# Patient Record
Sex: FEMALE | Race: WHITE | Employment: OTHER | URBAN - METROPOLITAN AREA
[De-identification: names, ages, dates, MRNs, and addresses within clinical notes are randomized per-mention and may not be internally consistent; named-entity substitution may affect disease eponyms.]

---

## 2017-02-18 ENCOUNTER — HOSPITAL ENCOUNTER (INPATIENT)
Age: 82
LOS: 4 days | Discharge: NURSING, REHAB OR LONG-TERM CARE | DRG: 480 | End: 2017-02-22
Attending: EMERGENCY MEDICINE | Admitting: INTERNAL MEDICINE
Payer: MEDICARE

## 2017-02-18 ENCOUNTER — ANESTHESIA EVENT (OUTPATIENT)
Dept: SURGERY | Age: 82
DRG: 480 | End: 2017-02-18
Payer: MEDICARE

## 2017-02-18 ENCOUNTER — APPOINTMENT (OUTPATIENT)
Dept: GENERAL RADIOLOGY | Age: 82
DRG: 480 | End: 2017-02-18
Attending: EMERGENCY MEDICINE
Payer: MEDICARE

## 2017-02-18 DIAGNOSIS — S72.001A CLOSED RIGHT HIP FRACTURE, INITIAL ENCOUNTER (HCC): Primary | ICD-10-CM

## 2017-02-18 DIAGNOSIS — F41.1 GAD (GENERALIZED ANXIETY DISORDER): ICD-10-CM

## 2017-02-18 PROBLEM — S72.009A HIP FRACTURE (HCC): Status: ACTIVE | Noted: 2017-02-18

## 2017-02-18 LAB
ALBUMIN SERPL BCP-MCNC: 3.4 G/DL (ref 3.2–4.6)
ALBUMIN/GLOB SERPL: 1.1 {RATIO} (ref 1.2–3.5)
ALP SERPL-CCNC: 83 U/L (ref 50–136)
ALT SERPL-CCNC: 16 U/L (ref 12–65)
ANION GAP BLD CALC-SCNC: 6 MMOL/L (ref 7–16)
APPEARANCE UR: ABNORMAL
AST SERPL W P-5'-P-CCNC: 23 U/L (ref 15–37)
BACTERIA URNS QL MICRO: ABNORMAL /HPF
BASOPHILS # BLD AUTO: 0 K/UL (ref 0–0.2)
BASOPHILS # BLD: 0 % (ref 0–2)
BILIRUB SERPL-MCNC: 0.7 MG/DL (ref 0.2–1.1)
BILIRUB UR QL: NEGATIVE
BUN SERPL-MCNC: 22 MG/DL (ref 8–23)
CALCIUM SERPL-MCNC: 9.5 MG/DL (ref 8.3–10.4)
CHLORIDE SERPL-SCNC: 109 MMOL/L (ref 98–107)
CO2 SERPL-SCNC: 28 MMOL/L (ref 21–32)
COLOR UR: YELLOW
CREAT SERPL-MCNC: 1.21 MG/DL (ref 0.6–1)
DIFFERENTIAL METHOD BLD: ABNORMAL
EOSINOPHIL # BLD: 0.1 K/UL (ref 0–0.8)
EOSINOPHIL NFR BLD: 1 % (ref 0.5–7.8)
ERYTHROCYTE [DISTWIDTH] IN BLOOD BY AUTOMATED COUNT: 13.8 % (ref 11.9–14.6)
GLOBULIN SER CALC-MCNC: 3.1 G/DL (ref 2.3–3.5)
GLUCOSE SERPL-MCNC: 102 MG/DL (ref 65–100)
GLUCOSE UR STRIP.AUTO-MCNC: NEGATIVE MG/DL
HCT VFR BLD AUTO: 40.8 % (ref 35.8–46.3)
HGB BLD-MCNC: 13.6 G/DL (ref 11.7–15.4)
HGB UR QL STRIP: NEGATIVE
IMM GRANULOCYTES # BLD: 0 K/UL (ref 0–0.5)
IMM GRANULOCYTES NFR BLD AUTO: 0.2 % (ref 0–5)
KETONES UR QL STRIP.AUTO: NEGATIVE MG/DL
LEUKOCYTE ESTERASE UR QL STRIP.AUTO: ABNORMAL
LYMPHOCYTES # BLD AUTO: 30 % (ref 13–44)
LYMPHOCYTES # BLD: 1.8 K/UL (ref 0.5–4.6)
MCH RBC QN AUTO: 32 PG (ref 26.1–32.9)
MCHC RBC AUTO-ENTMCNC: 33.3 G/DL (ref 31.4–35)
MCV RBC AUTO: 96 FL (ref 79.6–97.8)
MONOCYTES # BLD: 0.5 K/UL (ref 0.1–1.3)
MONOCYTES NFR BLD AUTO: 8 % (ref 4–12)
NEUTS SEG # BLD: 3.8 K/UL (ref 1.7–8.2)
NEUTS SEG NFR BLD AUTO: 61 % (ref 43–78)
NITRITE UR QL STRIP.AUTO: NEGATIVE
PH UR STRIP: 6 [PH] (ref 5–9)
PLATELET # BLD AUTO: 169 K/UL (ref 150–450)
PMV BLD AUTO: 10.5 FL (ref 10.8–14.1)
POTASSIUM SERPL-SCNC: 4.8 MMOL/L (ref 3.5–5.1)
PROT SERPL-MCNC: 6.5 G/DL (ref 6.3–8.2)
PROT UR STRIP-MCNC: NEGATIVE MG/DL
RBC # BLD AUTO: 4.25 M/UL (ref 4.05–5.25)
RBC #/AREA URNS HPF: ABNORMAL /HPF
SODIUM SERPL-SCNC: 143 MMOL/L (ref 136–145)
SP GR UR REFRACTOMETRY: 1.01 (ref 1–1.02)
UROBILINOGEN UR QL STRIP.AUTO: 0.2 EU/DL (ref 0.2–1)
WBC # BLD AUTO: 6.2 K/UL (ref 4.3–11.1)
WBC URNS QL MICRO: ABNORMAL /HPF

## 2017-02-18 PROCEDURE — 51702 INSERT TEMP BLADDER CATH: CPT | Performed by: EMERGENCY MEDICINE

## 2017-02-18 PROCEDURE — 86580 TB INTRADERMAL TEST: CPT | Performed by: INTERNAL MEDICINE

## 2017-02-18 PROCEDURE — 96374 THER/PROPH/DIAG INJ IV PUSH: CPT | Performed by: EMERGENCY MEDICINE

## 2017-02-18 PROCEDURE — 74011250636 HC RX REV CODE- 250/636: Performed by: INTERNAL MEDICINE

## 2017-02-18 PROCEDURE — 85025 COMPLETE CBC W/AUTO DIFF WBC: CPT | Performed by: EMERGENCY MEDICINE

## 2017-02-18 PROCEDURE — 87641 MR-STAPH DNA AMP PROBE: CPT | Performed by: INTERNAL MEDICINE

## 2017-02-18 PROCEDURE — 81003 URINALYSIS AUTO W/O SCOPE: CPT | Performed by: EMERGENCY MEDICINE

## 2017-02-18 PROCEDURE — 71010 XR CHEST SNGL V: CPT

## 2017-02-18 PROCEDURE — 65270000029 HC RM PRIVATE

## 2017-02-18 PROCEDURE — 80053 COMPREHEN METABOLIC PANEL: CPT | Performed by: EMERGENCY MEDICINE

## 2017-02-18 PROCEDURE — 73552 X-RAY EXAM OF FEMUR 2/>: CPT

## 2017-02-18 PROCEDURE — 77030005515 HC CATH URETH FOL14 BARD -B

## 2017-02-18 PROCEDURE — 86923 COMPATIBILITY TEST ELECTRIC: CPT | Performed by: EMERGENCY MEDICINE

## 2017-02-18 PROCEDURE — 74011250636 HC RX REV CODE- 250/636: Performed by: NURSE PRACTITIONER

## 2017-02-18 PROCEDURE — 96375 TX/PRO/DX INJ NEW DRUG ADDON: CPT | Performed by: EMERGENCY MEDICINE

## 2017-02-18 PROCEDURE — 99285 EMERGENCY DEPT VISIT HI MDM: CPT | Performed by: EMERGENCY MEDICINE

## 2017-02-18 PROCEDURE — 74011250637 HC RX REV CODE- 250/637: Performed by: INTERNAL MEDICINE

## 2017-02-18 PROCEDURE — 74011250636 HC RX REV CODE- 250/636: Performed by: EMERGENCY MEDICINE

## 2017-02-18 PROCEDURE — 77030021907 HC KT URIN FOL O&M -A

## 2017-02-18 PROCEDURE — 77030027138 HC INCENT SPIROMETER -A

## 2017-02-18 PROCEDURE — 93005 ELECTROCARDIOGRAM TRACING: CPT | Performed by: EMERGENCY MEDICINE

## 2017-02-18 PROCEDURE — 77030032490 HC SLV COMPR SCD KNE COVD -B

## 2017-02-18 PROCEDURE — 73502 X-RAY EXAM HIP UNI 2-3 VIEWS: CPT

## 2017-02-18 PROCEDURE — 74011000302 HC RX REV CODE- 302: Performed by: INTERNAL MEDICINE

## 2017-02-18 PROCEDURE — 77030036696 HC BOOT TRACT BUCKS S2SG -A

## 2017-02-18 PROCEDURE — 86900 BLOOD TYPING SEROLOGIC ABO: CPT | Performed by: EMERGENCY MEDICINE

## 2017-02-18 RX ORDER — TRAMADOL HYDROCHLORIDE 50 MG/1
50 TABLET ORAL
Status: DISCONTINUED | OUTPATIENT
Start: 2017-02-18 | End: 2017-02-22 | Stop reason: HOSPADM

## 2017-02-18 RX ORDER — GLUCOSAMINE SULFATE 1500 MG
1000 POWDER IN PACKET (EA) ORAL DAILY
COMMUNITY
End: 2017-02-22

## 2017-02-18 RX ORDER — ALBUTEROL SULFATE 90 UG/1
1 AEROSOL, METERED RESPIRATORY (INHALATION)
Status: DISCONTINUED | OUTPATIENT
Start: 2017-02-18 | End: 2017-02-22 | Stop reason: HOSPADM

## 2017-02-18 RX ORDER — ACETAMINOPHEN 325 MG/1
650 TABLET ORAL EVERY 8 HOURS
Status: DISCONTINUED | OUTPATIENT
Start: 2017-02-18 | End: 2017-02-19 | Stop reason: HOSPADM

## 2017-02-18 RX ORDER — ONDANSETRON 2 MG/ML
4 INJECTION INTRAMUSCULAR; INTRAVENOUS
Status: DISCONTINUED | OUTPATIENT
Start: 2017-02-18 | End: 2017-02-19 | Stop reason: HOSPADM

## 2017-02-18 RX ORDER — SODIUM CHLORIDE 9 MG/ML
75 INJECTION, SOLUTION INTRAVENOUS CONTINUOUS
Status: DISCONTINUED | OUTPATIENT
Start: 2017-02-18 | End: 2017-02-19 | Stop reason: HOSPADM

## 2017-02-18 RX ORDER — CARVEDILOL 3.12 MG/1
3.12 TABLET ORAL 2 TIMES DAILY WITH MEALS
Status: DISCONTINUED | OUTPATIENT
Start: 2017-02-19 | End: 2017-02-19

## 2017-02-18 RX ORDER — SODIUM CHLORIDE 0.9 % (FLUSH) 0.9 %
5-10 SYRINGE (ML) INJECTION AS NEEDED
Status: DISCONTINUED | OUTPATIENT
Start: 2017-02-18 | End: 2017-02-19 | Stop reason: HOSPADM

## 2017-02-18 RX ORDER — CEFAZOLIN SODIUM IN 0.9 % NACL 2 G/50 ML
2 INTRAVENOUS SOLUTION, PIGGYBACK (ML) INTRAVENOUS
Status: DISCONTINUED | OUTPATIENT
Start: 2017-02-18 | End: 2017-02-19

## 2017-02-18 RX ORDER — MORPHINE SULFATE 2 MG/ML
2 INJECTION, SOLUTION INTRAMUSCULAR; INTRAVENOUS
Status: COMPLETED | OUTPATIENT
Start: 2017-02-18 | End: 2017-02-18

## 2017-02-18 RX ORDER — OXYCODONE HYDROCHLORIDE 5 MG/1
5 TABLET ORAL
Status: DISCONTINUED | OUTPATIENT
Start: 2017-02-18 | End: 2017-02-22 | Stop reason: HOSPADM

## 2017-02-18 RX ORDER — SODIUM CHLORIDE, SODIUM LACTATE, POTASSIUM CHLORIDE, CALCIUM CHLORIDE 600; 310; 30; 20 MG/100ML; MG/100ML; MG/100ML; MG/100ML
75 INJECTION, SOLUTION INTRAVENOUS
Status: DISCONTINUED | OUTPATIENT
Start: 2017-02-18 | End: 2017-02-20

## 2017-02-18 RX ORDER — SODIUM CHLORIDE 0.9 % (FLUSH) 0.9 %
5-10 SYRINGE (ML) INJECTION EVERY 8 HOURS
Status: DISCONTINUED | OUTPATIENT
Start: 2017-02-18 | End: 2017-02-19 | Stop reason: HOSPADM

## 2017-02-18 RX ORDER — AMIODARONE HYDROCHLORIDE 200 MG/1
100 TABLET ORAL DAILY
Status: DISCONTINUED | OUTPATIENT
Start: 2017-02-19 | End: 2017-02-22 | Stop reason: HOSPADM

## 2017-02-18 RX ORDER — ATORVASTATIN CALCIUM 10 MG/1
10 TABLET, FILM COATED ORAL DAILY
Status: DISCONTINUED | OUTPATIENT
Start: 2017-02-19 | End: 2017-02-22 | Stop reason: HOSPADM

## 2017-02-18 RX ORDER — ONDANSETRON 2 MG/ML
4 INJECTION INTRAMUSCULAR; INTRAVENOUS
Status: COMPLETED | OUTPATIENT
Start: 2017-02-18 | End: 2017-02-18

## 2017-02-18 RX ORDER — HYDROMORPHONE HYDROCHLORIDE 1 MG/ML
0.5 INJECTION, SOLUTION INTRAMUSCULAR; INTRAVENOUS; SUBCUTANEOUS
Status: DISCONTINUED | OUTPATIENT
Start: 2017-02-18 | End: 2017-02-19

## 2017-02-18 RX ADMIN — TUBERCULIN PURIFIED PROTEIN DERIVATIVE 5 UNITS: 5 INJECTION INTRADERMAL at 23:39

## 2017-02-18 RX ADMIN — ACETAMINOPHEN 650 MG: 325 TABLET, FILM COATED ORAL at 23:39

## 2017-02-18 RX ADMIN — Medication 5 ML: at 23:13

## 2017-02-18 RX ADMIN — Medication 2 MG: at 20:33

## 2017-02-18 RX ADMIN — ONDANSETRON 4 MG: 2 INJECTION INTRAMUSCULAR; INTRAVENOUS at 20:33

## 2017-02-18 RX ADMIN — HYDROMORPHONE HYDROCHLORIDE 0.5 MG: 1 INJECTION, SOLUTION INTRAMUSCULAR; INTRAVENOUS; SUBCUTANEOUS at 23:24

## 2017-02-18 RX ADMIN — SODIUM CHLORIDE 75 ML/HR: 900 INJECTION, SOLUTION INTRAVENOUS at 23:09

## 2017-02-19 ENCOUNTER — ANESTHESIA (OUTPATIENT)
Dept: SURGERY | Age: 82
DRG: 480 | End: 2017-02-19
Payer: MEDICARE

## 2017-02-19 ENCOUNTER — SURGERY (OUTPATIENT)
Age: 82
End: 2017-02-19

## 2017-02-19 ENCOUNTER — APPOINTMENT (OUTPATIENT)
Dept: GENERAL RADIOLOGY | Age: 82
DRG: 480 | End: 2017-02-19
Attending: ORTHOPAEDIC SURGERY
Payer: MEDICARE

## 2017-02-19 PROBLEM — N30.00 ACUTE CYSTITIS WITHOUT HEMATURIA: Status: ACTIVE | Noted: 2017-02-19

## 2017-02-19 PROBLEM — S72.141A CLOSED INTERTROCHANTERIC FRACTURE OF RIGHT FEMUR (HCC): Status: ACTIVE | Noted: 2017-02-18

## 2017-02-19 LAB
APTT PPP: 22.9 SEC (ref 23.5–31.7)
ATRIAL RATE: 61 BPM
BACTERIA SPEC CULT: NORMAL
CALCIUM SERPL-MCNC: 9.6 MG/DL (ref 8.3–10.4)
CALCULATED P AXIS, ECG09: 87 DEGREES
CALCULATED R AXIS, ECG10: -44 DEGREES
CALCULATED T AXIS, ECG11: 82 DEGREES
DIAGNOSIS, 93000: NORMAL
DIASTOLIC BP, ECG02: NORMAL MMHG
INR PPP: 1 (ref 0.9–1.2)
P-R INTERVAL, ECG05: 168 MS
PREALB SERPL-MCNC: 18.8 MG/DL (ref 18–35.7)
PROTHROMBIN TIME: 11.1 SEC (ref 9.6–12)
PTH-INTACT SERPL-MCNC: 213.7 PG/ML (ref 14–72)
Q-T INTERVAL, ECG07: 416 MS
QRS DURATION, ECG06: 86 MS
QTC CALCULATION (BEZET), ECG08: 418 MS
SERVICE CMNT-IMP: NORMAL
SYSTOLIC BP, ECG01: NORMAL MMHG
VENTRICULAR RATE, ECG03: 61 BPM

## 2017-02-19 PROCEDURE — 82306 VITAMIN D 25 HYDROXY: CPT | Performed by: INTERNAL MEDICINE

## 2017-02-19 PROCEDURE — 76210000016 HC OR PH I REC 1 TO 1.5 HR: Performed by: ORTHOPAEDIC SURGERY

## 2017-02-19 PROCEDURE — 74011250637 HC RX REV CODE- 250/637: Performed by: ANESTHESIOLOGY

## 2017-02-19 PROCEDURE — 84134 ASSAY OF PREALBUMIN: CPT | Performed by: INTERNAL MEDICINE

## 2017-02-19 PROCEDURE — 74011000250 HC RX REV CODE- 250

## 2017-02-19 PROCEDURE — 76010000160 HC OR TIME 0.5 TO 1 HR INTENSV-TIER 1: Performed by: ORTHOPAEDIC SURGERY

## 2017-02-19 PROCEDURE — 74011250637 HC RX REV CODE- 250/637: Performed by: INTERNAL MEDICINE

## 2017-02-19 PROCEDURE — C1713 ANCHOR/SCREW BN/BN,TIS/BN: HCPCS | Performed by: ORTHOPAEDIC SURGERY

## 2017-02-19 PROCEDURE — 0QS634Z REPOSITION RIGHT UPPER FEMUR WITH INTERNAL FIXATION DEVICE, PERCUTANEOUS APPROACH: ICD-10-PCS | Performed by: ORTHOPAEDIC SURGERY

## 2017-02-19 PROCEDURE — 74011250637 HC RX REV CODE- 250/637: Performed by: NURSE PRACTITIONER

## 2017-02-19 PROCEDURE — 77030018836 HC SOL IRR NACL ICUM -A: Performed by: ORTHOPAEDIC SURGERY

## 2017-02-19 PROCEDURE — 74011250636 HC RX REV CODE- 250/636: Performed by: NURSE PRACTITIONER

## 2017-02-19 PROCEDURE — 74011250636 HC RX REV CODE- 250/636

## 2017-02-19 PROCEDURE — 74011250636 HC RX REV CODE- 250/636: Performed by: ANESTHESIOLOGY

## 2017-02-19 PROCEDURE — 77030008467 HC STPLR SKN COVD -B: Performed by: ORTHOPAEDIC SURGERY

## 2017-02-19 PROCEDURE — C1769 GUIDE WIRE: HCPCS | Performed by: ORTHOPAEDIC SURGERY

## 2017-02-19 PROCEDURE — 77030020143 HC AIRWY LARYN INTUB CGAS -A: Performed by: ANESTHESIOLOGY

## 2017-02-19 PROCEDURE — 74011000258 HC RX REV CODE- 258: Performed by: INTERNAL MEDICINE

## 2017-02-19 PROCEDURE — 85730 THROMBOPLASTIN TIME PARTIAL: CPT | Performed by: INTERNAL MEDICINE

## 2017-02-19 PROCEDURE — 83970 ASSAY OF PARATHORMONE: CPT | Performed by: INTERNAL MEDICINE

## 2017-02-19 PROCEDURE — 76060000033 HC ANESTHESIA 1 TO 1.5 HR: Performed by: ORTHOPAEDIC SURGERY

## 2017-02-19 PROCEDURE — 77030020782 HC GWN BAIR PAWS FLX 3M -B: Performed by: ANESTHESIOLOGY

## 2017-02-19 PROCEDURE — 77030014405 HC GD ROD RMR SYNT -C: Performed by: ORTHOPAEDIC SURGERY

## 2017-02-19 PROCEDURE — 73552 X-RAY EXAM OF FEMUR 2/>: CPT

## 2017-02-19 PROCEDURE — 77030011640 HC PAD GRND REM COVD -A: Performed by: ORTHOPAEDIC SURGERY

## 2017-02-19 PROCEDURE — 74011250636 HC RX REV CODE- 250/636: Performed by: INTERNAL MEDICINE

## 2017-02-19 PROCEDURE — 65270000029 HC RM PRIVATE

## 2017-02-19 PROCEDURE — 77030035168: Performed by: ORTHOPAEDIC SURGERY

## 2017-02-19 PROCEDURE — 36415 COLL VENOUS BLD VENIPUNCTURE: CPT | Performed by: INTERNAL MEDICINE

## 2017-02-19 PROCEDURE — 77030002933 HC SUT MCRYL J&J -A: Performed by: ORTHOPAEDIC SURGERY

## 2017-02-19 PROCEDURE — 85610 PROTHROMBIN TIME: CPT | Performed by: INTERNAL MEDICINE

## 2017-02-19 DEVICE — IMPLANTABLE DEVICE: Type: IMPLANTABLE DEVICE | Site: FEMUR | Status: FUNCTIONAL

## 2017-02-19 RX ORDER — OXYCODONE HYDROCHLORIDE 5 MG/1
5 TABLET ORAL
Status: DISCONTINUED | OUTPATIENT
Start: 2017-02-19 | End: 2017-02-22 | Stop reason: HOSPADM

## 2017-02-19 RX ORDER — SODIUM CHLORIDE, SODIUM LACTATE, POTASSIUM CHLORIDE, CALCIUM CHLORIDE 600; 310; 30; 20 MG/100ML; MG/100ML; MG/100ML; MG/100ML
75 INJECTION, SOLUTION INTRAVENOUS CONTINUOUS
Status: DISCONTINUED | OUTPATIENT
Start: 2017-02-19 | End: 2017-02-20

## 2017-02-19 RX ORDER — OXYCODONE HYDROCHLORIDE 5 MG/1
10 TABLET ORAL
Status: DISCONTINUED | OUTPATIENT
Start: 2017-02-19 | End: 2017-02-19 | Stop reason: HOSPADM

## 2017-02-19 RX ORDER — SODIUM CHLORIDE 0.9 % (FLUSH) 0.9 %
5-10 SYRINGE (ML) INJECTION EVERY 8 HOURS
Status: DISCONTINUED | OUTPATIENT
Start: 2017-02-19 | End: 2017-02-22 | Stop reason: HOSPADM

## 2017-02-19 RX ORDER — SODIUM CHLORIDE 0.9 % (FLUSH) 0.9 %
5-10 SYRINGE (ML) INJECTION EVERY 8 HOURS
Status: DISCONTINUED | OUTPATIENT
Start: 2017-02-19 | End: 2017-02-19 | Stop reason: HOSPADM

## 2017-02-19 RX ORDER — DIPHENHYDRAMINE HYDROCHLORIDE 50 MG/ML
12.5 INJECTION, SOLUTION INTRAMUSCULAR; INTRAVENOUS ONCE
Status: DISCONTINUED | OUTPATIENT
Start: 2017-02-19 | End: 2017-02-19 | Stop reason: HOSPADM

## 2017-02-19 RX ORDER — SODIUM CHLORIDE 0.9 % (FLUSH) 0.9 %
5-10 SYRINGE (ML) INJECTION AS NEEDED
Status: DISCONTINUED | OUTPATIENT
Start: 2017-02-19 | End: 2017-02-19 | Stop reason: HOSPADM

## 2017-02-19 RX ORDER — CEFAZOLIN SODIUM IN 0.9 % NACL 2 G/50 ML
INTRAVENOUS SOLUTION, PIGGYBACK (ML) INTRAVENOUS AS NEEDED
Status: DISCONTINUED | OUTPATIENT
Start: 2017-02-19 | End: 2017-02-19 | Stop reason: HOSPADM

## 2017-02-19 RX ORDER — LIDOCAINE HYDROCHLORIDE 20 MG/ML
INJECTION, SOLUTION EPIDURAL; INFILTRATION; INTRACAUDAL; PERINEURAL AS NEEDED
Status: DISCONTINUED | OUTPATIENT
Start: 2017-02-19 | End: 2017-02-19 | Stop reason: HOSPADM

## 2017-02-19 RX ORDER — FAMOTIDINE 20 MG/1
20 TABLET, FILM COATED ORAL ONCE
Status: COMPLETED | OUTPATIENT
Start: 2017-02-19 | End: 2017-02-19

## 2017-02-19 RX ORDER — ENOXAPARIN SODIUM 100 MG/ML
30 INJECTION SUBCUTANEOUS EVERY 24 HOURS
Status: DISCONTINUED | OUTPATIENT
Start: 2017-02-20 | End: 2017-02-22 | Stop reason: HOSPADM

## 2017-02-19 RX ORDER — ACETAMINOPHEN 500 MG
500 TABLET ORAL ONCE
Status: DISCONTINUED | OUTPATIENT
Start: 2017-02-19 | End: 2017-02-19 | Stop reason: HOSPADM

## 2017-02-19 RX ORDER — SODIUM CHLORIDE, SODIUM LACTATE, POTASSIUM CHLORIDE, CALCIUM CHLORIDE 600; 310; 30; 20 MG/100ML; MG/100ML; MG/100ML; MG/100ML
75 INJECTION, SOLUTION INTRAVENOUS CONTINUOUS
Status: DISCONTINUED | OUTPATIENT
Start: 2017-02-19 | End: 2017-02-19 | Stop reason: HOSPADM

## 2017-02-19 RX ORDER — ACETAMINOPHEN 325 MG/1
650 TABLET ORAL EVERY 8 HOURS
Status: DISCONTINUED | OUTPATIENT
Start: 2017-02-19 | End: 2017-02-22 | Stop reason: HOSPADM

## 2017-02-19 RX ORDER — ETOMIDATE 2 MG/ML
INJECTION INTRAVENOUS AS NEEDED
Status: DISCONTINUED | OUTPATIENT
Start: 2017-02-19 | End: 2017-02-19 | Stop reason: HOSPADM

## 2017-02-19 RX ORDER — PROPOFOL 10 MG/ML
INJECTION, EMULSION INTRAVENOUS AS NEEDED
Status: DISCONTINUED | OUTPATIENT
Start: 2017-02-19 | End: 2017-02-19 | Stop reason: HOSPADM

## 2017-02-19 RX ORDER — ACETAMINOPHEN 10 MG/ML
1000 INJECTION, SOLUTION INTRAVENOUS
Status: COMPLETED | OUTPATIENT
Start: 2017-02-19 | End: 2017-02-19

## 2017-02-19 RX ORDER — HYDROMORPHONE HYDROCHLORIDE 2 MG/ML
0.5 INJECTION, SOLUTION INTRAMUSCULAR; INTRAVENOUS; SUBCUTANEOUS
Status: DISCONTINUED | OUTPATIENT
Start: 2017-02-19 | End: 2017-02-19 | Stop reason: HOSPADM

## 2017-02-19 RX ORDER — LIDOCAINE HYDROCHLORIDE 10 MG/ML
0.1 INJECTION INFILTRATION; PERINEURAL AS NEEDED
Status: DISCONTINUED | OUTPATIENT
Start: 2017-02-19 | End: 2017-02-19 | Stop reason: HOSPADM

## 2017-02-19 RX ORDER — SODIUM CHLORIDE 0.9 % (FLUSH) 0.9 %
5-10 SYRINGE (ML) INJECTION AS NEEDED
Status: DISCONTINUED | OUTPATIENT
Start: 2017-02-19 | End: 2017-02-22 | Stop reason: HOSPADM

## 2017-02-19 RX ORDER — ONDANSETRON 2 MG/ML
4 INJECTION INTRAMUSCULAR; INTRAVENOUS
Status: DISCONTINUED | OUTPATIENT
Start: 2017-02-19 | End: 2017-02-22 | Stop reason: HOSPADM

## 2017-02-19 RX ORDER — MIDAZOLAM HYDROCHLORIDE 1 MG/ML
2 INJECTION, SOLUTION INTRAMUSCULAR; INTRAVENOUS
Status: DISCONTINUED | OUTPATIENT
Start: 2017-02-19 | End: 2017-02-19 | Stop reason: HOSPADM

## 2017-02-19 RX ORDER — OXYCODONE HYDROCHLORIDE 5 MG/1
5 TABLET ORAL
Status: DISCONTINUED | OUTPATIENT
Start: 2017-02-19 | End: 2017-02-19 | Stop reason: HOSPADM

## 2017-02-19 RX ORDER — SODIUM CHLORIDE, SODIUM LACTATE, POTASSIUM CHLORIDE, CALCIUM CHLORIDE 600; 310; 30; 20 MG/100ML; MG/100ML; MG/100ML; MG/100ML
1000 INJECTION, SOLUTION INTRAVENOUS CONTINUOUS
Status: DISCONTINUED | OUTPATIENT
Start: 2017-02-19 | End: 2017-02-19

## 2017-02-19 RX ORDER — MAG HYDROX/ALUMINUM HYD/SIMETH 200-200-20
30 SUSPENSION, ORAL (FINAL DOSE FORM) ORAL
Status: DISCONTINUED | OUTPATIENT
Start: 2017-02-19 | End: 2017-02-22 | Stop reason: HOSPADM

## 2017-02-19 RX ORDER — NALOXONE HYDROCHLORIDE 0.4 MG/ML
0.1 INJECTION, SOLUTION INTRAMUSCULAR; INTRAVENOUS; SUBCUTANEOUS AS NEEDED
Status: DISCONTINUED | OUTPATIENT
Start: 2017-02-19 | End: 2017-02-19 | Stop reason: HOSPADM

## 2017-02-19 RX ORDER — FAMOTIDINE 10 MG/ML
20 INJECTION INTRAVENOUS ONCE
Status: DISCONTINUED | OUTPATIENT
Start: 2017-02-19 | End: 2017-02-19 | Stop reason: HOSPADM

## 2017-02-19 RX ORDER — DOCUSATE SODIUM 100 MG/1
100 CAPSULE, LIQUID FILLED ORAL 2 TIMES DAILY
Status: DISCONTINUED | OUTPATIENT
Start: 2017-02-19 | End: 2017-02-22 | Stop reason: HOSPADM

## 2017-02-19 RX ORDER — ONDANSETRON 2 MG/ML
4 INJECTION INTRAMUSCULAR; INTRAVENOUS ONCE
Status: DISCONTINUED | OUTPATIENT
Start: 2017-02-19 | End: 2017-02-19 | Stop reason: HOSPADM

## 2017-02-19 RX ORDER — FENTANYL CITRATE 50 UG/ML
100 INJECTION, SOLUTION INTRAMUSCULAR; INTRAVENOUS AS NEEDED
Status: DISCONTINUED | OUTPATIENT
Start: 2017-02-19 | End: 2017-02-19 | Stop reason: HOSPADM

## 2017-02-19 RX ORDER — FENTANYL CITRATE 50 UG/ML
INJECTION, SOLUTION INTRAMUSCULAR; INTRAVENOUS AS NEEDED
Status: DISCONTINUED | OUTPATIENT
Start: 2017-02-19 | End: 2017-02-19 | Stop reason: HOSPADM

## 2017-02-19 RX ORDER — FERROUS SULFATE, DRIED 160(50) MG
1 TABLET, EXTENDED RELEASE ORAL
Status: DISCONTINUED | OUTPATIENT
Start: 2017-02-19 | End: 2017-02-22 | Stop reason: HOSPADM

## 2017-02-19 RX ADMIN — ETOMIDATE 10 MG: 2 INJECTION INTRAVENOUS at 09:55

## 2017-02-19 RX ADMIN — HYDROMORPHONE HYDROCHLORIDE 0.25 MG: 2 INJECTION, SOLUTION INTRAMUSCULAR; INTRAVENOUS; SUBCUTANEOUS at 11:05

## 2017-02-19 RX ADMIN — ONDANSETRON 4 MG: 2 INJECTION INTRAMUSCULAR; INTRAVENOUS at 13:25

## 2017-02-19 RX ADMIN — ACETAMINOPHEN 1000 MG: 10 INJECTION, SOLUTION INTRAVENOUS at 10:45

## 2017-02-19 RX ADMIN — ACETAMINOPHEN 650 MG: 325 TABLET, FILM COATED ORAL at 15:17

## 2017-02-19 RX ADMIN — FENTANYL CITRATE 50 MCG: 50 INJECTION, SOLUTION INTRAMUSCULAR; INTRAVENOUS at 10:00

## 2017-02-19 RX ADMIN — HYDROMORPHONE HYDROCHLORIDE 0.25 MG: 2 INJECTION, SOLUTION INTRAMUSCULAR; INTRAVENOUS; SUBCUTANEOUS at 10:50

## 2017-02-19 RX ADMIN — OXYCODONE HYDROCHLORIDE 5 MG: 5 TABLET ORAL at 03:00

## 2017-02-19 RX ADMIN — Medication 10 ML: at 14:00

## 2017-02-19 RX ADMIN — FAMOTIDINE 20 MG: 20 TABLET ORAL at 07:51

## 2017-02-19 RX ADMIN — DOCUSATE SODIUM 100 MG: 100 CAPSULE, LIQUID FILLED ORAL at 16:53

## 2017-02-19 RX ADMIN — ACETAMINOPHEN 650 MG: 325 TABLET, FILM COATED ORAL at 21:14

## 2017-02-19 RX ADMIN — SODIUM CHLORIDE 500 ML: 900 INJECTION, SOLUTION INTRAVENOUS at 13:40

## 2017-02-19 RX ADMIN — HYDROMORPHONE HYDROCHLORIDE 0.5 MG: 1 INJECTION, SOLUTION INTRAMUSCULAR; INTRAVENOUS; SUBCUTANEOUS at 05:01

## 2017-02-19 RX ADMIN — SODIUM CHLORIDE, SODIUM LACTATE, POTASSIUM CHLORIDE, AND CALCIUM CHLORIDE 75 ML/HR: 600; 310; 30; 20 INJECTION, SOLUTION INTRAVENOUS at 15:21

## 2017-02-19 RX ADMIN — SODIUM CHLORIDE, SODIUM LACTATE, POTASSIUM CHLORIDE, AND CALCIUM CHLORIDE 75 ML/HR: 600; 310; 30; 20 INJECTION, SOLUTION INTRAVENOUS at 07:30

## 2017-02-19 RX ADMIN — CARVEDILOL 3.12 MG: 3.12 TABLET, FILM COATED ORAL at 06:27

## 2017-02-19 RX ADMIN — Medication 2 G: at 10:04

## 2017-02-19 RX ADMIN — FENTANYL CITRATE 25 MCG: 50 INJECTION, SOLUTION INTRAMUSCULAR; INTRAVENOUS at 10:15

## 2017-02-19 RX ADMIN — HYDROMORPHONE HYDROCHLORIDE 0.25 MG: 2 INJECTION, SOLUTION INTRAMUSCULAR; INTRAVENOUS; SUBCUTANEOUS at 10:55

## 2017-02-19 RX ADMIN — HYDROMORPHONE HYDROCHLORIDE 0.25 MG: 2 INJECTION, SOLUTION INTRAMUSCULAR; INTRAVENOUS; SUBCUTANEOUS at 11:00

## 2017-02-19 RX ADMIN — CALCIUM CARBONATE 500 MG (1,250 MG)-VITAMIN D3 200 UNIT TABLET 1 TABLET: at 16:53

## 2017-02-19 RX ADMIN — PROPOFOL 30 MG: 10 INJECTION, EMULSION INTRAVENOUS at 09:45

## 2017-02-19 RX ADMIN — FENTANYL CITRATE 25 MCG: 50 INJECTION, SOLUTION INTRAMUSCULAR; INTRAVENOUS at 10:30

## 2017-02-19 RX ADMIN — CEFTRIAXONE 1 G: 1 INJECTION, POWDER, FOR SOLUTION INTRAMUSCULAR; INTRAVENOUS at 14:39

## 2017-02-19 RX ADMIN — ONDANSETRON 4 MG: 2 INJECTION INTRAMUSCULAR; INTRAVENOUS at 17:44

## 2017-02-19 RX ADMIN — ACETAMINOPHEN 650 MG: 325 TABLET, FILM COATED ORAL at 05:01

## 2017-02-19 RX ADMIN — Medication 10 ML: at 21:14

## 2017-02-19 RX ADMIN — AMIODARONE HYDROCHLORIDE 100 MG: 200 TABLET ORAL at 08:00

## 2017-02-19 RX ADMIN — LIDOCAINE HYDROCHLORIDE 20 MG: 20 INJECTION, SOLUTION EPIDURAL; INFILTRATION; INTRACAUDAL; PERINEURAL at 09:45
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
## 2017-02-19 NOTE — PROGRESS NOTES
Some hypotension post-op. Otherwise stable. Not tachycardic. Some lightheadedness. Will give small IVF bolus. History of CHF so need to be cautious with IVF. Monitor. Hold evening coreg.

## 2017-02-19 NOTE — ANESTHESIA PROCEDURE NOTES
Arterial Line Placement    Start time: 2/19/2017 7:42 AM  End time: 2/19/2017 7:46 AM  Performed by: Darline Knowles by: Rosa Villalba     Pre-Procedure  Indications:  Arterial pressure monitoring and blood sampling  Preanesthetic Checklist: patient identified, risks and benefits discussed, anesthesia consent, site marked, patient being monitored, timeout performed and patient being monitored    Timeout Time: 07:42        Procedure:   Prep:  Chlorhexidine  Seldinger Technique?: Yes    Orientation:  Left  Location:  Radial artery  Catheter size:  20 G  Number of attempts:  1    Assessment:   Post-procedure:  Line secured and sterile dressing applied  Patient Tolerance:  Patient tolerated the procedure well with no immediate complications

## 2017-02-19 NOTE — PROGRESS NOTES
Hospitalist Progress Note     Admit Date:  2017  7:26 PM   Name:  Tariq Irwin   Age:  80 y.o.  :  1928   MRN:  [id number]   PCP:  Danilo Brown MD  Treatment Team: Attending Provider: Maximo Kaur DO; Utilization Review: Raúl Catherine RN    Subjective:   Patient is an 81 y/o F who was admitted with hip fracture and UTI.  - operative repair today. No current complaints. Seen in pre-op holding. No CP, SOB. Objective:   Patient Vitals for the past 24 hrs:   Temp Pulse Resp BP SpO2   17 97.7 °F (36.5 °C) 60 16 119/60 93 %   17 0615 - 61 14 103/51 93 %   17 97.4 °F (36.3 °C) 63 17 142/71 96 %   17 2319 97.3 °F (36.3 °C) 68 15 143/73 99 %   170 - 63 - 173/75 95 %   171 - 63 - - 99 %   17 - - - 152/81 -   175 - - - - 100 %   17 - 62 18 166/79 99 %   179 97.4 °F (36.3 °C) 62 18 177/82 100 %     Oxygen Therapy  O2 Sat (%): 93 % (17)  Pulse via Oximetry: 63 beats per minute (17)  O2 Device: Nasal cannula (17)  O2 Flow Rate (L/min): 2 l/min (17)    Intake/Output Summary (Last 24 hours) at 17 0844  Last data filed at 17 0502   Gross per 24 hour   Intake                0 ml   Output              450 ml   Net             -450 ml       General:    Well nourished. Alert. CV:   RRR. No murmur, rub, or gallop. Lungs:   Clear to auscultation bilaterally. No wheezing, rhonchi, or rales. Abdomen:   Soft, nontender, nondistended. Bowel sounds normal.   Extremities: Warm and dry. No cyanosis or edema. Skin:     No rashes or jaundice.      Current Meds:  Current Facility-Administered Medications   Medication Dose Route Frequency    lidocaine (XYLOCAINE) 10 mg/mL (1 %) injection 0.1 mL  0.1 mL SubCUTAneous PRN    lactated ringers bolus infusion   IntraVENous ONCE    sodium chloride (NS) flush 5-10 mL  5-10 mL IntraVENous Q8H  sodium chloride (NS) flush 5-10 mL  5-10 mL IntraVENous PRN    famotidine (PF) (PEPCID) injection 20 mg  20 mg IntraVENous ONCE    fentaNYL citrate (PF) injection 100 mcg  100 mcg IntraVENous PRN    midazolam (VERSED) injection 2 mg  2 mg IntraVENous ONCE PRN    cefTRIAXone (ROCEPHIN) 1 g in 0.9% sodium chloride (MBP/ADV) 50 mL  1 g IntraVENous Q24H    ceFAZolin in 0.9% NS (ANCEF) IVPB soln 2 g  2 g IntraVENous ON CALL TO OR    lactated ringers infusion  75 mL/hr IntraVENous ON CALL TO OR    traMADol (ULTRAM) tablet 50 mg  50 mg Oral Q6H PRN    HYDROmorphone (PF) (DILAUDID) injection 0.5 mg  0.5 mg IntraVENous Q4H PRN    oxyCODONE IR (ROXICODONE) tablet 5 mg  5 mg Oral Q4H PRN    albuterol (PROVENTIL HFA, VENTOLIN HFA, PROAIR HFA) inhaler 1 Puff  1 Puff Inhalation Q4H PRN    amiodarone (CORDARONE) tablet 100 mg  100 mg Oral DAILY    atorvastatin (LIPITOR) tablet 10 mg  10 mg Oral DAILY    carvedilol (COREG) tablet 3.125 mg  3.125 mg Oral BID WITH MEALS    0.9% sodium chloride infusion  75 mL/hr IntraVENous CONTINUOUS    sodium chloride (NS) flush 5-10 mL  5-10 mL IntraVENous Q8H    sodium chloride (NS) flush 5-10 mL  5-10 mL IntraVENous PRN    acetaminophen (TYLENOL) tablet 650 mg  650 mg Oral Q8H    ondansetron (ZOFRAN) injection 4 mg  4 mg IntraVENous Q6H PRN       Labs and Studies:  I have reviewed all labs, meds, telemetry events, and studies from the last 24 hours.   Recent Results (from the past 24 hour(s))   CBC WITH AUTOMATED DIFF    Collection Time: 02/18/17  7:40 PM   Result Value Ref Range    WBC 6.2 4.3 - 11.1 K/uL    RBC 4.25 4.05 - 5.25 M/uL    HGB 13.6 11.7 - 15.4 g/dL    HCT 40.8 35.8 - 46.3 %    MCV 96.0 79.6 - 97.8 FL    MCH 32.0 26.1 - 32.9 PG    MCHC 33.3 31.4 - 35.0 g/dL    RDW 13.8 11.9 - 14.6 %    PLATELET 737 181 - 206 K/uL    MPV 10.5 (L) 10.8 - 14.1 FL    DF AUTOMATED      NEUTROPHILS 61 43 - 78 %    LYMPHOCYTES 30 13 - 44 %    MONOCYTES 8 4.0 - 12.0 %    EOSINOPHILS 1 0.5 - 7.8 %    BASOPHILS 0 0.0 - 2.0 %    IMMATURE GRANULOCYTES 0.2 0.0 - 5.0 %    ABS. NEUTROPHILS 3.8 1.7 - 8.2 K/UL    ABS. LYMPHOCYTES 1.8 0.5 - 4.6 K/UL    ABS. MONOCYTES 0.5 0.1 - 1.3 K/UL    ABS. EOSINOPHILS 0.1 0.0 - 0.8 K/UL    ABS. BASOPHILS 0.0 0.0 - 0.2 K/UL    ABS. IMM. GRANS. 0.0 0.0 - 0.5 K/UL   METABOLIC PANEL, COMPREHENSIVE    Collection Time: 02/18/17  7:40 PM   Result Value Ref Range    Sodium 143 136 - 145 mmol/L    Potassium 4.8 3.5 - 5.1 mmol/L    Chloride 109 (H) 98 - 107 mmol/L    CO2 28 21 - 32 mmol/L    Anion gap 6 (L) 7 - 16 mmol/L    Glucose 102 (H) 65 - 100 mg/dL    BUN 22 8 - 23 MG/DL    Creatinine 1.21 (H) 0.6 - 1.0 MG/DL    GFR est AA 54 (L) >60 ml/min/1.73m2    GFR est non-AA 45 (L) >60 ml/min/1.73m2    Calcium 9.5 8.3 - 10.4 MG/DL    Bilirubin, total 0.7 0.2 - 1.1 MG/DL    ALT (SGPT) 16 12 - 65 U/L    AST (SGOT) 23 15 - 37 U/L    Alk. phosphatase 83 50 - 136 U/L    Protein, total 6.5 6.3 - 8.2 g/dL    Albumin 3.4 3.2 - 4.6 g/dL    Globulin 3.1 2.3 - 3.5 g/dL    A-G Ratio 1.1 (L) 1.2 - 3.5     TYPE & SCREEN    Collection Time: 02/18/17  7:40 PM   Result Value Ref Range    Crossmatch Expiration 02/21/2017     ABO/Rh(D) A POSITIVE     Antibody screen NEG    URINALYSIS W/ RFLX MICROSCOPIC    Collection Time: 02/18/17  8:40 PM   Result Value Ref Range    Color YELLOW      Appearance CLOUDY      Specific gravity 1.011 1.001 - 1.023      pH (UA) 6.0 5.0 - 9.0      Protein NEGATIVE  NEG mg/dL    Glucose NEGATIVE  NEG mg/dL    Ketone NEGATIVE  NEG mg/dL    Bilirubin NEGATIVE  NEG      Blood NEGATIVE  NEG      Urobilinogen 0.2 0.2 - 1.0 EU/dL    Nitrites NEGATIVE  NEG      Leukocyte Esterase TRACE (A) NEG      WBC 5-10 0 /hpf    RBC 0-3 0 /hpf    Bacteria 4+ (H) 0 /hpf   MSSA/MRSA SC BY PCR, NASAL SWAB    Collection Time: 02/18/17 11:10 PM   Result Value Ref Range    Special Requests: NO SPECIAL REQUESTS      Culture result:        SA target not detected.                                  A MRSA NEGATIVE, SA NEGATIVE test result does not preclude MRSA or SA nasal colonization. PREALBUMIN    Collection Time: 02/19/17 12:17 AM   Result Value Ref Range    Prealbumin 18.8 18.0 - 35.7 MG/DL   PROTHROMBIN TIME + INR    Collection Time: 02/19/17 12:17 AM   Result Value Ref Range    Prothrombin time 11.1 9.6 - 12.0 sec    INR 1.0 0.9 - 1.2     PTT    Collection Time: 02/19/17 12:17 AM   Result Value Ref Range    aPTT 22.9 (L) 23.5 - 31.7 SEC   PTH INTACT    Collection Time: 02/19/17 12:17 AM   Result Value Ref Range    Calcium 9.6 8.3 - 10.4 MG/DL    PTH, Intact 213.7 (H) 14.0 - 72.0 pg/mL        All Micro Results     Procedure Component Value Units Date/Time    MSSA/MRSA SC BY PCR, NASAL SWAB [[id number]] Collected:  02/18/17 2310    Order Status:  Completed Specimen:  Swab Updated:  02/19/17 0715     Special Requests: NO SPECIAL REQUESTS        Culture result:         SA target not detected. A MRSA NEGATIVE, SA NEGATIVE test result does not preclude MRSA or SA nasal colonization. Recent Imaging:  CXR Results  (Last 48 hours)               02/18/17 2013  XR CHEST SNGL V Final result    Impression:  IMPRESSION: Comminuted right intertrochanteric hip fracture. Right femur: AP and lateral views of the right femur were obtained. No prior   studies are available for comparison. Finding: No additional acute fracture or dislocation is present. There is   diffuse osteopenia. The soft tissues are unremarkable. IMPRESSION: No additional acute fracture identified. Portable chest:       Comparison: 10/18/2015       Findings: A single view of the chest was obtained at 1956 hours. A dual-chamber   cardiac pacer device is present. The patient is slightly rotated towards the   right. The cardiac and mediastinal silhouette are normal in size and configuration. There is mild right basilar scarring. There are no pleural effusions.  The   pulmonary vascularity is within normal limits. Deformity to the left humeral   neck suggest remote traumatic injury. Impression: No acute findings by plain film evaluation. Narrative:  Right hip, right femur, portable chest       HISTORY: Right hip pain after fall. Right hip: 3 views of the right were obtained. Comparison was made to the prior   exam dated 04/26/2014. Susan Tate FINDINGS: There is a comminuted intertrochanteric hip fracture on the right. There is nearly 90 degrees of varus angulation of the dominant fracture   fragments. No additional acute fracture or dislocation is present. There is   diffuse osteopenia. CT Results  (Last 48 hours)    None          Assessment and Plan:     Hospital Problems as of 2/19/2017  Date Reviewed: 1/10/2017          Codes Class Noted - Resolved POA    Acute cystitis without hematuria ICD-10-CM: N30.00  ICD-9-CM: 595.0  2/19/2017 - Present Yes        * (Principal)Closed intertrochanteric fracture of right femur ([address] ICD-10-CM: W88.741W  ICD-9-CM: 820.21  2/18/2017 - Present Yes        Chronic systolic congestive heart failure (HCC) (Chronic) ICD-10-CM: I50.22  ICD-9-CM: 428.22, 428.0  5/24/2016 - Present Yes        CAD (coronary artery disease) (Chronic) ICD-10-CM: I25.10  ICD-9-CM: 414.00  Unknown - Present Yes    Overview Signed 4/23/2014  4:10 PM by Jody Fernandez     MI, 2 STENT ,ARRHYTHMIA             S/P implantation of automatic cardioverter/defibrillator (AICD) (Chronic) ICD-10-CM: Z95.810  ICD-9-CM: V45.02  4/29/2012 - Present Yes                PLAN:    · Rocephin for UTI. No culture was sent in ER  · Surgery today for hip fracture  · Other listed conditions stable, cont current    DC planning:  STR in a few days. Ppd done. SW consulted.   DVT ppx:  lovenox  Discussed plan with:  Pt and family    Signed:  Yareli Coe MD

## 2017-02-19 NOTE — PROGRESS NOTES
TRANSFER - OUT REPORT:    Verbal report given to Cali PALENCIA(name) on Eugena Pipes  being transferred to pre-op(unit) for ordered procedure       Report consisted of patients Situation, Background, Assessment and   Recommendations(SBAR). Information from the following report(s) SBAR, Kardex, Intake/Output, MAR and Recent Results was reviewed with the receiving nurse. Lines:   Peripheral IV 02/18/17 Left Antecubital (Active)   Site Assessment Clean, dry, & intact 2/18/2017 11:13 PM   Phlebitis Assessment 0 2/18/2017 11:13 PM   Infiltration Assessment 0 2/18/2017 11:13 PM   Dressing Status Clean, dry, & intact 2/18/2017 11:13 PM   Dressing Type Tape;Transparent 2/18/2017 11:13 PM   Hub Color/Line Status Capped 2/18/2017 11:13 PM        Opportunity for questions and clarification was provided.
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
## 2017-02-19 NOTE — PROGRESS NOTES
Primary Nurse Jolynn Monroe RN and Marti Jose RN performed a dual skin assessment on this patient. Scar to the left knee, legs marcos bilaterally, scattered bruising, healing skin tear to left hand, and small abrasions to the right foot. No other impairments present. Welcome packet, medication side effects sheet, and fracture booklet given to and reviewed with patient and family.

## 2017-02-19 NOTE — PROGRESS NOTES
Dual skin assessment with Parish Thomas RN. Dressing with moderate sanguinous breakthrough drainage, dry, intact. No other changes to previous skin assessment note.

## 2017-02-19 NOTE — ANESTHESIA POSTPROCEDURE EVALUATION
Post-Anesthesia Evaluation and Assessment    Patient: Margy Jennings MRN: [id number]  SSN: [id number]    YOB: 1928  Age: 80 y.o. Sex: female       Cardiovascular Function/Vital Signs  Visit Vitals    /71    Pulse 65    Temp 36.3 °C (97.4 °F)    Resp 12    Ht 5' 2\" (1.575 m)    Wt 45.4 kg (100 lb)    SpO2 100%    BMI 18.29 kg/m2       Patient is status post general anesthesia for Procedure(s): FEMUR INSERTION INTRA MEDULLARY NAIL. Nausea/Vomiting: None    Postoperative hydration reviewed and adequate. Pain:  Pain Scale 1: Numeric (0 - 10) (02/19/17 1105)  Pain Intensity 1: 7 (02/19/17 1105)   Managed    Neurological Status:   Neuro (WDL): Exceptions to WDL (02/19/17 1044)  Neuro  Neurologic State: Drowsy (02/19/17 1044)  Orientation Level: Oriented X4 (02/19/17 0707)   At baseline    Mental Status and Level of Consciousness: Arousable    Pulmonary Status:   O2 Device: Nasal cannula (02/19/17 1044)   Adequate oxygenation and airway patent    Complications related to anesthesia: None    Post-anesthesia assessment completed.  No concerns    Signed By: Quyen Johnson MD     February 19, 2017

## 2017-02-19 NOTE — PERIOP NOTES
TRANSFER - IN REPORT:    Verbal report received from [facility], [facility] on Ophelia Padron  being received from 7th floor  for ordered procedure      Report consisted of patients Situation, Background, Assessment and   Recommendations(SBAR). Information from the following report(s) Kardex, MAR and Recent Results was reviewed with the receiving nurse. Opportunity for questions and clarification was provided. Assessment completed upon patients arrival to unit and care assumed.

## 2017-02-19 NOTE — PROGRESS NOTES
TRANSFER - IN REPORT:    Verbal report received from [address] (name) on Eugena Pipes  being received from Post Op(unit) for routine post - op      Report consisted of patients Situation, Background, Assessment and   Recommendations(SBAR). Information from the following report(s) SBAR, Kardex, OR Summary, Procedure Summary, Intake/Output, MAR, Accordion, Recent Results and Med Rec Status was reviewed with the receiving nurse. Opportunity for questions and clarification was provided. Assessment completed upon patients arrival to unit and care assumed.

## 2017-02-19 NOTE — PROGRESS NOTES
PT note: Therapy orders received. Spoke with RN who requests to hold therapy assessment/mobility at this time due to hypotension.  Will check back in AM.     Chhaya Rodriguez DPT

## 2017-02-19 NOTE — PROGRESS NOTES
TRANSFER - IN REPORT:    Verbal report received from [address] RN(name) on Yuliana Roca  being received from ED(unit) for routine progression of care      Report consisted of patients Situation, Background, Assessment and   Recommendations(SBAR). Information from the following report(s) SBAR, MAR and Recent Results was reviewed with the receiving nurse. Opportunity for questions and clarification was provided. Assessment will be completed upon patients arrival to unit and care assumed.

## 2017-02-19 NOTE — ROUTINE PROCESS
TRANSFER - OUT REPORT:    Verbal report given to [facility] RN on [employer]  being transferred to [address] [place] [address] for routine post - op       Report consisted of patients Situation, Background, Assessment and   Recommendations(SBAR). Information from the following report(s) SBAR, Kardex, OR Summary, Procedure Summary, Intake/Output and MAR was reviewed with the receiving nurse. Lines:   Peripheral IV 02/18/17 Left Antecubital (Active)   Site Assessment Clean, dry, & intact 2/19/2017 10:44 AM   Phlebitis Assessment 0 2/19/2017 10:44 AM   Infiltration Assessment 0 2/19/2017 10:44 AM   Dressing Status Clean, dry, & intact 2/19/2017 10:44 AM   Dressing Type Transparent;Tape 2/19/2017 10:44 AM   Hub Color/Line Status Infusing 2/19/2017 10:44 AM   Alcohol Cap Used No 2/19/2017 10:44 AM        Opportunity for questions and clarification was provided. Patient transported with:   O2 @ 2 liters    VTE prophylaxis orders have been written for [facility]. Patient given room number and nurses name. Family updated re: pt status after security code verified.

## 2017-02-19 NOTE — ED TRIAGE NOTES
Patient arrives via EMS from home with right hip pain. EMS states patient was going to the bathroom when she tripped and fell. EMS gave 2.5 mg morphine, 4mg zofran. BGL 93.

## 2017-02-20 LAB
BASOPHILS # BLD AUTO: 0 K/UL (ref 0–0.2)
BASOPHILS # BLD: 0 % (ref 0–2)
BNP SERPL-MCNC: 483 PG/ML
DIFFERENTIAL METHOD BLD: ABNORMAL
EOSINOPHIL # BLD: 0 K/UL (ref 0–0.8)
EOSINOPHIL NFR BLD: 0 % (ref 0.5–7.8)
ERYTHROCYTE [DISTWIDTH] IN BLOOD BY AUTOMATED COUNT: 13.6 % (ref 11.9–14.6)
HCT VFR BLD AUTO: 24.9 % (ref 35.8–46.3)
HGB BLD-MCNC: 8.3 G/DL (ref 11.7–15.4)
IMM GRANULOCYTES # BLD: 0 K/UL (ref 0–0.5)
IMM GRANULOCYTES NFR BLD AUTO: 0.3 % (ref 0–5)
LYMPHOCYTES # BLD AUTO: 9 % (ref 13–44)
LYMPHOCYTES # BLD: 1.1 K/UL (ref 0.5–4.6)
MCH RBC QN AUTO: 32.2 PG (ref 26.1–32.9)
MCHC RBC AUTO-ENTMCNC: 33.3 G/DL (ref 31.4–35)
MCV RBC AUTO: 96.5 FL (ref 79.6–97.8)
MM INDURATION POC: 0 MM (ref 0–5)
MM INDURATION POC: 0 MM (ref 0–5)
MONOCYTES # BLD: 0.9 K/UL (ref 0.1–1.3)
MONOCYTES NFR BLD AUTO: 8 % (ref 4–12)
NEUTS SEG # BLD: 10.3 K/UL (ref 1.7–8.2)
NEUTS SEG NFR BLD AUTO: 83 % (ref 43–78)
PLATELET # BLD AUTO: 115 K/UL (ref 150–450)
PMV BLD AUTO: 10 FL (ref 10.8–14.1)
PPD POC: NEGATIVE NEGATIVE
PPD POC: NEGATIVE NEGATIVE
RBC # BLD AUTO: 2.58 M/UL (ref 4.05–5.25)
WBC # BLD AUTO: 12.3 K/UL (ref 4.3–11.1)

## 2017-02-20 PROCEDURE — P9016 RBC LEUKOCYTES REDUCED: HCPCS | Performed by: EMERGENCY MEDICINE

## 2017-02-20 PROCEDURE — 83880 ASSAY OF NATRIURETIC PEPTIDE: CPT | Performed by: INTERNAL MEDICINE

## 2017-02-20 PROCEDURE — 30233N1 TRANSFUSION OF NONAUTOLOGOUS RED BLOOD CELLS INTO PERIPHERAL VEIN, PERCUTANEOUS APPROACH: ICD-10-PCS | Performed by: ORTHOPAEDIC SURGERY

## 2017-02-20 PROCEDURE — 36415 COLL VENOUS BLD VENIPUNCTURE: CPT | Performed by: NURSE PRACTITIONER

## 2017-02-20 PROCEDURE — 36430 TRANSFUSION BLD/BLD COMPNT: CPT

## 2017-02-20 PROCEDURE — 77010033678 HC OXYGEN DAILY

## 2017-02-20 PROCEDURE — 97162 PT EVAL MOD COMPLEX 30 MIN: CPT

## 2017-02-20 PROCEDURE — 74011250637 HC RX REV CODE- 250/637: Performed by: NURSE PRACTITIONER

## 2017-02-20 PROCEDURE — 74011250636 HC RX REV CODE- 250/636: Performed by: NURSE PRACTITIONER

## 2017-02-20 PROCEDURE — 74011250637 HC RX REV CODE- 250/637: Performed by: INTERNAL MEDICINE

## 2017-02-20 PROCEDURE — 77030013131 HC IV BLD ST ICUM -A

## 2017-02-20 PROCEDURE — 97166 OT EVAL MOD COMPLEX 45 MIN: CPT

## 2017-02-20 PROCEDURE — 85025 COMPLETE CBC W/AUTO DIFF WBC: CPT | Performed by: NURSE PRACTITIONER

## 2017-02-20 PROCEDURE — 97530 THERAPEUTIC ACTIVITIES: CPT

## 2017-02-20 PROCEDURE — 74011000258 HC RX REV CODE- 258: Performed by: INTERNAL MEDICINE

## 2017-02-20 PROCEDURE — 94760 N-INVAS EAR/PLS OXIMETRY 1: CPT

## 2017-02-20 PROCEDURE — 74011250636 HC RX REV CODE- 250/636: Performed by: INTERNAL MEDICINE

## 2017-02-20 PROCEDURE — 65270000029 HC RM PRIVATE

## 2017-02-20 RX ORDER — FUROSEMIDE 20 MG/1
20 TABLET ORAL DAILY
Status: DISCONTINUED | OUTPATIENT
Start: 2017-02-20 | End: 2017-02-21

## 2017-02-20 RX ORDER — CARVEDILOL 3.12 MG/1
3.12 TABLET ORAL 2 TIMES DAILY WITH MEALS
Status: DISCONTINUED | OUTPATIENT
Start: 2017-02-20 | End: 2017-02-22 | Stop reason: HOSPADM

## 2017-02-20 RX ORDER — SODIUM CHLORIDE 9 MG/ML
250 INJECTION, SOLUTION INTRAVENOUS AS NEEDED
Status: DISCONTINUED | OUTPATIENT
Start: 2017-02-20 | End: 2017-02-22 | Stop reason: HOSPADM

## 2017-02-20 RX ORDER — LORAZEPAM 1 MG/1
1 TABLET ORAL
Status: DISCONTINUED | OUTPATIENT
Start: 2017-02-20 | End: 2017-02-22 | Stop reason: HOSPADM

## 2017-02-20 RX ADMIN — ACETAMINOPHEN 650 MG: 325 TABLET, FILM COATED ORAL at 20:38

## 2017-02-20 RX ADMIN — ENOXAPARIN SODIUM 30 MG: 30 INJECTION SUBCUTANEOUS at 08:28

## 2017-02-20 RX ADMIN — DOCUSATE SODIUM 100 MG: 100 CAPSULE, LIQUID FILLED ORAL at 08:25

## 2017-02-20 RX ADMIN — ACETAMINOPHEN 650 MG: 325 TABLET, FILM COATED ORAL at 13:08

## 2017-02-20 RX ADMIN — CARVEDILOL 3.12 MG: 3.12 TABLET, FILM COATED ORAL at 16:18

## 2017-02-20 RX ADMIN — AMIODARONE HYDROCHLORIDE 100 MG: 200 TABLET ORAL at 08:25

## 2017-02-20 RX ADMIN — TRAMADOL HYDROCHLORIDE 50 MG: 50 TABLET, FILM COATED ORAL at 08:25

## 2017-02-20 RX ADMIN — CALCIUM CARBONATE 500 MG (1,250 MG)-VITAMIN D3 200 UNIT TABLET 1 TABLET: at 13:01

## 2017-02-20 RX ADMIN — FUROSEMIDE 20 MG: 20 TABLET ORAL at 13:01

## 2017-02-20 RX ADMIN — Medication 10 ML: at 05:45

## 2017-02-20 RX ADMIN — DOCUSATE SODIUM 100 MG: 100 CAPSULE, LIQUID FILLED ORAL at 16:18

## 2017-02-20 RX ADMIN — ATORVASTATIN CALCIUM 10 MG: 10 TABLET, FILM COATED ORAL at 08:25

## 2017-02-20 RX ADMIN — ACETAMINOPHEN 650 MG: 325 TABLET, FILM COATED ORAL at 05:44

## 2017-02-20 RX ADMIN — CALCIUM CARBONATE 500 MG (1,250 MG)-VITAMIN D3 200 UNIT TABLET 1 TABLET: at 08:25

## 2017-02-20 RX ADMIN — CEFTRIAXONE 1 G: 1 INJECTION, POWDER, FOR SOLUTION INTRAMUSCULAR; INTRAVENOUS at 16:17

## 2017-02-20 RX ADMIN — CALCIUM CARBONATE 500 MG (1,250 MG)-VITAMIN D3 200 UNIT TABLET 1 TABLET: at 16:18

## 2017-02-20 RX ADMIN — Medication 5 ML: at 20:39

## 2017-02-20 RX ADMIN — LORAZEPAM 1 MG: 1 TABLET ORAL at 20:38

## 2017-02-20 NOTE — CDMP QUERY
Please clarify if this patient is being treated/managed for:    UNDERWEIGHT     BMI  18.29    Ht 5'2\"   Wt  100#        =>Other Explanation of clinical findings  =>Unable to Determine (no explanation of clinical findings)        Please clarify and document your clinical opinion in the progress notes and discharge summary including the definitive and/or presumptive diagnosis, (suspected or probable), related to the above clinical findings. Please include clinical findings supporting your diagnosis.     Thank you,  Candice Bledsoe RN  [phone]

## 2017-02-20 NOTE — PROGRESS NOTES
Problem: Interdisciplinary Rounds  Goal: Interdisciplinary Rounds  Outcome: Progressing Towards Goal  Interdisciplinary team rounds were held 2/20/2017 with the following team members:Care Management, Physical Therapy, Physician and . Plan of care discussed. See clinical pathway and/or care plan for interventions and desired outcomes.

## 2017-02-20 NOTE — PROGRESS NOTES
Care Management Interventions  PCP Verified by CM: Yes  Mode of Transport at Discharge: BLS  Transition of Care Consult (CM Consult): Discharge Planning  Discharge Durable Medical Equipment: No  Physical Therapy Consult: Yes  Occupational Therapy Consult: Yes  Speech Therapy Consult: No  Current Support Network: Relative's Home, Family Lives Nearby  Confirm Follow Up Transport: Family  Plan discussed with Pt/Family/Caregiver: Yes  Freedom of Choice Offered: Yes  Discharge Location  Discharge Placement: Rehab Unit Subacute    Pt is an 79 yo female admitted for surgical repair of a hip fracture. Pt would benefit from STR services at discharge. Family requested referral to [facility] with [facility] as a second choice. Referral submitted to [facility] requesting a bed for Tuesday. Awaiting a response. PPD placed 2/18/17. AUDREY following to facilitate pt's transfer to rehab at discharge. 1358:  Pt has been accepted for admission to [facility] and can transfer there tomorrow if medically cleared for discharge. SW notified pt/family of bed offer and plan.

## 2017-02-20 NOTE — PROGRESS NOTES
Physical Therapy Note:    Physical therapy evaluation orders received and chart reviewed. Attempted to see patient this AM to initiate assessment. HGB: 8.3 and HCT: 24.9. Discussed with primary RN: requested therapist to hold assessment until PRBC. Will follow and re-attempt later this AM per BID plan of care.  Thank you,    Jay Gomez, PT, DPT  9:15AM 2/20/17

## 2017-02-20 NOTE — PROGRESS NOTES
Problem: Mobility Impaired (Adult and Pediatric)  Goal: *Acute Goals and Plan of Care (Insert Text)  STG:  (1.)Ms. Ruthanne Leyden will perform bed mobility with CONTACT GUARD ASSIST within 3 day(s). (2.)Ms. Ruthanne Leyden will transfer from bed to chair and chair to bed with MINIMAL ASSIST x1 using the least restrictive device within 3 day(s). (3.)Ms. Ruthanne Leyden will ambulate with MINIMAL ASSIST for 25+ feet with the least restrictive device within 3 day(s). (4.)Ms. Ruthanne Leyden will tolerate 15+ minutes of therapeutic activity/exercise while maintaining stable vitals to improve functional strength and mobility within 3 day(s). LTG:  (1.)Ms. Ruthanne Leyden will demonstrate fair+ dynamic standing balance utilizing least restrictive assistive device within 7 day(s). (2.)Ms. Ruthanne Leyden will transfer from bed to chair and chair to bed with CONTAC GUARD ASSIST using the least restrictive device within 7 day(s). (3.)Ms. Ruthanne Leyden will ambulate with MINIMAL ASSIST for 75+ feet with the least restrictive device within 7 day(s). (4.)Ms. Ruthanne Leyden will tolerate 25+ minutes of therapeutic activity/exercise while maintaining stable vitals to improve functional strength and mobility within 3 day(s).   ________________________________________________________________________________________________      PHYSICAL THERAPY: INITIAL ASSESSMENT, TREATMENT DAY: DAY OF ASSESSMENT, AM    2/20/2017  INPATIENT: Hospital Day: 3  [place] / [place] / Product Type: Medicare /       WBAT SHANE WILLARD     NAME/AGE/GENDER: Roberto Bridges is a 80 y.o. female           PRIMARY DIAGNOSIS: Right intertroch hip fracture  Hip fracture ([address] Closed intertrochanteric fracture of right femur (HCC) Closed intertrochanteric fracture of right femur (HCC)  Procedure(s) (LRB):  FEMUR INSERTION INTRA MEDULLARY NAIL (Right)  1 Day Post-Op  ICD-10: Treatment Diagnosis:       · Generalized Muscle Weakness (M62.81)  · Difficulty in walking, Not elsewhere classified (R26.2)  · History of falling (Z91.81)   Precaution/Allergies:  Celebrex [celecoxib] and Other medication       ASSESSMENT:      Ms. Nakul Reeys presents is a 80year old female 1 day s/p R femoral IM nailing and is WBAT R LE. Patient seen this AM for initial physical therapy evaluation: presents supine in bed, oriented to person only (not patient's baseline) and visually 4/10 pain with mobility. Patient lives with her daughter/son-in-law in a single story residence with ramp to enter. At baseline, patient is independent with ADLs, ambulates with a RW, and endorses history of falls. Today, B UE ROM WFL and sensation is intact to light touch C4-T1 and L3-S1 B. Patient performed bed mobility with minimal-moderate assistance x1, transfers with minimal assistance x2, and ambulation x5ft to bedside chair with RW and minimal assistance x2. Demonstrated a slow, step-to, antalgic gait pattern on R LE with poor dynamic balance throughout. Good step initiation and weightbearing through R LE appreciated. Ms. Nakul Reyes presents with decreased functional mobility and balance/gait status from baseline. Recommend continued skilled PT services to address stated deficits. Will follow and progress toward stated goals during acute stay. This section established at most recent assessment   PROBLEM LIST (Impairments causing functional limitations):  1. Decreased Strength  2. Decreased ADL/Functional Activities  3. Decreased Transfer Abilities  4. Decreased Ambulation Ability/Technique  5. Decreased Balance  6. Increased Pain  7. Decreased Activity Tolerance  8. Decreased Knowledge of Precautions    INTERVENTIONS PLANNED: (Benefits and precautions of physical therapy have been discussed with the patient.)  1. Balance Exercise  2. Bed Mobility  3. Family Education  4. Gait Training  5. Home Exercise Program (HEP)  6. Range of Motion (ROM)  7. Therapeutic Activites  8. Therapeutic Exercise/Strengthening  9. Transfer Training  10.  Group Therapy      TREATMENT PLAN: Frequency/Duration: twice daily for duration of hospital stay  Rehabilitation Potential For Stated Goals: GOOD      RECOMMENDED REHABILITATION/EQUIPMENT: (at time of discharge pending progress): Continue Skilled Therapy. HISTORY:   History of Present Injury/Illness (Reason for Referral):  S/p ORIF R femur; Difficulty in walking  Past Medical History/Comorbidities:   Ms. Alessandra Oconnor  has a past medical history of Anemia of other chronic disease (4/26/2014); Anxiety; Arrhythmia; CAD (coronary artery disease) (20O7); Chronic systolic heart failure ([facility] Coagulation disorder ([facility] Congestive heart failure ([facility] (08/2007); Contusion of chest wall (4/29/2012); Coronary artery disease involving autologous vein coronary bypass graft without angina pectoris (6/5/2015); Falls (4/23/2014); Generalized OA (6/5/2015); Heart failure ([facility] Hip pain (4/26/2014); History of skin cancer (1992); [place] (hard of hearing); Hygroma (4/23/2014); Hyperglycemia (4/29/2012); Hyperlipidemia; Hypertension; Hypotension (4/23/2014); Mild dementia (12/3/2014); Nausea & vomiting; Neutrophilic leukocytosis (3/34/2526); Osteoarthritis; Osteoporosis; Osteoporosis (12/3/2014); Postmenopausal (12/3/2014); Postmenopausal bone loss; S/P cardiac pacemaker procedure (1/2011); S/P implantation of automatic cardioverter/defibrillator (AICD) (4/29/2012); S/P placement of cardiac pacemaker (4/29/2012); Shoulder fracture (1991); and Syncope (4/29/2012). Ms. Alessandra Oconnor  has a past surgical history that includes biopsy of skin lesion (1992); bladder suspension (1995?); juan and bso (1984); tonsillectomy (as a child); heart catheterization (2007); shoulder arthroscopy (1991); pacemaker (1/13/2011); and ptca (08/2007).   Social History/Living Environment:   Home Environment: Private residence  # Steps to Enter: 0  Wheelchair Ramp: Yes  One/Two Story Residence: One story  Living Alone: No  Support Systems: Family member(s)  Patient Expects to be Discharged to[de-identified] Rehabilitation facility  Current DME Used/Available at Home: Walker, rolling, Wheelchair  Prior Level of Function/Work/Activity:  Patient lives with her daughter/son-in-law in a single story residence with ramp to enter. At baseline, patient is independent with ADLs, ambulates with a RW, and endorses history of falls. Number of Personal Factors/Comorbidities that affect the Plan of Care: 1-2: MODERATE COMPLEXITY   EXAMINATION:   Most Recent Physical Functioning:   Gross Assessment:  AROM: Generally decreased, functional  Strength: Generally decreased, functional  Sensation: Intact (L3-S1 B)               Posture:  Posture (WDL): Exceptions to WDL  Posture Assessment: Kyphosis, Rounded shoulders  Balance:  Sitting: Impaired  Sitting - Static: Good (unsupported)  Sitting - Dynamic: Fair (occasional)  Standing: Impaired  Standing - Static: Fair  Standing - Dynamic : Poor Bed Mobility:  Supine to Sit: Minimum assistance  Sit to Supine: Moderate assistance  Scooting: Contact guard assistance  Wheelchair Mobility:     Transfers:  Sit to Stand: Minimum assistance;Assist x2  Stand to Sit: Minimum assistance;Assist x2  Bed to Chair: Minimum assistance;Assist x2  Interventions: Safety awareness training; Tactile cues; Verbal cues  Gait:     Base of Support: Center of gravity altered  Speed/Divya: Shuffled; Slow  Step Length: Right shortened;Left shortened  Gait Abnormalities: Antalgic;Decreased step clearance;Shuffling gait; Step to gait;Trunk sway increased  Distance (ft): 5 Feet (ft)  Assistive Device: Walker, rolling  Ambulation - Level of Assistance: Minimal assistance; Moderate assistance;Assist x1  Interventions: Safety awareness training; Tactile cues; Verbal cues       Body Structures Involved:  1. Bones  2. Joints  3. Muscles  4. Ligaments Body Functions Affected:  1. Neuromusculoskeletal  2. Movement Related Activities and Participation Affected:  1.  General Tasks and Demands  2. Mobility  3. Self Care  4. Domestic Life   Number of elements that affect the Plan of Care: 4+: HIGH COMPLEXITY   CLINICAL PRESENTATION:   Presentation: Evolving clinical presentation with changing clinical characteristics: MODERATE COMPLEXITY   CLINICAL DECISION MAKIN [facility] Inpatient Short Form  How much difficulty does the patient currently have. .. Unable A Lot A Little None   1. Turning over in bed (including adjusting bedclothes, sheets and blankets)? [ ] 1   [ ] 2   [X] 3   [ ] 4   2. Sitting down on and standing up from a chair with arms ( e.g., wheelchair, bedside commode, etc.)   [ ] 1   [ ] 2   [X] 3   [ ] 4   3. Moving from lying on back to sitting on the side of the bed? [ ] 1   [ ] 2   [X] 3   [ ] 4   How much help from another person does the patient currently need. .. Total A Lot A Little None   4. Moving to and from a bed to a chair (including a wheelchair)? [ ] 1   [X] 2   [ ] 3   [ ] 4   5. Need to walk in hospital room? [ ] 1   [X] 2   [ ] 3   [ ] 4   6. Climbing 3-5 steps with a railing? [ ] 1   [X] 2   [ ] 3   [ ] 4   © , Trustees of [address] [place] [address] [address] [id number], under license to [employer]. All rights reserved    Score:  Initial: 15 Most Recent: 15 (Date: 17 )     Interpretation of Tool:  Represents activities that are increasingly more difficult (i.e. Bed mobility, Transfers, Gait).        Score 24 23 22-20 19-15 14-10 9-7 6       Modifier CH CI CJ CK CL CM CN         · Mobility - Walking and Moving Around:               - CURRENT STATUS:    CK - 40%-59% impaired, limited or restricted               - GOAL STATUS:           CJ - 20%-39% impaired, limited or restricted               - D/C STATUS:                       ---------------To be determined---------------  [place] / [place] / Product Type: Medicare /       Medical Necessity:     · Patient demonstrates good rehab potential due to higher previous functional level. Reason for Services/Other Comments:  · Patient continues to require skilled intervention due to decreased functional mobility and balance/gait status from baseline. .   Use of outcome tool(s) and clinical judgement create a POC that gives a: Questionable prediction of patient's progress: MODERATE COMPLEXITY                 TREATMENT:       Pre-treatment Symptoms/Complaints:    Pain: Initial:   Pain Intensity 1: 0 (at rest; visually 4/10 with mobility)  Pain Location 1: Hip  Pain Orientation 1: Right  Pain Intervention(s) 1: Ambulation/Increased Activity, Emotional support, Position, Repositioned, Rest  Post Session:  Unchanged; comfortable in bedside chair. Initial Evaluation  Therapeutic Activity: (    8 Minutes): Therapeutic activities including bed mobility, transfer training, balance training, safety awareness training, ambulation on level ground x5ft, and patient education to improve mobility, strength, balance and coordination. Required minimal Safety awareness training; Tactile cues; Verbal cues to promote static and dynamic balance in standing and promote coordination of right, lower extremity(s). Braces/Orthotics/Lines/Etc:   · BP monitoring  · O2 Device: Nasal cannula  Treatment/Session Assessment:    · Response to Treatment:  See above. · Interdisciplinary Collaboration:  · Physical Therapist  · Registered Nurse  · Rehabilitation Attendant  · After treatment position/precautions:  · Up in chair  · Bed alarm/tab alert on  · Bed/Chair-wheels locked  · Bed in low position  · Call light within reach  · RN notified  · Family at bedside  · Compliance with Program/Exercises: Will assess as treatment progresses. · Recommendations/Intent for next treatment session: \"Next visit will focus on advancements to more challenging activities and reduction in assistance provided\".      Total Treatment Duration:  PT Patient Time In/Time Out  Time In: 1514  Time Out: [address] [facility], DPT

## 2017-02-20 NOTE — PROGRESS NOTES
Hospitalist Progress Note     Admit Date:  2017  7:26 PM   Name:  Rahul Garcia   Age:  80 y.o.  :  1928   MRN:  [id number]   PCP:  Richelle Washington MD  Treatment Team: Attending Provider: My Mars MD; Utilization Review: Dontae Hfufman RN    Subjective:   Patient is an 79 y/o F with sCHF who was admitted with hip fracture and UTI. operative repair  with some anemia due to operative blood loss.  - pt tired today. Hb dropped from 13.6 pre-op to 8.3 today from operative blood loss. No CP. Mildly SOB. No cough.       Objective:     Patient Vitals for the past 24 hrs:   Temp Pulse Resp BP SpO2   17 0805 97.4 °F (36.3 °C) 92 16 137/77 97 %   17 0414 97.7 °F (36.5 °C) 80 18 158/65 94 %   17 0014 97.2 °F (36.2 °C) 79 18 146/67 94 %   17 2024 97.1 °F (36.2 °C) 68 18 115/78 93 %   17 1912 - 76 - 104/44 -   17 1747 - 60 - 90/45 -   17 1652 - 61 - 107/56 -   17 1640 - 61 - 98/57 -   17 1612 - - - (!) 87/40 -   17 1525 - 61 - 97/46 -   17 1522 - 60 - (!) 96/38 97 %   17 1510 - 61 - 99/47 99 %   17 1500 - 61 - (!) 85/46 -   17 1450 - 60 - 95/42 -   17 1440 - 60 - 95/42 -   17 1420 - 60 - (!) 86/42 -   17 1410 - 61 - (!) 89/44 -   17 1400 - - - (!) 88/44 -   17 1347 - (!) 59 - 95/45 96 %   17 1331 97.4 °F (36.3 °C) 61 16 90/43 95 %   17 1320 - 60 - (!) 86/43 -   17 1310 - 61 - (!) 84/57 -   17 1300 - (!) 53 - (!) 82/44 -   17 1207 97.3 °F (36.3 °C) 60 16 139/69 100 %   17 1130 97.6 °F (36.4 °C) 65 12 158/71 100 %   17 1125 - 67 12 139/65 100 %   17 1120 - 67 12 145/76 100 %   17 1115 - 68 12 157/74 100 %   17 1110 - 68 12 159/61 100 %   17 1105 - 70 12 165/81 98 %   17 1100 - 67 12 156/72 98 %   17 1055 - 75 12 168/79 95 %   17 1050 - 80 12 168/73 97 %   17 1045 - 82 12 157/74 98 %   02/19/17 1044 97.4 °F (36.3 °C) 78 12 156/90 98 %   02/19/17 1040 - - 12 156/90 -     Oxygen Therapy  O2 Sat (%): 97 % (02/20/17 0805)  Pulse via Oximetry: 66 beats per minute (02/19/17 1130)  O2 Device: Nasal cannula (02/19/17 2114)  O2 Flow Rate (L/min): 2 l/min (02/19/17 2114)    Intake/Output Summary (Last 24 hours) at 02/20/17 1021  Last data filed at 02/20/17 0720   Gross per 24 hour   Intake              550 ml   Output              750 ml   Net             -200 ml       General:    Well nourished. Alert. CV:   RRR. No murmur, rub, or gallop. Lungs:   Mild crackles at bases  Abdomen:   Soft, nontender, nondistended. Bowel sounds normal.   Extremities: Warm and dry. No cyanosis or edema. Skin:     No rashes or jaundice.      Current Meds:  Current Facility-Administered Medications   Medication Dose Route Frequency    0.9% sodium chloride infusion 250 mL  250 mL IntraVENous PRN    carvedilol (COREG) tablet 3.125 mg  3.125 mg Oral BID WITH MEALS    sodium chloride (NS) flush 5-10 mL  5-10 mL IntraVENous Q8H    sodium chloride (NS) flush 5-10 mL  5-10 mL IntraVENous PRN    acetaminophen (TYLENOL) tablet 650 mg  650 mg Oral Q8H    oxyCODONE IR (ROXICODONE) tablet 5 mg  5 mg Oral Q4H PRN    ondansetron (ZOFRAN) injection 4 mg  4 mg IntraVENous Q4H PRN    docusate sodium (COLACE) capsule 100 mg  100 mg Oral BID    alum-mag hydroxide-simeth (MYLANTA) oral suspension 30 mL  30 mL Oral Q4H PRN    calcium-vitamin D (OS-SUN) 500 mg-200 unit tablet  1 Tab Oral TID WITH MEALS    enoxaparin (LOVENOX) injection 30 mg  30 mg SubCUTAneous Q24H    cefTRIAXone (ROCEPHIN) 1 g in 0.9% sodium chloride (MBP/ADV) 50 mL  1 g IntraVENous Q24H    traMADol (ULTRAM) tablet 50 mg  50 mg Oral Q6H PRN    oxyCODONE IR (ROXICODONE) tablet 5 mg  5 mg Oral Q4H PRN    albuterol (PROVENTIL HFA, VENTOLIN HFA, PROAIR HFA) inhaler 1 Puff  1 Puff Inhalation Q4H PRN    amiodarone (CORDARONE) tablet 100 mg  100 mg Oral DAILY    atorvastatin (LIPITOR) tablet 10 mg  10 mg Oral DAILY       Labs and Studies:  I have reviewed all labs, meds, telemetry events, and studies from the last 24 hours. Recent Results (from the past 24 hour(s))   PLEASE READ & DOCUMENT PPD TEST IN 24 HRS    Collection Time: 02/19/17 11:42 PM   Result Value Ref Range    PPD Negative Negative    mm Induration 0 mm   CBC WITH AUTOMATED DIFF    Collection Time: 02/20/17  7:08 AM   Result Value Ref Range    WBC 12.3 (H) 4.3 - 11.1 K/uL    RBC 2.58 (L) 4.05 - 5.25 M/uL    HGB 8.3 (L) 11.7 - 15.4 g/dL    HCT 24.9 (L) 35.8 - 46.3 %    MCV 96.5 79.6 - 97.8 FL    MCH 32.2 26.1 - 32.9 PG    MCHC 33.3 31.4 - 35.0 g/dL    RDW 13.6 11.9 - 14.6 %    PLATELET 965 (L) 759 - 450 K/uL    MPV 10.0 (L) 10.8 - 14.1 FL    DF AUTOMATED      NEUTROPHILS 83 (H) 43 - 78 %    LYMPHOCYTES 9 (L) 13 - 44 %    MONOCYTES 8 4.0 - 12.0 %    EOSINOPHILS 0 (L) 0.5 - 7.8 %    BASOPHILS 0 0.0 - 2.0 %    IMMATURE GRANULOCYTES 0.3 0.0 - 5.0 %    ABS. NEUTROPHILS 10.3 (H) 1.7 - 8.2 K/UL    ABS. LYMPHOCYTES 1.1 0.5 - 4.6 K/UL    ABS. MONOCYTES 0.9 0.1 - 1.3 K/UL    ABS. EOSINOPHILS 0.0 0.0 - 0.8 K/UL    ABS. BASOPHILS 0.0 0.0 - 0.2 K/UL    ABS. IMM. GRANS. 0.0 0.0 - 0.5 K/UL        All Micro Results     Procedure Component Value Units Date/Time    MSSA/MRSA SC BY PCR, NASAL SWAB [[id number]] Collected:  02/18/17 4311    Order Status:  Completed Specimen:  Swab Updated:  02/19/17 8276     Special Requests: NO SPECIAL REQUESTS        Culture result:         SA target not detected. A MRSA NEGATIVE, SA NEGATIVE test result does not preclude MRSA or SA nasal colonization. Recent Imaging:  CXR Results  (Last 48 hours)               02/18/17 2013  XR CHEST SNGL V Final result    Impression:  IMPRESSION: Comminuted right intertrochanteric hip fracture. Right femur: AP and lateral views of the right femur were obtained.  No prior   studies are available for comparison. Finding: No additional acute fracture or dislocation is present. There is   diffuse osteopenia. The soft tissues are unremarkable. IMPRESSION: No additional acute fracture identified. Portable chest:       Comparison: 10/18/2015       Findings: A single view of the chest was obtained at 1956 hours. A dual-chamber   cardiac pacer device is present. The patient is slightly rotated towards the   right. The cardiac and mediastinal silhouette are normal in size and configuration. There is mild right basilar scarring. There are no pleural effusions. The   pulmonary vascularity is within normal limits. Deformity to the left humeral   neck suggest remote traumatic injury. Impression: No acute findings by plain film evaluation. Narrative:  Right hip, right femur, portable chest       HISTORY: Right hip pain after fall. Right hip: 3 views of the right were obtained. Comparison was made to the prior   exam dated 04/26/2014. [facility] FINDINGS: There is a comminuted intertrochanteric hip fracture on the right. There is nearly 90 degrees of varus angulation of the dominant fracture   fragments. No additional acute fracture or dislocation is present. There is   diffuse osteopenia.                CT Results  (Last 48 hours)    None          Assessment and Plan:     Hospital Problems as of 2/20/2017  Date Reviewed: 1/10/2017          Codes Class Noted - Resolved POA    Acute cystitis without hematuria ICD-10-CM: N30.00  ICD-9-CM: 595.0  2/19/2017 - Present Yes        * (Principal)Closed intertrochanteric fracture of right femur ([facility] ICD-10-CM: L68.963J  ICD-9-CM: 820.21  2/18/2017 - Present Yes        Chronic systolic congestive heart failure (HCC) (Chronic) ICD-10-CM: I50.22  ICD-9-CM: 428.22, 428.0  5/24/2016 - Present Yes        CAD (coronary artery disease) (Chronic) ICD-10-CM: I25.10  ICD-9-CM: 414.00  Unknown - Present Yes    Overview Signed 4/23/2014  4:10 PM by Ashchin Lujan [place]     MI, 2 STENT ,ARRHYTHMIA             S/P implantation of automatic cardioverter/defibrillator (AICD) (Chronic) ICD-10-CM: Z95.810  ICD-9-CM: V45.02  4/29/2012 - Present Yes                PLAN:    · Cont rocephin for UTI. Monitor CBC  · Hip fracture surgery done yesterday. Cont PT/OT. Resume home ASA/plavix when OK with ortho  · CHF stable, caution with IVF. Resume home coreg. Might be slightly overloaded on exam today. Mild crackles at bases. Will check BNP and restart home lasix (says she only takes daily as needed)  · Anemic post-op. Monitor. Transfuse if Hb < 8 per guidelines. Caution with CHF  · Other listed conditions stable, cont current    DC planning:  STR in a few days. Ppd done. SW consulted.   DVT ppx:  lovenox  Discussed plan with:  Pt and family    Signed:  Erendira Lopez MD

## 2017-02-20 NOTE — PROGRESS NOTES
Problem: Self Care Deficits Care Plan (Adult)  Goal: *Acute Goals and Plan of Care (Insert Text)  1. Patient will complete functional transfers with supervision while maintaining WBAT status in RLE and with adaptive equipment as needed. 2. Patient will complete lower body bathing and dressing with minimal assistance and adaptive equipment as needed. 3. Patient will complete toileting and toilet transfer with supervision. 4. Patient will tolerate 20 minutes of OT treatment with as needed rest breaks to increase activity tolerance for ADLs. 5. Patient will participate in at least 20 minutes of BUE therapeutic exercises to strengthen BUE for functional transfers. Timeframe: 7 visits       OCCUPATIONAL THERAPY: Initial Assessment 2/20/2017  INPATIENT: Hospital Day: 3  [place] / Product Type: Medicare /      NAME/AGE/GENDER: Galo Maya is a 80 y.o. female           PRIMARY DIAGNOSIS:  Right intertroch hip fracture  Hip fracture ([address] Closed intertrochanteric fracture of right femur ([address] Closed intertrochanteric fracture of right femur (HCC)  Procedure(s) (LRB):  FEMUR INSERTION INTRA MEDULLARY NAIL (Right)  1 Day Post-Op  ICD-10: Treatment Diagnosis:        · Pain in Right Hip (M25.551)  · Stiffness of Right Hip, Not elsewhere classified (M25.651)  · History of falling (Z91.81)   Precautions/Allergies:        WBAT RLE    Celebrex [celecoxib] and Other medication       ASSESSMENT:      Ms. Carlos Myrick is an 80year old female admitted after fall, now s/p above procedure and WBAT in RLE. Patient lives with her daughter and son in law at baseline. She is typically independent with ADLs, but does need help with shower transfers. She uses a rollator at all times for mobility and a wheelchair for longer community distances. She does not drive. Patient sitting up in chair upon arrival after just finishing PT evaluation. Reports no pain at rest. Oriented to person. Agreeable to OT assessment. Was able to stand with minimal assistance and take several steps forward with minimal assistance and rolling walker and one step backwards with moderate assistance and rolling walker. Patient is currently functioning below her baseline due to recent surgery, but would benefit from acute OT to increase independence and safety. Will follow. This section established at most recent assessment   PROBLEM LIST (Impairments causing functional limitations):  1. Decreased Strength  2. Decreased ADL/Functional Activities  3. Decreased Transfer Abilities  4. Decreased Ambulation Ability/Technique  5. Decreased Balance  6. Increased Pain  7. Decreased Activity Tolerance  8. Decreased Flexibility/Joint Mobility  9. Decreased Knowledge of Precautions    INTERVENTIONS PLANNED: (Benefits and precautions of occupational therapy have been discussed with the patient.)  1. Activities of daily living training  2. Adaptive equipment training  3. Group therapy  4. Theraputic activity  5. Theraputic exercise      TREATMENT PLAN: Frequency/Duration: Follow patient 6x/ week to address above goals. Rehabilitation Potential For Stated Goals: FAIR      RECOMMENDED REHABILITATION/EQUIPMENT: (at time of discharge pending progress): Continue Skilled Therapy. OCCUPATIONAL PROFILE AND HISTORY:   History of Present Injury/Illness (Reason for Referral):  Per H&PGonsalo Rater is a 80 y.o.  female who presents with right hip pain after falling in the bathroom. She denies passing out and just lost her balance. She has hx of CHF with EF in the 20% range with ICD/Pacemaker. She currently denies any SOB, Orthopnea, DESOUZA, or chest pain. Her XR of right hip confirms a hip fracture. CXR is clear and without any signs of volume overload. \"  Past Medical History/Comorbidities:   Ms. Slime Li  has a past medical history of Anemia of other chronic disease (4/26/2014);  Anxiety; Arrhythmia; CAD (coronary artery disease) (20O7); Chronic systolic heart failure [address] Coagulation disorder ([facility] [address] Congestive heart failure ([facility] [address] [facility] (08/2007); Contusion of chest wall (4/29/2012); Coronary artery disease involving autologous vein coronary bypass graft without angina pectoris (6/5/2015); Falls (4/23/2014); Generalized OA (6/5/2015); Heart failure ([facility] [address] Hip pain (4/26/2014); History of skin cancer (1992); [place] (hard of hearing); Hygroma (4/23/2014); Hyperglycemia (4/29/2012); Hyperlipidemia; Hypertension; Hypotension (4/23/2014); Mild dementia (12/3/2014); Nausea & vomiting; Neutrophilic leukocytosis (2/12/1013); Osteoarthritis; Osteoporosis; Osteoporosis (12/3/2014); Postmenopausal (12/3/2014); Postmenopausal bone loss; S/P cardiac pacemaker procedure (1/2011); S/P implantation of automatic cardioverter/defibrillator (AICD) (4/29/2012); S/P placement of cardiac pacemaker (4/29/2012); Shoulder fracture (1991); and Syncope (4/29/2012). Ms. Anatoliy Hernandez  has a past surgical history that includes biopsy of skin lesion (1992); bladder suspension (1995?); juan and bso (1984); tonsillectomy (as a child); heart catheterization (2007); shoulder arthroscopy (1991); pacemaker (1/13/2011); and ptca (08/2007). Social History/Living Environment:   Home Environment: Private residence  # Steps to Enter: 0  Wheelchair Ramp: Yes  One/Two Story Residence: One story  Living Alone: No  Support Systems: Family member(s)  Patient Expects to be Discharged to[de-identified] Rehabilitation facility  Current DME Used/Available at Home: [employer], apoorva, Wheelchair  Prior Level of Function/Work/Activity:  Patient lives with her daughter and son in law. She is independent with ADLs but gets assistance with shower transfers. She does not drive. She uses a rollator for mobility at all times. Uses a wheelchair for longer distances. Personal Factors:          Sex:  female        Age:  80 y.o.    Number of Personal Factors/Comorbidities that affect the Plan of Care: Extensive review of physical, cognitive, and psychosocial performance (3+):  HIGH COMPLEXITY   ASSESSMENT OF OCCUPATIONAL PERFORMANCE[de-identified]   Activities of Daily Living:           Basic ADLs (From Assessment) Complex ADLs (From Assessment)   Basic ADL  Feeding: Setup  Oral Facial Hygiene/Grooming: Minimum assistance  Bathing: Maximum assistance  Upper Body Dressing: Minimum assistance  Lower Body Dressing: Maximum assistance  Toileting: Maximum assistance Instrumental ADL  Meal Preparation: Total assistance  Homemaking: Total assistance  Medication Management: Total assistance  Financial Management: Total assistance   Grooming/Bathing/Dressing Activities of Daily Living     Cognitive Retraining  Safety/Judgement: Awareness of environment; Fall prevention                       Bed/Mat Mobility  Supine to Sit: Minimum assistance  Sit to Stand: Minimum assistance  Bed to Chair: Minimum assistance; Moderate assistance;Assist x2  Scooting: Minimum assistance          Most Recent Physical Functioning:   Gross Assessment:  AROM: Generally decreased, functional (BUE)  Strength: Generally decreased, functional (BUE)               Posture:  Posture (WDL): Exceptions to WDL  Posture Assessment: Kyphosis, Rounded shoulders  Balance:  Sitting: Intact; With support  Sitting - Static: Good (unsupported)  Sitting - Dynamic: Fair (occasional)  Standing: Impaired  Standing - Static: Poor  Standing - Dynamic : Poor Bed Mobility:  Supine to Sit: Minimum assistance  Scooting: Minimum assistance  Wheelchair Mobility:     Transfers:  Sit to Stand: Minimum assistance  Stand to Sit: Minimum assistance  Bed to Chair: Minimum assistance; Moderate assistance;Assist x2  Interventions: Safety awareness training; Tactile cues; Verbal cues                    Patient Vitals for the past 6 hrs:       BP BP Patient Position SpO2 Pulse   02/20/17 1240 108/49 At rest 95 % 77   02/20/17 1259 107/51 At rest - 74   02/20/17 1359 104/54 - - 72   02/20/17 1430 112/54 - - 75        Mental Status  Neurologic State: Alert, Confused  Orientation Level: Oriented to person  Cognition: Follows commands  Perception: Appears intact  Perseveration: No perseveration noted  Safety/Judgement: Awareness of environment, Fall prevention                               Physical Skills Involved:  1. Range of Motion  2. Balance  3. Mobility  4. Strength  5. Endurance Cognitive Skills Affected (resulting in the inability to perform in a timely and safe manner):  1. Attending  2. Remembering Psychosocial Skills Affected:  1. Interpersonal Interactions  2. Routines and Behaviors  3. Environmental Adaptations   Number of elements that affect the Plan of Care: 5+:  HIGH COMPLEXITY   CLINICAL DECISION MAKIN [facility] Mobility Inpatient Short Form  How much help from another person does the patient currently need. .. Total A Lot A Little None   1. Putting on and taking off regular lower body clothing?   [ ] 1   [X] 2   [ ] 3   [ ] 4   2. Bathing (including washing, rinsing, drying)? [ ] 1   [X] 2   [ ] 3   [ ] 4   3. Toileting, which includes using toilet, bedpan or urinal?   [ ] 1   [X] 2   [ ] 3   [ ] 4   4. Putting on and taking off regular upper body clothing?   [ ] 1   [X] 2   [ ] 3   [ ] 4   5. Taking care of personal grooming such as brushing teeth? [ ] 1   [ ] 2   [X] 3   [ ] 4   6. Eating meals? [ ] 1   [ ] 2   [X] 3   [ ] 4   © , Trustees of [address] [place] [address], under license to [employer]. All rights reserved    Score:  Initial: 14 Most Recent: X (Date: -- )     Interpretation of Tool:  Represents activities that are increasingly more difficult (i.e. Bed mobility, Transfers, Gait).        Score 24 23 22-20 19-15 14-10 9-7 6       Modifier CH CI CJ CK CL CM CN         · Self Care:               - CURRENT STATUS:    CL - 60%-79% impaired, limited or restricted               - GOAL STATUS:           CK - 40%-59% impaired, limited or restricted               - D/C STATUS:                       ---------------To be determined---------------  [place] / Product Type: Medicare /       Medical Necessity:     · Patient demonstrates good rehab potential due to higher previous functional level. Reason for Services/Other Comments:  · Patient continues to require present interventions due to patient's inability to care for self at pre-op level of function. Use of outcome tool(s) and clinical judgement create a POC that gives a: MODERATE COMPLEXITY             TREATMENT:   (In addition to Assessment/Re-Assessment sessions the following treatments were rendered)      Pre-treatment Symptoms/Complaints:  None   Pain: Initial:   Pain Intensity 1: 0  Post Session:  same      Assessment/Reassessment only, no treatment provided today     Braces/Orthotics/Lines/Etc:   · IV  · O2 Device: Nasal cannula  Treatment/Session Assessment:    · Response to Treatment:  Tolerated well  · Interdisciplinary Collaboration:  · Occupational Therapist  · Registered Nurse  · After treatment position/precautions:  · Up in chair  · Bed alarm/tab alert on  · Bed/Chair-wheels locked  · Call light within reach  · RN notified  · Family at bedside  · Compliance with Program/Exercises: compliant all of the time. · Recommendations/Intent for next treatment session: \"Next visit will focus on advancements to more challenging activities and reduction in assistance provided\".   Total Treatment Duration:  OT Patient Time In/Time Out  Time In: 1121  Time Out: [address] [facility] MAYRA LewisR/L

## 2017-02-20 NOTE — PROGRESS NOTES
Problem: Mobility Impaired (Adult and Pediatric)  Goal: *Acute Goals and Plan of Care (Insert Text)  STG:  (1.)Ms. Yola Lemons will perform bed mobility with CONTACT GUARD ASSIST within 3 day(s). (2.)Ms. Yola Lemons will transfer from bed to chair and chair to bed with MINIMAL ASSIST x1 using the least restrictive device within 3 day(s). (3.)Ms. Yola Lemons will ambulate with MINIMAL ASSIST for 25+ feet with the least restrictive device within 3 day(s). (4.)Ms. Yola Lemons will tolerate 15+ minutes of therapeutic activity/exercise while maintaining stable vitals to improve functional strength and mobility within 3 day(s). LTG:  (1.)Ms. Yola Lemons will demonstrate fair+ dynamic standing balance utilizing least restrictive assistive device within 7 day(s). (2.)Ms. Yola Lemons will transfer from bed to chair and chair to bed with CONTAC GUARD ASSIST using the least restrictive device within 7 day(s). (3.)Ms. Yola Lemons will ambulate with MINIMAL ASSIST for 75+ feet with the least restrictive device within 7 day(s). (4.)Ms. Yola Lemons will tolerate 25+ minutes of therapeutic activity/exercise while maintaining stable vitals to improve functional strength and mobility within 3 day(s).   ________________________________________________________________________________________________      PHYSICAL THERAPY: Daily Note, Treatment Day: Day of Assessment and PM    2/20/2017  INPATIENT: Hospital Day: 3  [place] / Product Type: Medicare /       WBTAY WILLARD     NAME/AGE/GENDER: Sumit Hernández is a 80 y.o. female           PRIMARY DIAGNOSIS: Right intertroch hip fracture  Hip fracture ([address] Closed intertrochanteric fracture of right femur (HCC) Closed intertrochanteric fracture of right femur (HCC)  Procedure(s) (LRB):  FEMUR INSERTION INTRA MEDULLARY NAIL (Right)  1 Day Post-Op  ICD-10: Treatment Diagnosis:       · Generalized Muscle Weakness (M62.81)  · Difficulty in walking, Not elsewhere classified (R26.2)  · History of falling (Z91.81)   Precaution/Allergies:  Celebrex [celecoxib] and Other medication       ASSESSMENT:      Ms. Carlos Myrick presents is a 80year old female 1 day s/p R femoral IM nailing and is WBAT R LE. Patient seen this AM for initial physical therapy evaluation: presents supine in bed, oriented to person only (not patient's baseline) and visually 4/10 pain with mobility. Patient lives with her daughter/son-in-law in a single story residence with ramp to enter. At baseline, patient is independent with ADLs, ambulates with a RW, and endorses history of falls. Today, B UE ROM WFL and sensation is intact to light touch C4-T1 and L3-S1 B. Patient performed bed mobility with minimal-moderate assistance x1, transfers with minimal assistance x2, and ambulation x5ft to bedside chair with RW and minimal assistance x2. Demonstrated a slow, step-to, antalgic gait pattern on R LE with poor dynamic balance throughout. Good step initiation and weightbearing through R LE appreciated. Ms. Carlos Myrick presents with decreased functional mobility and balance/gait status from baseline. Recommend continued skilled PT services to address stated deficits. Will follow and progress toward stated goals during acute stay. PM: Patient presents sitting up in bedside chair, oriented to person only, impulsive attempting to get out of the chair, and endorses 0/10 pain at rest. Performed transfers with minimal assistance x2, and ambulation x5ft bed to chair with minimal-moderate assistance x2 and RW. Demonstrated a slow, step-to, antalgic gait pattern with poor dynamic standing balance and posterior lean. Required verbal and manual cues for gait mechanics, RW negotiation, and dynamic standing balance. Progress limited this PM by command following. Continue with plan of care. This section established at most recent assessment   PROBLEM LIST (Impairments causing functional limitations):  1. Decreased Strength  2.  Decreased ADL/Functional Activities  3. Decreased Transfer Abilities  4. Decreased Ambulation Ability/Technique  5. Decreased Balance  6. Increased Pain  7. Decreased Activity Tolerance  8. Decreased Knowledge of Precautions    INTERVENTIONS PLANNED: (Benefits and precautions of physical therapy have been discussed with the patient.)  1. Balance Exercise  2. Bed Mobility  3. Family Education  4. Gait Training  5. Home Exercise Program (HEP)  6. Range of Motion (ROM)  7. Therapeutic Activites  8. Therapeutic Exercise/Strengthening  9. Transfer Training  10. Group Therapy      TREATMENT PLAN: Frequency/Duration: twice daily for duration of hospital stay  Rehabilitation Potential For Stated Goals: GOOD      RECOMMENDED REHABILITATION/EQUIPMENT: (at time of discharge pending progress): Continue Skilled Therapy. HISTORY:   History of Present Injury/Illness (Reason for Referral):  S/p ORIF R femur; Difficulty in walking  Past Medical History/Comorbidities:   Ms. Lina Tompkins  has a past medical history of Anemia of other chronic disease (4/26/2014); Anxiety; Arrhythmia; CAD (coronary artery disease) (20O7); Chronic systolic heart failure ([address] Coagulation disorder ([facility] [address] Congestive heart failure ([address] (08/2007); Contusion of chest wall (4/29/2012); Coronary artery disease involving autologous vein coronary bypass graft without angina pectoris (6/5/2015); Falls (4/23/2014); Generalized OA (6/5/2015); Heart failure ([address] Hip pain (4/26/2014); History of skin cancer (1992); [place] (hard of hearing); Hygroma (4/23/2014); Hyperglycemia (4/29/2012); Hyperlipidemia; Hypertension; Hypotension (4/23/2014); Mild dementia (12/3/2014); Nausea & vomiting; Neutrophilic leukocytosis (7/92/8237); Osteoarthritis; Osteoporosis; Osteoporosis (12/3/2014); Postmenopausal (12/3/2014); Postmenopausal bone loss; S/P cardiac pacemaker procedure (1/2011); S/P implantation of automatic cardioverter/defibrillator (AICD) (4/29/2012);  S/P placement of cardiac pacemaker (4/29/2012); Shoulder fracture (1991); and Syncope (4/29/2012). Ms. Ruthanne Leyden  has a past surgical history that includes biopsy of skin lesion (1992); bladder suspension (1995?); juan and bso (1984); tonsillectomy (as a child); heart catheterization (2007); shoulder arthroscopy (1991); pacemaker (1/13/2011); and ptca (08/2007). Social History/Living Environment:   Home Environment: Private residence  # Steps to Enter: 0  Wheelchair Ramp: Yes  One/Two Story Residence: One story  Living Alone: No  Support Systems: Family member(s)  Patient Expects to be Discharged to[de-identified] Rehabilitation facility  Current DME Used/Available at Home: Walker, rolling, Wheelchair  Prior Level of Function/Work/Activity:  Patient lives with her daughter/son-in-law in a single story residence with ramp to enter. At baseline, patient is independent with ADLs, ambulates with a RW, and endorses history of falls. Number of Personal Factors/Comorbidities that affect the Plan of Care: 1-2: MODERATE COMPLEXITY   EXAMINATION:   Most Recent Physical Functioning:   Gross Assessment:  AROM: Generally decreased, functional  Strength: Generally decreased, functional  Sensation: Intact (L3-S1 B)               Posture:  Posture (WDL): Exceptions to WDL  Posture Assessment: Kyphosis, Rounded shoulders  Balance:  Sitting: Impaired  Sitting - Static: Good (unsupported)  Sitting - Dynamic: Fair (occasional)  Standing: Impaired  Standing - Static: Fair  Standing - Dynamic : Poor Bed Mobility:  Supine to Sit: Minimum assistance  Sit to Supine: Moderate assistance  Scooting: Contact guard assistance  Wheelchair Mobility:     Transfers:  Sit to Stand: Minimum assistance;Assist x2  Stand to Sit: Minimum assistance;Assist x2  Bed to Chair: Minimum assistance;Assist x2  Interventions: Safety awareness training; Tactile cues; Verbal cues  Gait:     Base of Support: Center of gravity altered  Speed/Divya: Shuffled; Slow  Step Length: Right shortened;Left shortened  Gait Abnormalities: Antalgic;Decreased step clearance;Shuffling gait; Step to gait;Trunk sway increased  Distance (ft): 5 Feet (ft)  Assistive Device: Walker, rolling  Ambulation - Level of Assistance: Minimal assistance; Moderate assistance;Assist x1  Interventions: Safety awareness training; Tactile cues; Verbal cues       Body Structures Involved:  1. Bones  2. Joints  3. Muscles  4. Ligaments Body Functions Affected:  1. Neuromusculoskeletal  2. Movement Related Activities and Participation Affected:  1. General Tasks and Demands  2. Mobility  3. Self Care  4. Domestic Life   Number of elements that affect the Plan of Care: 4+: HIGH COMPLEXITY   CLINICAL PRESENTATION:   Presentation: Evolving clinical presentation with changing clinical characteristics: MODERATE COMPLEXITY   CLINICAL DECISION MAKIN [facility] Inpatient Short Form  How much difficulty does the patient currently have. .. Unable A Lot A Little None   1. Turning over in bed (including adjusting bedclothes, sheets and blankets)? [ ] 1   [ ] 2   [X] 3   [ ] 4   2. Sitting down on and standing up from a chair with arms ( e.g., wheelchair, bedside commode, etc.)   [ ] 1   [ ] 2   [X] 3   [ ] 4   3. Moving from lying on back to sitting on the side of the bed? [ ] 1   [ ] 2   [X] 3   [ ] 4   How much help from another person does the patient currently need. .. Total A Lot A Little None   4. Moving to and from a bed to a chair (including a wheelchair)? [ ] 1   [X] 2   [ ] 3   [ ] 4   5. Need to walk in hospital room? [ ] 1   [X] 2   [ ] 3   [ ] 4   6. Climbing 3-5 steps with a railing? [ ] 1   [X] 2   [ ] 3   [ ] 4   © , Trustees of [address] [place] [address] [address] [address], under license to [employer].  All rights reserved    Score:  Initial: 15 Most Recent: 15 (Date: 17 )     Interpretation of Tool:  Represents activities that are increasingly more difficult (i.e. Bed mobility, Transfers, Gait). Score 24 23 22-20 19-15 14-10 9-7 6       Modifier CH CI CJ CK CL CM CN         · Mobility - Walking and Moving Around:               - CURRENT STATUS:    CK - 40%-59% impaired, limited or restricted               - GOAL STATUS:           CJ - 20%-39% impaired, limited or restricted               - D/C STATUS:                       ---------------To be determined---------------  [place] / Product Type: Medicare /       Medical Necessity:     · Patient demonstrates good rehab potential due to higher previous functional level. Reason for Services/Other Comments:  · Patient continues to require skilled intervention due to decreased functional mobility and balance/gait status from baseline. .   Use of outcome tool(s) and clinical judgement create a POC that gives a: Questionable prediction of patient's progress: MODERATE COMPLEXITY                 TREATMENT:       Pre-treatment Symptoms/Complaints:    Pain: Initial:   Pain Intensity 1: 0 (at rest; visually 4/10 with mobility)  Pain Location 1: Hip  Pain Orientation 1: Right  Pain Intervention(s) 1: Ambulation/Increased Activity, Emotional support, Position, Repositioned, Rest  Post Session:  Unchanged; comfortable in bedside chair. Therapeutic Activity: (    14 Minutes): Therapeutic activities including bed mobility, transfer training, balance training, safety awareness training, ambulation on level ground x5ft, and patient education to improve mobility, strength, balance and coordination. Required minimal Safety awareness training; Tactile cues; Verbal cues to promote static and dynamic balance in standing and promote coordination of right, lower extremity(s). Braces/Orthotics/Lines/Etc:   · BP monitoring  · O2 Device: Nasal cannula  Treatment/Session Assessment:    · Response to Treatment:  See above.   · Interdisciplinary Collaboration:  · Physical Therapist  · Registered Nurse  · Rehabilitation Attendant  · After treatment position/precautions:  · Supine in bed, Bed alarm/tab alert on, Bed/Chair-wheels locked, Bed in low position, Call light within reach, RN notified, Family at bedside and Side rails x 3  · Compliance with Program/Exercises: Will assess as treatment progresses. · Recommendations/Intent for next treatment session: \"Next visit will focus on advancements to more challenging activities and reduction in assistance provided\".      Total Treatment Duration:  PT Patient Time In/Time Out  Time In: 1514  Time Out: [address] [facility], [facility]

## 2017-02-20 NOTE — OP NOTES
Operative Report     Patient: Ashish Tristan MRN: [id number]  SSN: [id number]    YOB: 1928  Age: 80 y.o. Sex: female      Indications: Ashish Tristan was admitted to the hospital with a brief history of right intertrochanteric hip fracture. The patient now presents for orif. Date of Surgery: 2/19/2017     Preoperative Diagnosis:  Right intertroch hip fracture    Postoperative Diagnosis: Right intertrochcanteric femur fracture    Surgeon(s) and Role:     * Shefali Monaco MD - Primary     Anesthesia: Spinal    Procedures: Procedure(s): FEMUR INSERTION INTRA MEDULLARY NAIL       Procedure in Detail:  The patient was brought to the operative suite and placed in supine position. After adequate anesthesia was achieved, the patient was placed upon the fracture table. Peroneal post and foot holders were well padded. The patient underwent a closed reduction of the right intertrochanteric hip fracture. This was achieved by longitudinal traction, internal rotation, and adduction. AP and lateral fluoroscopic images confirmed the fracture was now reduced anatomically. right hip was then prepped and draped in the usual sterile fashion. A 3 cm incision was made over the tip of the greater trochanter. Hemostasis was achieved with Bovie cautery. Guide pin for a Synthes trochanteric fixation nail was inserted through the tip of the trochanter, across the fracture site, and into the canal of the femur. Its position was confirmed by fluoroscopy. The proximal reamer was then passed by hand over this guide pin in order to open up a proximal portal.  Guide pin and reamer were removed a ball-tip guide wire was inserted through this portal, across the fracture site, and down to the epiphyseal scar of the knee. The position of the guide wire was confirmed by AP and lateral fluoroscopic images. The 11.5 mm reamer was passed as a sound, and reaming was only performed by power if necessary.   The Amiato trochanteric fixation nail was then passed over the guide wire without difficulty. The guide wire was removed and the targeting guide was inserted through a stab wound in the lateral aspect of the proximal femur. Guide pin was then inserted through the lateral cortex into the neck and head. It stopped just short of the subchondral bone. AP and lateral fluoroscopic images confirmed that the position of the guide pin was centered in the head. Depth gauge was used to determine the appropriate length helical blade. The reamers were passed over the guide pin in order to open up the lateral cortex neck and head. The appropriate length helical blade was then passed over the guide wire without difficulty. The set screw was passed from above with a spring wrench. Traction was removed. The position of the helical blade was confirmed by fluoroscopy. The fracture was then compressed to a stable position, using the proximal targeting guide. At this point, the targeting guides were removed. AP and lateral fluoroscopic images confirmed the fracture was reduced anatomically. Wounds were then irrigated with normal saline and closed in layers. Sterile, compressive dressings were applied. The patient was then removed from the fracture table and transferred to the postanesthesia care unit, alert and oriented, under the care of anesthesia. Estimated Blood Loss: 50 cc    Specimens: * No specimens in log *      Implants:   Implant Name Type Inv.  Item Serial No.  Lot No. LRB No. Used Action   NAIL CAR 130D [id number] RT -- TFNA STRL - [id number]  NAIL CAR 130D [id number] RT -- TFNA [facility] [id number] Right 1 Implanted   BLADE HELCL ADV TFNA 90MM STRL --  - [id number]   BLADE HELCL ADV TFNA 90MM STRL --    [employer] [place] [id number] Right 1 Implanted       Tourniquet Time: * No tourniquets in log *     Closure: Primary    Complications: None     Signed By: Reyes Flaming, MD     February 19, 2017

## 2017-02-20 NOTE — PROGRESS NOTES
OT evaluation orders received and chart reviewed. HGB: 8.3 and HCT: 24.9. Per RN: hold assessment until PRBC. Will follow and re-attempt as schedule permits.    Thank you for this consult,   Brent Werner OTR/NOLAN

## 2017-02-21 PROBLEM — B99.9 ENCEPHALOPATHY DUE TO INFECTION: Status: ACTIVE | Noted: 2017-02-21

## 2017-02-21 PROBLEM — G93.49 ENCEPHALOPATHY DUE TO INFECTION: Status: ACTIVE | Noted: 2017-02-21

## 2017-02-21 PROBLEM — R63.6 UNDERWEIGHT: Status: ACTIVE | Noted: 2017-02-21

## 2017-02-21 LAB
25(OH)D3+25(OH)D2 SERPL-MCNC: 47.4 NG/ML (ref 30–100)
ANION GAP BLD CALC-SCNC: 6 MMOL/L (ref 7–16)
BASOPHILS # BLD AUTO: 0 K/UL (ref 0–0.2)
BASOPHILS # BLD: 0 % (ref 0–2)
BNP SERPL-MCNC: 832 PG/ML
BUN SERPL-MCNC: 15 MG/DL (ref 8–23)
CALCIUM SERPL-MCNC: 8.9 MG/DL (ref 8.3–10.4)
CHLORIDE SERPL-SCNC: 109 MMOL/L (ref 98–107)
CO2 SERPL-SCNC: 30 MMOL/L (ref 21–32)
CREAT SERPL-MCNC: 0.9 MG/DL (ref 0.6–1)
DIFFERENTIAL METHOD BLD: ABNORMAL
EOSINOPHIL # BLD: 0.1 K/UL (ref 0–0.8)
EOSINOPHIL NFR BLD: 1 % (ref 0.5–7.8)
ERYTHROCYTE [DISTWIDTH] IN BLOOD BY AUTOMATED COUNT: 13.9 % (ref 11.9–14.6)
GLUCOSE SERPL-MCNC: 88 MG/DL (ref 65–100)
HCT VFR BLD AUTO: 28.3 % (ref 35.8–46.3)
HGB BLD-MCNC: 9.7 G/DL (ref 11.7–15.4)
IMM GRANULOCYTES # BLD: 0 K/UL (ref 0–0.5)
IMM GRANULOCYTES NFR BLD AUTO: 0.2 % (ref 0–5)
LYMPHOCYTES # BLD AUTO: 12 % (ref 13–44)
LYMPHOCYTES # BLD: 1.2 K/UL (ref 0.5–4.6)
MCH RBC QN AUTO: 32.1 PG (ref 26.1–32.9)
MCHC RBC AUTO-ENTMCNC: 34.3 G/DL (ref 31.4–35)
MCV RBC AUTO: 93.7 FL (ref 79.6–97.8)
MM INDURATION POC: 0 MM (ref 0–5)
MONOCYTES # BLD: 1.1 K/UL (ref 0.1–1.3)
MONOCYTES NFR BLD AUTO: 11 % (ref 4–12)
NEUTS SEG # BLD: 8.1 K/UL (ref 1.7–8.2)
NEUTS SEG NFR BLD AUTO: 76 % (ref 43–78)
PLATELET # BLD AUTO: 111 K/UL (ref 150–450)
PMV BLD AUTO: 9.9 FL (ref 10.8–14.1)
POTASSIUM SERPL-SCNC: 3.8 MMOL/L (ref 3.5–5.1)
PPD POC: NEGATIVE NEGATIVE
RBC # BLD AUTO: 3.02 M/UL (ref 4.05–5.25)
SODIUM SERPL-SCNC: 145 MMOL/L (ref 136–145)
WBC # BLD AUTO: 10.5 K/UL (ref 4.3–11.1)

## 2017-02-21 PROCEDURE — 36415 COLL VENOUS BLD VENIPUNCTURE: CPT | Performed by: NURSE PRACTITIONER

## 2017-02-21 PROCEDURE — 80048 BASIC METABOLIC PNL TOTAL CA: CPT | Performed by: NURSE PRACTITIONER

## 2017-02-21 PROCEDURE — 85025 COMPLETE CBC W/AUTO DIFF WBC: CPT | Performed by: NURSE PRACTITIONER

## 2017-02-21 PROCEDURE — 83880 ASSAY OF NATRIURETIC PEPTIDE: CPT | Performed by: INTERNAL MEDICINE

## 2017-02-21 PROCEDURE — 74011000258 HC RX REV CODE- 258: Performed by: INTERNAL MEDICINE

## 2017-02-21 PROCEDURE — 65270000029 HC RM PRIVATE

## 2017-02-21 PROCEDURE — 74011250636 HC RX REV CODE- 250/636: Performed by: NURSE PRACTITIONER

## 2017-02-21 PROCEDURE — 97530 THERAPEUTIC ACTIVITIES: CPT

## 2017-02-21 PROCEDURE — 74011250636 HC RX REV CODE- 250/636: Performed by: INTERNAL MEDICINE

## 2017-02-21 PROCEDURE — 74011250637 HC RX REV CODE- 250/637: Performed by: INTERNAL MEDICINE

## 2017-02-21 PROCEDURE — 74011250637 HC RX REV CODE- 250/637: Performed by: NURSE PRACTITIONER

## 2017-02-21 RX ORDER — FUROSEMIDE 10 MG/ML
20 INJECTION INTRAMUSCULAR; INTRAVENOUS ONCE
Status: DISCONTINUED | OUTPATIENT
Start: 2017-02-21 | End: 2017-02-21

## 2017-02-21 RX ORDER — FUROSEMIDE 10 MG/ML
40 INJECTION INTRAMUSCULAR; INTRAVENOUS 2 TIMES DAILY
Status: DISCONTINUED | OUTPATIENT
Start: 2017-02-21 | End: 2017-02-22

## 2017-02-21 RX ORDER — FUROSEMIDE 10 MG/ML
40 INJECTION INTRAMUSCULAR; INTRAVENOUS ONCE
Status: COMPLETED | OUTPATIENT
Start: 2017-02-21 | End: 2017-02-21

## 2017-02-21 RX ADMIN — CALCIUM CARBONATE 500 MG (1,250 MG)-VITAMIN D3 200 UNIT TABLET 1 TABLET: at 09:34

## 2017-02-21 RX ADMIN — LORAZEPAM 1 MG: 1 TABLET ORAL at 06:00

## 2017-02-21 RX ADMIN — CEFTRIAXONE 1 G: 1 INJECTION, POWDER, FOR SOLUTION INTRAMUSCULAR; INTRAVENOUS at 15:19

## 2017-02-21 RX ADMIN — FUROSEMIDE 40 MG: 10 INJECTION, SOLUTION INTRAMUSCULAR; INTRAVENOUS at 17:24

## 2017-02-21 RX ADMIN — Medication 5 ML: at 06:00

## 2017-02-21 RX ADMIN — ENOXAPARIN SODIUM 30 MG: 30 INJECTION SUBCUTANEOUS at 09:34

## 2017-02-21 RX ADMIN — DOCUSATE SODIUM 100 MG: 100 CAPSULE, LIQUID FILLED ORAL at 17:24

## 2017-02-21 RX ADMIN — Medication 5 ML: at 20:55

## 2017-02-21 RX ADMIN — DOCUSATE SODIUM 100 MG: 100 CAPSULE, LIQUID FILLED ORAL at 09:34

## 2017-02-21 RX ADMIN — CALCIUM CARBONATE 500 MG (1,250 MG)-VITAMIN D3 200 UNIT TABLET 1 TABLET: at 17:24

## 2017-02-21 RX ADMIN — CARVEDILOL 3.12 MG: 3.12 TABLET, FILM COATED ORAL at 17:24

## 2017-02-21 RX ADMIN — ACETAMINOPHEN 650 MG: 325 TABLET, FILM COATED ORAL at 20:54

## 2017-02-21 RX ADMIN — Medication 5 ML: at 15:21

## 2017-02-21 RX ADMIN — ACETAMINOPHEN 650 MG: 325 TABLET, FILM COATED ORAL at 15:19

## 2017-02-21 RX ADMIN — ACETAMINOPHEN 650 MG: 325 TABLET, FILM COATED ORAL at 06:00

## 2017-02-21 RX ADMIN — CALCIUM CARBONATE 500 MG (1,250 MG)-VITAMIN D3 200 UNIT TABLET 1 TABLET: at 12:05

## 2017-02-21 RX ADMIN — FUROSEMIDE 40 MG: 10 INJECTION, SOLUTION INTRAMUSCULAR; INTRAVENOUS at 09:34

## 2017-02-21 RX ADMIN — ATORVASTATIN CALCIUM 10 MG: 10 TABLET, FILM COATED ORAL at 09:34

## 2017-02-21 RX ADMIN — AMIODARONE HYDROCHLORIDE 100 MG: 200 TABLET ORAL at 09:34

## 2017-02-21 RX ADMIN — CARVEDILOL 3.12 MG: 3.12 TABLET, FILM COATED ORAL at 09:34

## 2017-02-21 NOTE — PROGRESS NOTES
AUDREY notified [facility] admissions that pt is not medically ready for discharge today. Bed remains available for the pt to transfer to them on Wednesday if she is cleared for discharge. AUDREY spoke with dtr, who was at the bedside, to provide update.

## 2017-02-21 NOTE — PROGRESS NOTES
Problem: Mobility Impaired (Adult and Pediatric)  Goal: *Acute Goals and Plan of Care (Insert Text)  STG:  (1.)Ms. Rachid Babcock will perform bed mobility with CONTACT GUARD ASSIST within 3 day(s). (2.)Ms. Rachid Babcock will transfer from bed to chair and chair to bed with MINIMAL ASSIST x1 using the least restrictive device within 3 day(s). (3.)Ms. Rachid Babcock will ambulate with MINIMAL ASSIST for 25+ feet with the least restrictive device within 3 day(s). (4.)Ms. Rachid Babcock will tolerate 15+ minutes of therapeutic activity/exercise while maintaining stable vitals to improve functional strength and mobility within 3 day(s). LTG:  (1.)Ms. Rachid Babcock will demonstrate fair+ dynamic standing balance utilizing least restrictive assistive device within 7 day(s). (2.)Ms. Rachid Babcock will transfer from bed to chair and chair to bed with CONTAC GUARD ASSIST using the least restrictive device within 7 day(s). (3.)Ms. Rachid Babcock will ambulate with MINIMAL ASSIST for 75+ feet with the least restrictive device within 7 day(s). (4.)Ms. Rachid Babcock will tolerate 25+ minutes of therapeutic activity/exercise while maintaining stable vitals to improve functional strength and mobility within 3 day(s).   ______________________________________________________________________________________________  PHYSICAL THERAPY: Daily Note, Treatment Day: 1st and AM    2/21/2017  INPATIENT: Hospital Day: 4  [place] / Product Type: Medicare /       DE WILLARD     NAME/AGE/GENDER: Steph Qiu is a 80 y.o. female           PRIMARY DIAGNOSIS: Right intertroch hip fracture  Hip fracture ([address] Closed intertrochanteric fracture of right femur ([address] Closed intertrochanteric fracture of right femur (HCC)  Procedure(s) (LRB):  FEMUR INSERTION INTRA MEDULLARY NAIL (Right)  2 Days Post-Op  ICD-10: Treatment Diagnosis:       · Generalized Muscle Weakness (M62.81)  · Difficulty in walking, Not elsewhere classified (R26.2)  · History of falling (Z91.81)   Precaution/Allergies:  Celebrex [celecoxib] and Other medication       ASSESSMENT:      Ms. Igor Mota presents is a 80year old female s/p R femoral IM nailing and is WBAT R LE. Patient was supine upon contact and very drowsy. Patient would briefly open her eyes but would fall right back asleep. Once mobility was initiated patient became very fidgety and difficult to cue to stay on task. Patient requires max-total assist for all mobility this morning due to drowsiness and is unable to participate fully with physical therapy due to current state. Attempted further treatment but patient was too fidgety to continue. No progress noted this treatment. Will continue efforts. This section established at most recent assessment   PROBLEM LIST (Impairments causing functional limitations):  1. Decreased Strength  2. Decreased ADL/Functional Activities  3. Decreased Transfer Abilities  4. Decreased Ambulation Ability/Technique  5. Decreased Balance  6. Increased Pain  7. Decreased Activity Tolerance  8. Decreased Knowledge of Precautions    INTERVENTIONS PLANNED: (Benefits and precautions of physical therapy have been discussed with the patient.)  1. Balance Exercise  2. Bed Mobility  3. Family Education  4. Gait Training  5. Home Exercise Program (HEP)  6. Range of Motion (ROM)  7. Therapeutic Activites  8. Therapeutic Exercise/Strengthening  9. Transfer Training  10. Group Therapy      TREATMENT PLAN: Frequency/Duration: twice daily for duration of hospital stay  Rehabilitation Potential For Stated Goals: GOOD      RECOMMENDED REHABILITATION/EQUIPMENT: (at time of discharge pending progress): Continue Skilled Therapy. HISTORY:   History of Present Injury/Illness (Reason for Referral):  S/p ORIF R femur; Difficulty in walking  Past Medical History/Comorbidities:   Ms. Igor Mota  has a past medical history of Anemia of other chronic disease (4/26/2014);  Anxiety; Arrhythmia; CAD (coronary artery disease) (20O7); Chronic systolic heart failure ([facility] Coagulation disorder ([facility] Congestive heart failure ([facility] (08/2007); Contusion of chest wall (4/29/2012); Coronary artery disease involving autologous vein coronary bypass graft without angina pectoris (6/5/2015); Falls (4/23/2014); Generalized OA (6/5/2015); Heart failure ([facility] Hip pain (4/26/2014); History of skin cancer (1992); [place] (hard of hearing); Hygroma (4/23/2014); Hyperglycemia (4/29/2012); Hyperlipidemia; Hypertension; Hypotension (4/23/2014); Mild dementia (12/3/2014); Nausea & vomiting; Neutrophilic leukocytosis (4/42/1960); Osteoarthritis; Osteoporosis; Osteoporosis (12/3/2014); Postmenopausal (12/3/2014); Postmenopausal bone loss; S/P cardiac pacemaker procedure (1/2011); S/P implantation of automatic cardioverter/defibrillator (AICD) (4/29/2012); S/P placement of cardiac pacemaker (4/29/2012); Shoulder fracture (1991); and Syncope (4/29/2012). Ms. Olive Corbett  has a past surgical history that includes biopsy of skin lesion (1992); bladder suspension (1995?); juan and bso (1984); tonsillectomy (as a child); heart catheterization (2007); shoulder arthroscopy (1991); pacemaker (1/13/2011); and ptca (08/2007). Social History/Living Environment:   Home Environment: Private residence  # Steps to Enter: 0  Wheelchair Ramp: Yes  One/Two Story Residence: One story  Living Alone: No  Support Systems: Family member(s)  Patient Expects to be Discharged to[de-identified] Rehabilitation facility  Current DME Used/Available at Home: Walker, rolling, Wheelchair  Prior Level of Function/Work/Activity:  Patient lives with her daughter/son-in-law in a single story residence with ramp to enter. At baseline, patient is independent with ADLs, ambulates with a RW, and endorses history of falls.     Number of Personal Factors/Comorbidities that affect the Plan of Care: 1-2: MODERATE COMPLEXITY   EXAMINATION:   Most Recent Physical Functioning:   Gross Assessment: Posture:     Balance:  Sitting: Impaired  Sitting - Static: Fair (occasional)  Sitting - Dynamic: Poor (constant support)  Standing: Impaired  Standing - Static: Poor  Standing - Dynamic : Poor Bed Mobility:  Supine to Sit: Maximum assistance; Total assistance;Assist x2  Sit to Supine:  (NT)  Wheelchair Mobility:     Transfers:  Sit to Stand: Maximum assistance;Assist x2  Stand to Sit: Maximum assistance; Total assistance;Assist x2  Bed to Chair: Maximum assistance; Total assistance;Assist x2  Gait:             Body Structures Involved:  1. Bones  2. Joints  3. Muscles  4. Ligaments Body Functions Affected:  1. Neuromusculoskeletal  2. Movement Related Activities and Participation Affected:  1. General Tasks and Demands  2. Mobility  3. Self Care  4. Domestic Life   Number of elements that affect the Plan of Care: 4+: HIGH COMPLEXITY   CLINICAL PRESENTATION:   Presentation: Evolving clinical presentation with changing clinical characteristics: MODERATE COMPLEXITY   CLINICAL DECISION MAKIN [facility] Inpatient Short Form  How much difficulty does the patient currently have. .. Unable A Lot A Little None   1. Turning over in bed (including adjusting bedclothes, sheets and blankets)? [ ] 1   [ ] 2   [X] 3   [ ] 4   2. Sitting down on and standing up from a chair with arms ( e.g., wheelchair, bedside commode, etc.)   [ ] 1   [ ] 2   [X] 3   [ ] 4   3. Moving from lying on back to sitting on the side of the bed? [ ] 1   [ ] 2   [X] 3   [ ] 4   How much help from another person does the patient currently need. .. Total A Lot A Little None   4. Moving to and from a bed to a chair (including a wheelchair)? [ ] 1   [X] 2   [ ] 3   [ ] 4   5. Need to walk in hospital room? [ ] 1   [X] 2   [ ] 3   [ ] 4   6. Climbing 3-5 steps with a railing? [ ] 1   [X] 2   [ ] 3   [ ] 4   © 2007, Trustees of [address] [place] [address] [address] [address], under license to [employer].  All rights reserved Score:  Initial: 15 Most Recent: 15 (Date: 2/20/17 )     Interpretation of Tool:  Represents activities that are increasingly more difficult (i.e. Bed mobility, Transfers, Gait). Score 24 23 22-20 19-15 14-10 9-7 6       Modifier CH CI CJ CK CL CM CN         · Mobility - Walking and Moving Around:               - CURRENT STATUS:    CK - 40%-59% impaired, limited or restricted               - GOAL STATUS:           CJ - 20%-39% impaired, limited or restricted               - D/C STATUS:                       ---------------To be determined---------------  [place] / Product Type: Medicare /       Medical Necessity:     · Patient demonstrates good rehab potential due to higher previous functional level. Reason for Services/Other Comments:  · Patient continues to require skilled intervention due to decreased functional mobility and balance/gait status from baseline. .   Use of outcome tool(s) and clinical judgement create a POC that gives a: Questionable prediction of patient's progress: MODERATE COMPLEXITY                 TREATMENT:       Pre-treatment Symptoms/Complaints:    Pain: Initial:   Pain Intensity 1: 0 (before and after treatment)  Post Session:  0/10        Therapeutic Activity: (    10 Minutes): Therapeutic activities including bed mobility training, transfer training, scooting, static sitting balance training, safety awareness training, posture training, and patient education to improve mobility, strength, balance and coordination. Required minimal verbal, tactile and manual cues   to promote static and dynamic balance in standing, promote coordination of right, lower extremity(s) and to stay on task/awake to be able to participate with physical therapy. Braces/Orthotics/Lines/Etc:   · None  Treatment/Session Assessment:    · Response to Treatment:  See above.   · Interdisciplinary Collaboration:  · Physical Therapy Assistant and Registered Nurse  · After treatment position/precautions:  · Up in chair, Bed alarm/tab alert on, Bed/Chair-wheels locked, Call light within reach, RN notified and Family at bedside  · Compliance with Program/Exercises: Will assess as treatment progresses. · Recommendations/Intent for next treatment session: \"Next visit will focus on advancements to more challenging activities and reduction in assistance provided\".      Total Treatment Duration:  PT Patient Time In/Time Out  Time In: 0846  Time Out: R Sheree [address] Adela, PTA

## 2017-02-21 NOTE — PROGRESS NOTES
Hospitalist Progress Note     Admit Date:  2017  7:26 PM   Name:  Marilia Garcia   Age:  80 y.o.  :  1928   MRN:  [id number]   PCP:  Dima Perez MD  Treatment Team: Attending Provider: Eliseo Chow MD; Utilization Review: Allyssa Ramirez RN; Staff Nurse: Radha Forrester RN    Subjective:   Patient is an 79 y/o F with sCHF who was admitted with hip fracture and UTI. operative repair  with some anemia due to operative blood loss.  - pt tired today. Hb dropped from 13.6 pre-op to 8.3 today from operative blood loss. No CP. Mildly SOB. No cough.  - pt confused today but responsive. Denies pain. Confusion limits ROS      Objective:     Patient Vitals for the past 24 hrs:   Temp Pulse Resp BP SpO2   17 0406 97.1 °F (36.2 °C) 76 17 142/65 92 %   17 2311 98.2 °F (36.8 °C) 82 20 (!) 144/99 100 %   17 - - - - 98 %   17 1933 98.3 °F (36.8 °C) 83 20 113/54 98 %   17 1530 98.5 °F (36.9 °C) 86 16 128/70 -   17 1430 98.2 °F (36.8 °C) 70 16 112/54 -   17 1359 98.2 °F (36.8 °C) 72 17 104/54 -   17 1259 97.4 °F (36.3 °C) 74 16 107/51 -   17 1240 98.1 °F (36.7 °C) 77 17 108/49 95 %   17 0805 97.4 °F (36.3 °C) 92 16 137/77 97 %     Oxygen Therapy  O2 Sat (%): 92 % (17)  Pulse via Oximetry: 66 beats per minute (17 1130)  O2 Device: Nasal cannula (17)  O2 Flow Rate (L/min): 4 l/min (decreased to 2) (02/20/17 2116)    Intake/Output Summary (Last 24 hours) at 17 0743  Last data filed at 17 1540   Gross per 24 hour   Intake              310 ml   Output                0 ml   Net              310 ml       General:    Alert. CV:   RRR. No murmur, rub, or gallop. Lungs:   Mild crackles at bases  Abdomen:   Soft, nontender, nondistended. Bowel sounds normal.   Extremities: Warm and dry. No cyanosis or edema. Skin:     No rashes or jaundice.      Current Meds:  Current Facility-Administered Medications   Medication Dose Route Frequency    furosemide (LASIX) injection 40 mg  40 mg IntraVENous ONCE    0.9% sodium chloride infusion 250 mL  250 mL IntraVENous PRN    carvedilol (COREG) tablet 3.125 mg  3.125 mg Oral BID WITH MEALS    LORazepam (ATIVAN) tablet 1 mg  1 mg Oral Q4H PRN    sodium chloride (NS) flush 5-10 mL  5-10 mL IntraVENous Q8H    sodium chloride (NS) flush 5-10 mL  5-10 mL IntraVENous PRN    acetaminophen (TYLENOL) tablet 650 mg  650 mg Oral Q8H    oxyCODONE IR (ROXICODONE) tablet 5 mg  5 mg Oral Q4H PRN    ondansetron (ZOFRAN) injection 4 mg  4 mg IntraVENous Q4H PRN    docusate sodium (COLACE) capsule 100 mg  100 mg Oral BID    alum-mag hydroxide-simeth (MYLANTA) oral suspension 30 mL  30 mL Oral Q4H PRN    calcium-vitamin D (OS-SUN) 500 mg-200 unit tablet  1 Tab Oral TID WITH MEALS    enoxaparin (LOVENOX) injection 30 mg  30 mg SubCUTAneous Q24H    cefTRIAXone (ROCEPHIN) 1 g in 0.9% sodium chloride (MBP/ADV) 50 mL  1 g IntraVENous Q24H    traMADol (ULTRAM) tablet 50 mg  50 mg Oral Q6H PRN    oxyCODONE IR (ROXICODONE) tablet 5 mg  5 mg Oral Q4H PRN    albuterol (PROVENTIL HFA, VENTOLIN HFA, PROAIR HFA) inhaler 1 Puff  1 Puff Inhalation Q4H PRN    amiodarone (CORDARONE) tablet 100 mg  100 mg Oral DAILY    atorvastatin (LIPITOR) tablet 10 mg  10 mg Oral DAILY       Labs and Studies:  I have reviewed all labs, meds, telemetry events, and studies from the last 24 hours. No results found for this or any previous visit (from the past 24 hour(s)). All Micro Results     Procedure Component Value Units Date/Time    MSSA/MRSA SC BY PCR, NASAL SWAB [[id number]] Collected:  02/18/17 3110    Order Status:  Completed Specimen:  Swab Updated:  02/19/17 8186     Special Requests: NO SPECIAL REQUESTS        Culture result:         SA target not detected.                                  A MRSA NEGATIVE, SA NEGATIVE test result does not preclude MRSA or SA nasal colonization. Recent Imaging:  CXR Results  (Last 48 hours)    None        CT Results  (Last 48 hours)    None          Assessment and Plan:     Hospital Problems as of 2/21/2017  Date Reviewed: 1/10/2017          Codes Class Noted - Resolved POA    Underweight ICD-10-CM: R63.6  ICD-9-CM: 783.22  2/21/2017 - Present Yes        Encephalopathy due to infection ICD-10-CM: G93.49, B99.9  ICD-9-CM: 348.39, 136.9  2/21/2017 - Present Yes        Acute cystitis without hematuria ICD-10-CM: N30.00  ICD-9-CM: 595.0  2/19/2017 - Present Yes        * (Principal)Closed intertrochanteric fracture of right femur ([address] ICD-10-CM: I40.746W  ICD-9-CM: 820.21  2/18/2017 - Present Yes        Chronic systolic congestive heart failure (HCC) (Chronic) ICD-10-CM: I50.22  ICD-9-CM: 428.22, 428.0  5/24/2016 - Present Yes        CAD (coronary artery disease) (Chronic) ICD-10-CM: I25.10  ICD-9-CM: 414.00  Unknown - Present Yes    Overview Signed 4/23/2014  4:10 PM by Nikole HENRIQUEZ, 2 STENT ,ARRHYTHMIA             S/P implantation of automatic cardioverter/defibrillator (AICD) (Chronic) ICD-10-CM: Z95.810  ICD-9-CM: V45.02  4/29/2012 - Present Yes                PLAN:    · AWAITING LABS TODAY  · Cont rocephin for UTI. Monitor CBC  · Hip fracture surgery done yesterday. Cont PT/OT. Resume home ASA/plavix at discharge unless ortho says otherwise  · CHF stable, caution with IVF. Cont home coreg. Might be slightly overloaded on exam today. Mild crackles at bases. Check bnp. Will give lasix. monitor  · Anemic post-op. Monitor. Transfuse if Hb < 8 per guidelines. Caution with CHF  · Other listed conditions stable, cont current    DC planning:  STR in a few days. Ppd done. SW consulted.   DVT ppx:  lovenox  Discussed plan with:  Pt and family    Signed:  Kayla Thibodeaux MD

## 2017-02-21 NOTE — PROGRESS NOTES
[address] FRACTURE PROGRESS NOTE    2017  Admit Date:   2017    Post Op day: 1 Day Post-Op    Subjective:    Margy Jennings PATIENT IN BED     PT/OT:   Gait:  Gait  Base of Support: Center of gravity altered  Speed/Divya: Shuffled, Slow  Step Length: Right shortened, Left shortened  Gait Abnormalities: Antalgic, Decreased step clearance, Shuffling gait, Step to gait, Trunk sway increased  Ambulation - Level of Assistance: Minimal assistance, Moderate assistance, Assist x1  Distance (ft): 5 Feet (ft)  Assistive Device: Walker, rolling  Interventions: Safety awareness training, Tactile cues, Verbal cues            Interventions: Safety awareness training, Tactile cues, Verbal cues    Vital Signs:    Patient Vitals for the past 8 hrs:   BP Temp Pulse Resp SpO2   17 1530 128/70 98.5 °F (36.9 °C) 86  -   17 1430 112/54 98.2 °F (36.8 °C) 70  -   17 1359 104/54 98.2 °F (36.8 °C) 72  -   17 1259 107/51 97.4 °F (36.3 °C) 74  -   17 1240 108/49 98.1 °F (36.7 °C) 77 17 95 %     Temp (24hrs), Av.8 °F (36.6 °C), Min:97.1 °F (36.2 °C), Max:98.5 °F (36.9 °C)      Pain Control:   Pain Assessment  Pain Scale 1: Numeric (0 - 10)  Pain Intensity 1: 0 (at rest; visually 4/10 with mobility)  Pain Onset 1: mobility; post op  Pain Location 1: Hip  Pain Orientation 1: Right  Pain Description 1: Sore  Pain Intervention(s) 1: Ambulation/Increased Activity, Emotional support, Position, Repositioned, Rest    Meds:    Current Facility-Administered Medications   Medication Dose Route Frequency    0.9% sodium chloride infusion 250 mL  250 mL IntraVENous PRN    carvedilol (COREG) tablet 3.125 mg  3.125 mg Oral BID WITH MEALS    furosemide (LASIX) tablet 20 mg  20 mg Oral DAILY    LORazepam (ATIVAN) tablet 1 mg  1 mg Oral Q4H PRN    sodium chloride (NS) flush 5-10 mL  5-10 mL IntraVENous Q8H    sodium chloride (NS) flush 5-10 mL  5-10 mL IntraVENous PRN    acetaminophen (TYLENOL) tablet 650 mg  650 mg Oral Q8H    oxyCODONE IR (ROXICODONE) tablet 5 mg  5 mg Oral Q4H PRN    ondansetron (ZOFRAN) injection 4 mg  4 mg IntraVENous Q4H PRN    docusate sodium (COLACE) capsule 100 mg  100 mg Oral BID    alum-mag hydroxide-simeth (MYLANTA) oral suspension 30 mL  30 mL Oral Q4H PRN    calcium-vitamin D (OS-SUN) 500 mg-200 unit tablet  1 Tab Oral TID WITH MEALS    enoxaparin (LOVENOX) injection 30 mg  30 mg SubCUTAneous Q24H    cefTRIAXone (ROCEPHIN) 1 g in 0.9% sodium chloride (MBP/ADV) 50 mL  1 g IntraVENous Q24H    traMADol (ULTRAM) tablet 50 mg  50 mg Oral Q6H PRN    oxyCODONE IR (ROXICODONE) tablet 5 mg  5 mg Oral Q4H PRN    albuterol (PROVENTIL HFA, VENTOLIN HFA, PROAIR HFA) inhaler 1 Puff  1 Puff Inhalation Q4H PRN    amiodarone (CORDARONE) tablet 100 mg  100 mg Oral DAILY    atorvastatin (LIPITOR) tablet 10 mg  10 mg Oral DAILY       LAB:    Recent Labs      02/20/17   0708  02/19/17   0017   HCT  24.9*   --    HGB  8.3*   --    INR   --   1.0       24 Hour Assessment Issues:    Oriented    Discharge Planning: SNF    Transfuse PRBC's:      Assessment & Physician's Comment:  Dressing is clean, dry, and intact  Neurovascular checks within normal limits    Principal Problem:    Closed intertrochanteric fracture of right femur ([address] (2/18/2017)    Active Problems:    S/P implantation of automatic cardioverter/defibrillator (AICD) (4/29/2012)      CAD (coronary artery disease) ()      Overview: MI, 2 STENT ,ARRHYTHMIA      Chronic systolic congestive heart failure ([address] (5/24/2016)      Acute cystitis without hematuria (2/19/2017)        Plan:  [facility] [place] TO SNF IN       Wood Sanchez MD

## 2017-02-21 NOTE — PROGRESS NOTES
Problem: Self Care Deficits Care Plan (Adult)  Goal: *Acute Goals and Plan of Care (Insert Text)  1. Patient will complete functional transfers with supervision while maintaining WBAT status in RLE and with adaptive equipment as needed. 2. Patient will complete lower body bathing and dressing with minimal assistance and adaptive equipment as needed. 3. Patient will complete toileting and toilet transfer with supervision. 4. Patient will tolerate 20 minutes of OT treatment with as needed rest breaks to increase activity tolerance for ADLs. 5. Patient will participate in at least 20 minutes of BUE therapeutic exercises to strengthen BUE for functional transfers. Timeframe: 7 visits       OCCUPATIONAL THERAPY: Daily Note, Treatment Day: 1st and PM    2/21/2017  INPATIENT: Hospital Day: 4  [place] / [place] / Product Type: Medicare /      NAME/AGE/GENDER: Mike Cantu is a 80 y.o. female           PRIMARY DIAGNOSIS:  Right intertroch hip fracture  Hip fracture ([address] Closed intertrochanteric fracture of right femur ([address] Closed intertrochanteric fracture of right femur (HCC)  Procedure(s) (LRB):  FEMUR INSERTION INTRA MEDULLARY NAIL (Right)  2 Days Post-Op  ICD-10: Treatment Diagnosis:        · Pain in Right Hip (M25.551)  · Stiffness of Right Hip, Not elsewhere classified (M25.651)  · History of falling (Z91.81)   Precautions/Allergies:        WBAT RLE    Celebrex [celecoxib] and Other medication       ASSESSMENT:      Ms. Jill Fournier is an 80year old female admitted after fall, now s/p above procedure and WBAT in RLE. Patient lives with her daughter and son in law at baseline. She is typically independent with ADLs, but does need help with shower transfers. She uses a rollator at all times for mobility and a wheelchair for longer community distances. She does not drive.  2/21/2017 Pt was presented up in the chair upon arrival. Pt completed sit to stand with moderate assistance x' 2 with a rolling walker and completed functional mobility to her bed with maximal assistance x's 2 with additional time and cues. Pt required maximal assistance x's 2 to return to supine. Pt with bed alarm on, call light within reach, and daughter at bedside. Pt participated with good effort and will continue to benefit from OT to increase progression toward above goals. This section established at most recent assessment   PROBLEM LIST (Impairments causing functional limitations):  1. Decreased Strength  2. Decreased ADL/Functional Activities  3. Decreased Transfer Abilities  4. Decreased Ambulation Ability/Technique  5. Decreased Balance  6. Increased Pain  7. Decreased Activity Tolerance  8. Decreased Flexibility/Joint Mobility  9. Decreased Knowledge of Precautions    INTERVENTIONS PLANNED: (Benefits and precautions of occupational therapy have been discussed with the patient.)  1. Activities of daily living training  2. Adaptive equipment training  3. Group therapy  4. Theraputic activity  5. Theraputic exercise      TREATMENT PLAN: Frequency/Duration: Follow patient 6x/ week to address above goals. Rehabilitation Potential For Stated Goals: FAIR      RECOMMENDED REHABILITATION/EQUIPMENT: (at time of discharge pending progress): Continue Skilled Therapy. OCCUPATIONAL PROFILE AND HISTORY:   History of Present Injury/Illness (Reason for Referral):  Per H&P, Leila Weaver is a 80 y.o.  female who presents with right hip pain after falling in the bathroom. She denies passing out and just lost her balance. She has hx of CHF with EF in the 20% range with ICD/Pacemaker. She currently denies any SOB, Orthopnea, DESOUZA, or chest pain. Her XR of right hip confirms a hip fracture. CXR is clear and without any signs of volume overload. \"  Past Medical History/Comorbidities:   Ms. Alessandra Oconnor  has a past medical history of Anemia of other chronic disease (4/26/2014);  Anxiety; Arrhythmia; CAD (coronary artery disease) (20O7); Chronic systolic heart failure ([facility] Coagulation disorder ([facility] Congestive heart failure ([facility] (08/2007); Contusion of chest wall (4/29/2012); Coronary artery disease involving autologous vein coronary bypass graft without angina pectoris (6/5/2015); Falls (4/23/2014); Generalized OA (6/5/2015); Heart failure ([facility] Hip pain (4/26/2014); History of skin cancer (1992); [place] (hard of hearing); Hygroma (4/23/2014); Hyperglycemia (4/29/2012); Hyperlipidemia; Hypertension; Hypotension (4/23/2014); Mild dementia (12/3/2014); Nausea & vomiting; Neutrophilic leukocytosis (0/49/2875); Osteoarthritis; Osteoporosis; Osteoporosis (12/3/2014); Postmenopausal (12/3/2014); Postmenopausal bone loss; S/P cardiac pacemaker procedure (1/2011); S/P implantation of automatic cardioverter/defibrillator (AICD) (4/29/2012); S/P placement of cardiac pacemaker (4/29/2012); Shoulder fracture (1991); and Syncope (4/29/2012). Ms. Maria Alejandra Sutton  has a past surgical history that includes biopsy of skin lesion (1992); bladder suspension (1995?); juan and bso (1984); tonsillectomy (as a child); heart catheterization (2007); shoulder arthroscopy (1991); pacemaker (1/13/2011); and ptca (08/2007). Social History/Living Environment:   Home Environment: Private residence  # Steps to Enter: 0  Wheelchair Ramp: Yes  One/Two Story Residence: One story  Living Alone: No  Support Systems: Family member(s)  Patient Expects to be Discharged to[de-identified] Rehabilitation facility  Current DME Used/Available at Home: Brynn Hoit, rolling, Wheelchair  Prior Level of Function/Work/Activity:  Patient lives with her daughter and son in law. She is independent with ADLs but gets assistance with shower transfers. She does not drive. She uses a rollator for mobility at all times. Uses a wheelchair for longer distances. Personal Factors:          Sex:  female        Age:  80 y.o.    Number of Personal Factors/Comorbidities that affect the Plan of Care: Extensive review of physical, cognitive, and psychosocial performance (3+):  HIGH COMPLEXITY   ASSESSMENT OF OCCUPATIONAL PERFORMANCE[de-identified]   Activities of Daily Living:           Basic ADLs (From Assessment) Complex ADLs (From Assessment)   Basic ADL  Feeding: Setup  Oral Facial Hygiene/Grooming: Minimum assistance  Bathing: Maximum assistance  Upper Body Dressing: Minimum assistance  Lower Body Dressing: Maximum assistance  Toileting: Maximum assistance Instrumental ADL  Meal Preparation: Total assistance  Homemaking: Total assistance  Medication Management: Total assistance  Financial Management: Total assistance   Grooming/Bathing/Dressing Activities of Daily Living                             Bed/Mat Mobility  Supine to Sit: Maximum assistance; Total assistance;Assist x2  Sit to Supine: Maximum assistance;Assist x2  Sit to Stand: Maximum assistance;Assist x2  Bed to Chair: Maximum assistance; Total assistance;Assist x2          Most Recent Physical Functioning:   Gross Assessment:                  Posture:  Posture (WDL): Exceptions to WDL  Posture Assessment: Kyphosis, Rounded shoulders  Balance:  Sitting: Impaired  Sitting - Static: Good (unsupported)  Sitting - Dynamic: Fair (occasional)  Standing: Impaired  Standing - Static: Constant support  Standing - Dynamic : Poor Bed Mobility:  Supine to Sit: Maximum assistance; Total assistance;Assist x2  Sit to Supine: Maximum assistance;Assist x2  Wheelchair Mobility:     Transfers:  Sit to Stand: Maximum assistance;Assist x2  Stand to Sit: Maximum assistance; Total assistance;Assist x2  Bed to Chair: Maximum assistance; Total assistance;Assist x2                    Patient Vitals for the past 6 hrs:   BP BP Patient Position SpO2 Pulse   02/21/17 1205 110/74 Sitting 92 % 77   02/21/17 1459 121/42 At rest - 71        Mental Status  Neurologic State: Alert, Confused  Orientation Level: Oriented to person, Disoriented to place, Disoriented to situation, Disoriented to time  Cognition: Decreased command following, Decreased attention/concentration  Perception: Appears intact  Perseveration: No perseveration noted  Safety/Judgement: Awareness of environment, Fall prevention                               Physical Skills Involved:  1. Range of Motion  2. Balance  3. Mobility  4. Strength  5. Endurance Cognitive Skills Affected (resulting in the inability to perform in a timely and safe manner):  1. Attending  2. Remembering Psychosocial Skills Affected:  1. Interpersonal Interactions  2. Routines and Behaviors  3. Environmental Adaptations   Number of elements that affect the Plan of Care: 5+:  HIGH COMPLEXITY   CLINICAL DECISION MAKIN [facility] Mobility Inpatient Short Form  How much help from another person does the patient currently need. .. Total A Lot A Little None   1. Putting on and taking off regular lower body clothing?   [ ] 1   [X] 2   [ ] 3   [ ] 4   2. Bathing (including washing, rinsing, drying)? [ ] 1   [X] 2   [ ] 3   [ ] 4   3. Toileting, which includes using toilet, bedpan or urinal?   [ ] 1   [X] 2   [ ] 3   [ ] 4   4. Putting on and taking off regular upper body clothing?   [ ] 1   [X] 2   [ ] 3   [ ] 4   5. Taking care of personal grooming such as brushing teeth? [ ] 1   [ ] 2   [X] 3   [ ] 4   6. Eating meals? [ ] 1   [ ] 2   [X] 3   [ ] 4   © , Trustees of [address] [place] [address], under license to [employer]. All rights reserved    Score:  Initial: 14 Most Recent: X (Date: -- )     Interpretation of Tool:  Represents activities that are increasingly more difficult (i.e. Bed mobility, Transfers, Gait).        Score 24 23 22-20 19-15 14-10 9-7 6       Modifier CH CI CJ CK CL CM CN         · Self Care:               - CURRENT STATUS:    CL - 60%-79% impaired, limited or restricted               - GOAL STATUS:           CK - 40%-59% impaired, limited or restricted               - D/C STATUS:                       ---------------To be determined---------------  [place] / [place] / Product Type: Medicare /       Medical Necessity:     · Patient demonstrates good rehab potential due to higher previous functional level. Reason for Services/Other Comments:  · Patient continues to require present interventions due to patient's inability to care for self at pre-op level of function. Use of outcome tool(s) and clinical judgement create a POC that gives a: MODERATE COMPLEXITY             TREATMENT:   (In addition to Assessment/Re-Assessment sessions the following treatments were rendered)      Pre-treatment Symptoms/Complaints:  None   Pain: Initial:   Pain Intensity 1: 0  Post Session:  same      Therapeutic Activity: (10 minutes): Therapeutic activities including Bed transfers, Chair transfers and static/dynamic standing  to improve mobility, strength, balance and activity tolerance. Required maximal assistance x's 2  to promote dynamic balance in standing. Braces/Orthotics/Lines/Etc:   · IV  · O2 Device: Nasal cannula  Treatment/Session Assessment:    · Response to Treatment:  Tolerated well  · Interdisciplinary Collaboration:  · Certified Occupational Therapy Assistant, Registered Nurse and Rehabilitation Attendant  · After treatment position/precautions:  · Supine in bed, Bed alarm/tab alert on, Bed/Chair-wheels locked, Bed in low position, Call light within reach, RN notified, Family at bedside and Side rails x 3  · Compliance with Program/Exercises: compliant all of the time. · Recommendations/Intent for next treatment session: \"Next visit will focus on advancements to more challenging activities and reduction in assistance provided\".   Total Treatment Duration:  OT Patient Time In/Time Out  Time In: 1450  Time Out: 48 Nicole Ferrari

## 2017-02-22 PROBLEM — B99.9 ENCEPHALOPATHY DUE TO INFECTION: Status: RESOLVED | Noted: 2017-02-21 | Resolved: 2017-02-22

## 2017-02-22 PROBLEM — G93.49 ENCEPHALOPATHY DUE TO INFECTION: Status: RESOLVED | Noted: 2017-02-21 | Resolved: 2017-02-22

## 2017-02-22 LAB
ABO + RH BLD: NORMAL
ANION GAP BLD CALC-SCNC: 8 MMOL/L (ref 7–16)
BLD PROD TYP BPU: NORMAL
BLD PROD TYP BPU: NORMAL
BLOOD GROUP ANTIBODIES SERPL: NORMAL
BPU ID: NORMAL
BPU ID: NORMAL
BUN SERPL-MCNC: 17 MG/DL (ref 8–23)
CALCIUM SERPL-MCNC: 9.1 MG/DL (ref 8.3–10.4)
CHLORIDE SERPL-SCNC: 106 MMOL/L (ref 98–107)
CO2 SERPL-SCNC: 31 MMOL/L (ref 21–32)
CREAT SERPL-MCNC: 0.98 MG/DL (ref 0.6–1)
CROSSMATCH RESULT,%XM: NORMAL
CROSSMATCH RESULT,%XM: NORMAL
GLUCOSE SERPL-MCNC: 89 MG/DL (ref 65–100)
POTASSIUM SERPL-SCNC: 3.5 MMOL/L (ref 3.5–5.1)
SODIUM SERPL-SCNC: 145 MMOL/L (ref 136–145)
SPECIMEN EXP DATE BLD: NORMAL
STATUS OF UNIT,%ST: NORMAL
STATUS OF UNIT,%ST: NORMAL
UNIT DIVISION, %UDIV: 0
UNIT DIVISION, %UDIV: 0

## 2017-02-22 PROCEDURE — 80048 BASIC METABOLIC PNL TOTAL CA: CPT | Performed by: INTERNAL MEDICINE

## 2017-02-22 PROCEDURE — 97116 GAIT TRAINING THERAPY: CPT

## 2017-02-22 PROCEDURE — 74011250637 HC RX REV CODE- 250/637: Performed by: INTERNAL MEDICINE

## 2017-02-22 PROCEDURE — 74011250636 HC RX REV CODE- 250/636: Performed by: NURSE PRACTITIONER

## 2017-02-22 PROCEDURE — 36415 COLL VENOUS BLD VENIPUNCTURE: CPT | Performed by: INTERNAL MEDICINE

## 2017-02-22 PROCEDURE — 74011250637 HC RX REV CODE- 250/637: Performed by: NURSE PRACTITIONER

## 2017-02-22 RX ORDER — LORAZEPAM 1 MG/1
1 TABLET ORAL
Qty: 14 TAB | Refills: 0 | Status: ON HOLD | OUTPATIENT
Start: 2017-02-22 | End: 2017-08-09

## 2017-02-22 RX ORDER — FERROUS SULFATE, DRIED 160(50) MG
1 TABLET, EXTENDED RELEASE ORAL
Qty: 90 TAB | Refills: 0 | Status: SHIPPED | OUTPATIENT
Start: 2017-02-22 | End: 2019-01-01

## 2017-02-22 RX ORDER — HYDROCODONE BITARTRATE AND ACETAMINOPHEN 5; 325 MG/1; MG/1
1 TABLET ORAL
Qty: 30 TAB | Refills: 0 | Status: SHIPPED | OUTPATIENT
Start: 2017-02-22 | End: 2017-05-01 | Stop reason: ALTCHOICE

## 2017-02-22 RX ORDER — CEFPODOXIME PROXETIL 200 MG/1
200 TABLET, FILM COATED ORAL 2 TIMES DAILY
Qty: 12 TAB | Refills: 0 | Status: SHIPPED
Start: 2017-02-22 | End: 2017-02-28

## 2017-02-22 RX ORDER — DOCUSATE SODIUM 100 MG/1
100 CAPSULE, LIQUID FILLED ORAL 2 TIMES DAILY
Qty: 60 CAP | Refills: 0 | Status: SHIPPED | OUTPATIENT
Start: 2017-02-22 | End: 2017-05-01 | Stop reason: SDUPTHER

## 2017-02-22 RX ORDER — ENOXAPARIN SODIUM 100 MG/ML
30 INJECTION SUBCUTANEOUS EVERY 24 HOURS
Qty: 7.5 ML | Refills: 0 | Status: SHIPPED
Start: 2017-02-22 | End: 2017-03-19

## 2017-02-22 RX ADMIN — DOCUSATE SODIUM 100 MG: 100 CAPSULE, LIQUID FILLED ORAL at 08:24

## 2017-02-22 RX ADMIN — ACETAMINOPHEN 650 MG: 325 TABLET, FILM COATED ORAL at 06:12

## 2017-02-22 RX ADMIN — Medication 10 ML: at 06:12

## 2017-02-22 RX ADMIN — ATORVASTATIN CALCIUM 10 MG: 10 TABLET, FILM COATED ORAL at 08:24

## 2017-02-22 RX ADMIN — AMIODARONE HYDROCHLORIDE 100 MG: 200 TABLET ORAL at 08:25

## 2017-02-22 RX ADMIN — ENOXAPARIN SODIUM 30 MG: 30 INJECTION SUBCUTANEOUS at 08:28

## 2017-02-22 RX ADMIN — CALCIUM CARBONATE 500 MG (1,250 MG)-VITAMIN D3 200 UNIT TABLET 1 TABLET: at 08:25

## 2017-02-22 RX ADMIN — CARVEDILOL 3.12 MG: 3.12 TABLET, FILM COATED ORAL at 08:25

## 2017-02-22 NOTE — DISCHARGE SUMMARY
Hospitalist Discharge Summary     Admit Date:  2017  7:26 PM   Name:  Yousif Almonte   Age:  80 y.o.  :  1928   MRN:  [id number]   PCP:  Guillermina Ravi MD  Treatment Team: Attending Provider: Robert Orlando MD; Utilization Review: Anabella Reyna RN; Care Manager: Krissy Leung LMSW    Problem List for this Hospitalization:  Hospital Problems as of 2017  Date Reviewed: 1/10/2017          Codes Class Noted - Resolved POA    Underweight ICD-10-CM: R63.6  ICD-9-CM: 783.22  2017 - Present Yes        Acute cystitis without hematuria ICD-10-CM: N30.00  ICD-9-CM: 595.0  2017 - Present Yes        * (Principal)Closed intertrochanteric fracture of right femur ([facility] ICD-10-CM: E10.593C  ICD-9-CM: 820.21  2017 - Present Yes        Chronic systolic congestive heart failure ([facility] (Chronic) ICD-10-CM: L52.79  ICD-9-CM: 428.22, 428.0  2016 - Present Yes        CAD (coronary artery disease) (Chronic) ICD-10-CM: I25.10  ICD-9-CM: 414.00  Unknown - Present Yes    Overview Signed 2014  4:10 PM by Apoorva Fernandez     MI, 2 STENT ,ARRHYTHMIA             S/P implantation of automatic cardioverter/defibrillator (AICD) (Chronic) ICD-10-CM: Z95.810  ICD-9-CM: V45.02  2012 - Present Yes        RESOLVED: Encephalopathy due to infection ICD-10-CM: G93.49, B99.9  ICD-9-CM: 348.39, 136.9  2017 - 2017 Yes                Admission HPI from 2017:    \" Yousif Alomnte is a 80 y.o.  female who presents with right hip pain after falling in the bathroom. She denies passing out and just lost her balance. She has hx of CHF with EF in the 20% range with ICD/Pacemaker. She currently denies any SOB, Orthopnea, DESOUZA, or chest pain. Her XR of right hip confirms a hip fracture. CXR is clear and without any signs of volume overload. \"    Hospital Course:  Patient is an 81 y/o F with sCHF who was admitted with hip fracture and UTI.  operative repair  with some anemia due to operative blood loss. This improved without blood transfusion. She had to stay a day longer than expected due to receiving IVF both pre and post op and this caused her to be mildly volume overloaded. She was given lasix 40mg IV BID for a day with prompt improvement in her oxygenation. At this point she is stable for discharge. She can be given lasix as needed for congestion, which is what she does at home. Follow up instructions below. Plan was discussed with pt. All questions answered. Patient was stable at time of discharge and was instructed to call or return if there are any concerns or recurrence of symptoms. Diagnostic Imaging/Tests:   XR FEMUR RT 2 VS (Final result) Result time: 02/19/17 10:32:24     Final result     Impression:     IMPRESSION: Internal fixation of right intertrochanteric hip fracture with  anatomic alignment.     Narrative:     Fluoroscopic images right femur    HISTORY: TFN. 6 fluoroscopic images of the proximal and distal portions of the right femur  were obtained. Comparison was made to conventional radiographs to 02/18/2017. FINDINGS: There has been interval placement of a trochanteric fixation nail  traversing the intertrochanteric hip fracture. Alignment is anatomic. No acute  periprosthetic fractures identified within the proximal and distal portions of  the femur. 1 minute and 42 seconds of fluoroscopy was used for this exam.                 XR HIP RT W OR WO PELV 2-3 VWS (Final result) Result time: 02/18/17 20:23:44     Final result     Impression:     IMPRESSION: Comminuted right intertrochanteric hip fracture. Right femur: AP and lateral views of the right femur were obtained. No prior  studies are available for comparison. Finding: No additional acute fracture or dislocation is present. There is  diffuse osteopenia. The soft tissues are unremarkable. IMPRESSION: No additional acute fracture identified.     Portable chest:    Comparison: 10/18/2015    Findings: A single view of the chest was obtained at 1956 hours. A dual-chamber  cardiac pacer device is present. The patient is slightly rotated towards the  right. The cardiac and mediastinal silhouette are normal in size and configuration. There is mild right basilar scarring. There are no pleural effusions. The  pulmonary vascularity is within normal limits. Deformity to the left humeral  neck suggest remote traumatic injury. Impression: No acute findings by plain film evaluation.       Narrative:     Right hip, right femur, portable chest    HISTORY: Right hip pain after fall. Right hip: 3 views of the right were obtained. Comparison was made to the prior  exam dated 04/26/2014. Ang Paige FINDINGS: There is a comminuted intertrochanteric hip fracture on the right. There is nearly 90 degrees of varus angulation of the dominant fracture  fragments. No additional acute fracture or dislocation is present. There is  diffuse osteopenia.                 XR FEMUR RT 2 VS (Final result) Result time: 02/18/17 20:23:44     Final result     Impression:     IMPRESSION: Comminuted right intertrochanteric hip fracture. Right femur: AP and lateral views of the right femur were obtained. No prior  studies are available for comparison. Finding: No additional acute fracture or dislocation is present. There is  diffuse osteopenia. The soft tissues are unremarkable. IMPRESSION: No additional acute fracture identified. Portable chest:    Comparison: 10/18/2015    Findings: A single view of the chest was obtained at 1956 hours. A dual-chamber  cardiac pacer device is present. The patient is slightly rotated towards the  right. The cardiac and mediastinal silhouette are normal in size and configuration. There is mild right basilar scarring. There are no pleural effusions. The  pulmonary vascularity is within normal limits.  Deformity to the left humeral  neck suggest remote traumatic injury. Impression: No acute findings by plain film evaluation.       Narrative:     Right hip, right femur, portable chest    HISTORY: Right hip pain after fall. Right hip: 3 views of the right were obtained. Comparison was made to the prior  exam dated 04/26/2014. Debo Ziegler FINDINGS: There is a comminuted intertrochanteric hip fracture on the right. There is nearly 90 degrees of varus angulation of the dominant fracture  fragments. No additional acute fracture or dislocation is present. There is  diffuse osteopenia.                 XR CHEST SNGL V (Final result) Result time: 02/18/17 20:23:44     Final result     Impression:     IMPRESSION: Comminuted right intertrochanteric hip fracture. Right femur: AP and lateral views of the right femur were obtained. No prior  studies are available for comparison. Finding: No additional acute fracture or dislocation is present. There is  diffuse osteopenia. The soft tissues are unremarkable. IMPRESSION: No additional acute fracture identified. Portable chest:    Comparison: 10/18/2015    Findings: A single view of the chest was obtained at 1956 hours. A dual-chamber  cardiac pacer device is present. The patient is slightly rotated towards the  right. The cardiac and mediastinal silhouette are normal in size and configuration. There is mild right basilar scarring. There are no pleural effusions. The  pulmonary vascularity is within normal limits. Deformity to the left humeral  neck suggest remote traumatic injury. Impression: No acute findings by plain film evaluation.       Narrative:     Right hip, right femur, portable chest    HISTORY: Right hip pain after fall. Right hip: 3 views of the right were obtained. Comparison was made to the prior  exam dated 04/26/2014. Debo Ziegler FINDINGS: There is a comminuted intertrochanteric hip fracture on the right. There is nearly 90 degrees of varus angulation of the dominant fracture  fragments.  No additional acute fracture or dislocation is present. There is  diffuse osteopenia. All Micro Results     Procedure Component Value Units Date/Time    MSSA/MRSA SC BY PCR, NASAL SWAB [[id number]] Collected:  02/18/17 2310    Order Status:  Completed Specimen:  Swab Updated:  02/19/17 0715     Special Requests: NO SPECIAL REQUESTS        Culture result:         SA target not detected. A MRSA NEGATIVE, SA NEGATIVE test result does not preclude MRSA or SA nasal colonization. Labs: Results:       BMP Recent Labs      02/21/17   0709   NA  145   K  3.8   CL  109*   CO2  30   AGAP  6*   BUN  15   CREA  0.90   CA  8.9   GLU  88      CBC Recent Labs      02/21/17   0709  02/20/17   0708   WBC  10.5  12.3*   RBC  3.02*  2.58*   HGB  9.7*  8.3*   HCT  28.3*  24.9*   PLT  111*  115*   GRANS  76  83*   LYMPH  12*  9*   EOS  1  0*   MONOS  11  8   BASOS  0  0   IG  0.2  0.3   ANEU  8.1  10.3*   ABL  1.2  1.1   REGULO  0.1  0.0   ABM  1.1  0.9   ABB  0.0  0.0   AIG  0.0  0.0      LFT No results for input(s): SGOT, ALT, TBIL, AP, TP, ALB, GLOB, AGRAT, GPT in the last 72 hours.    Cardiac Testing Lab Results   Component Value Date/Time     02/21/2017 07:09 AM     02/20/2017 07:08 AM    BNP 97 04/23/2014 12:20 PM    B-type Natriuretic Peptide 407.4 09/23/2014 09:06 AM    Troponin-I <0.05 04/28/2012 08:38 PM    Troponin-I <0.05 04/11/2012 12:03 PM    Troponin-I <0.05 11/07/2010 03:46 PM    Troponin-I, Qt. 0.05 11/08/2010 07:45 AM    Troponin-I, Qt. 0.05 11/08/2010 12:05 AM      Coagulation Tests Lab Results   Component Value Date/Time    Prothrombin time 11.1 02/19/2017 12:17 AM    Prothrombin time 10.2 07/09/2015 11:10 AM    Prothrombin time 10.9 04/23/2014 12:20 PM    INR 1.0 02/19/2017 12:17 AM    INR 1.0 07/09/2015 11:10 AM    INR 1.0 04/23/2014 12:20 PM    aPTT 22.9 02/19/2017 12:17 AM    aPTT 23.1 01/05/2011 12:34 PM      A1c No results found for: HBA1C, HGBE8, QEL0EULC, JSH0ORBM   Lipid Panel Lab Results   Component Value Date/Time    Cholesterol, total 167 01/10/2017 10:51 AM    HDL Cholesterol 77 01/10/2017 10:51 AM    LDL, calculated 74 01/10/2017 10:51 AM    VLDL, calculated 16 01/10/2017 10:51 AM    Triglyceride 82 01/10/2017 10:51 AM      Thyroid Panel Lab Results   Component Value Date/Time    T4, Total 10.2 03/31/2016 08:56 AM    T4, Total 9.8 04/01/2015 11:45 AM    TSH 3.880 03/31/2016 08:56 AM    TSH 1.980 04/01/2015 11:45 AM        Most Recent UA Lab Results   Component Value Date/Time    Color YELLOW 02/18/2017 08:40 PM    Appearance CLOUDY 02/18/2017 08:40 PM    Specific gravity 1.020 04/23/2014 08:55 PM    pH (UA) 6.0 02/18/2017 08:40 PM    Protein NEGATIVE  02/18/2017 08:40 PM    Glucose NEGATIVE  02/18/2017 08:40 PM    Ketone NEGATIVE  02/18/2017 08:40 PM    Bilirubin NEGATIVE  02/18/2017 08:40 PM    Blood NEGATIVE  02/18/2017 08:40 PM    Urobilinogen 0.2 02/18/2017 08:40 PM    Nitrites NEGATIVE  02/18/2017 08:40 PM    Leukocyte Esterase TRACE 02/18/2017 08:40 PM        Allergies   Allergen Reactions    Celebrex [Celecoxib] Other (comments)     Nausea and dyspepsia    Other Medication Hives and Itching     Unknown antibiotic in 1992     Immunization History   Administered Date(s) Administered    Influenza Vaccine 01/01/2013, 09/24/2014, 10/08/2015    Influenza Vaccine (Quad) PF 11/10/2016    Pneumococcal Vaccine (Unspecified Type) 01/01/2013    TB Skin Test (PPD) Intradermal 04/23/2014, 02/18/2017       All Labs from Last 24 Hrs:  Recent Results (from the past 24 hour(s))   PLEASE READ & DOCUMENT PPD TEST IN 72 HRS    Collection Time: 02/21/17 11:42 PM   Result Value Ref Range    PPD Negative Negative    mm Induration 0 mm       Discharge Exam:  Patient Vitals for the past 24 hrs:   Temp Pulse Resp BP SpO2   02/22/17 0724 98.5 °F (36.9 °C) 67 20 134/63 99 %   02/22/17 0521 98.6 °F (37 °C) 77 17 122/77 95 %   02/22/17 0007 98.2 °F (36.8 °C) 75 18 118/58 97 % 02/21/17 2120 98.1 °F (36.7 °C) 65 17 115/56 98 %   02/21/17 1459 98.3 °F (36.8 °C) 71 18 121/42 -   02/21/17 1205 97.3 °F (36.3 °C) 77 18 110/74 92 %     Oxygen Therapy  O2 Sat (%): 99 % (02/22/17 0724)  Pulse via Oximetry: 77 beats per minute (02/22/17 0521)  O2 Device: Room air (02/22/17 0521)  O2 Flow Rate (L/min): 2 l/min (02/21/17 2054)    Intake/Output Summary (Last 24 hours) at 02/22/17 0749  Last data filed at 02/21/17 1205   Gross per 24 hour   Intake                0 ml   Output              500 ml   Net             -500 ml       General:    Well nourished. Alert. No distress. Eyes:   Normal sclera. Extraocular movements intact. ENT:  Normocephalic, atraumatic. Moist mucous membranes  CV:   Regular rate and rhythm. No murmur, rub, or gallop. Lungs:  Clear to auscultation bilaterally. No wheezing, rhonchi, or rales. Abdomen: Soft, nontender, nondistended. Bowel sounds normal.   Extremities: Warm and dry. No cyanosis or edema. Neurologic: CN II-XII grossly intact. Sensation intact. Skin:     No rashes or jaundice. No wounds. Psych:  Normal mood and affect. Discharge Info:   Current Discharge Medication List      START taking these medications    Details   enoxaparin (LOVENOX) 30 mg/0.3 mL injection 0.3 mL by SubCUTAneous route every twenty-four (24) hours for 25 days. Indications: DEEP VEIN THROMBOSIS PREVENTION  Qty: 7.5 mL, Refills: 0      HYDROcodone-acetaminophen (NORCO) 5-325 mg per tablet Take 1 Tab by mouth every four (4) hours as needed for Pain. Max Daily Amount: 6 Tabs. Qty: 30 Tab, Refills: 0      docusate sodium (COLACE) 100 mg capsule Take 1 Cap by mouth two (2) times a day. Indications: prevent constipation  Qty: 60 Cap, Refills: 0      calcium-vitamin D (OYSTER SHELL) 500 mg(1,250mg) -200 unit per tablet Take 1 Tab by mouth three (3) times daily (with meals).   Qty: 90 Tab, Refills: 0      cefpodoxime (VANTIN) 200 mg tablet Take 1 Tab by mouth two (2) times a day for 6 days. Indications: BACTERIAL URINARY TRACT INFECTION  Qty: 12 Tab, Refills: 0         CONTINUE these medications which have NOT CHANGED    Details   diclofenac (VOLTAREN) 1 % gel Apply  to affected area four (4) times daily. Qty: 100 g, Refills: 3      LORazepam (ATIVAN) 1 mg tablet Take 1 Tab by mouth every twelve (12) hours every twelve (12) hours. Qty: 60 Tab, Refills: 1    Associated Diagnoses: MELANIA (generalized anxiety disorder)      clopidogrel (PLAVIX) 75 mg tablet Take 1 Tab by mouth daily. Qty: 30 Tab, Refills: 5      amiodarone (PACERONE) 100 mg tablet Take 1 Tab by mouth daily. Qty: 30 Tab, Refills: 5      atorvastatin (LIPITOR) 10 mg tablet Take 1 Tab by mouth daily. Qty: 30 Tab, Refills: 5      carvedilol (COREG) 3.125 mg tablet Take 1 Tab by mouth two (2) times daily (with meals). Qty: 60 Tab, Refills: 5    Associated Diagnoses: Chronic systolic heart failure (HCC)      albuterol (PROVENTIL HFA, VENTOLIN HFA, PROAIR HFA) 90 mcg/actuation inhaler Take 1 Puff by inhalation every four (4) hours as needed for Wheezing. Qty: 1 Inhaler, Refills: 1    Associated Diagnoses: Acute bronchitis, unspecified organism      nitroglycerin (NITROSTAT) 0.4 mg SL tablet 1 Tab by SubLINGual route every five (5) minutes as needed. Qty: 25 Tab, Refills: 3      furosemide (LASIX) 20 mg tablet One tab daily  Qty: 90 Tab, Refills: 2    Associated Diagnoses: Chronic systolic heart failure (HCC)      ERGOCALCIFEROL, VITAMIN D2, (VITAMIN D2 PO) Take  by mouth. aspirin (ASPIRIN) 325 mg tablet Take 81 mg by mouth every morning.  Hold until after surgery         STOP taking these medications       cholecalciferol (VITAMIN D3) 1,000 unit cap Comments:   Reason for Stopping:         CALCIUM PO Comments:   Reason for Stopping:                 Disposition: SNF  Activity: PT/OT Eval and Treat  Diet: Cardiac Diet    Follow-up Information     Follow up With Details Comments Contact Info    Aviva Farmer MD Schedule an appointment as soon as possible for a visit in 1 week [address] [place] [address]      Wood Sanchez MD On 3/6/2017 @ 1:20pm.  [address]  [address] [address] [address] [address]  [phone]              Time spent in patient discharge planning and coordination 35 minutes.     Signed:  Madison Foreman MD

## 2017-02-22 NOTE — PROGRESS NOTES
Problem: Mobility Impaired (Adult and Pediatric)  Goal: *Acute Goals and Plan of Care (Insert Text)  STG:  (1.)Ms. Dina Mitchell will perform bed mobility with CONTACT GUARD ASSIST within 3 day(s). (2.)Ms. Dina Mitchell will transfer from bed to chair and chair to bed with MINIMAL ASSIST x1 using the least restrictive device within 3 day(s). (3.)Ms. Dina Mitchell will ambulate with MINIMAL ASSIST for 25+ feet with the least restrictive device within 3 day(s). (4.)Ms. Dina Mitchell will tolerate 15+ minutes of therapeutic activity/exercise while maintaining stable vitals to improve functional strength and mobility within 3 day(s). LTG:  (1.)Ms. Dina Mitchell will demonstrate fair+ dynamic standing balance utilizing least restrictive assistive device within 7 day(s). (2.)Ms. Dina Mitchell will transfer from bed to chair and chair to bed with CONTAC GUARD ASSIST using the least restrictive device within 7 day(s). (3.)Ms. Dina Mitchell will ambulate with MINIMAL ASSIST for 75+ feet with the least restrictive device within 7 day(s). (4.)Ms. Dina Mitchell will tolerate 25+ minutes of therapeutic activity/exercise while maintaining stable vitals to improve functional strength and mobility within 3 day(s).   ______________________________________________________________________________________________  PHYSICAL THERAPY: Daily Note, Treatment Day: 2nd and AM    2/22/2017  INPATIENT: Hospital Day: 5  [place] / [place] / Product Type: Medicare /       WBAT SHANE WILLARD     NAME/AGE/GENDER: Javier Mcnulty is a 80 y.o. female           PRIMARY DIAGNOSIS: Right intertroch hip fracture  Hip fracture ([address] Closed intertrochanteric fracture of right femur ([address] Closed intertrochanteric fracture of right femur (HCC)  Procedure(s) (LRB):  FEMUR INSERTION INTRA MEDULLARY NAIL (Right)  3 Days Post-Op  ICD-10: Treatment Diagnosis:       · Generalized Muscle Weakness (M62.81)  · Difficulty in walking, Not elsewhere classified (R26.2)  · History of falling (Z91.81)   Precaution/Allergies:  Celebrex [celecoxib] and Other medication       ASSESSMENT:      Ms. Anatoliy Hernandez presents is a 80year old female s/p R femoral IM nailing and is WBAT R LE. Patient was supine upon contact and doing much better today. Patient easily awakens and is agreeable to PT. Patient able to perform supine to sit with mod assist, additional time, and cues for proper technique. Once seated EOB patient transfer to standing with min assist x 2 and perform gait training x 10' with use of rolling walker, min assist x 2, and below cues in gait section. Overall good progress. Will continue efforts. This section established at most recent assessment   PROBLEM LIST (Impairments causing functional limitations):  1. Decreased Strength  2. Decreased ADL/Functional Activities  3. Decreased Transfer Abilities  4. Decreased Ambulation Ability/Technique  5. Decreased Balance  6. Increased Pain  7. Decreased Activity Tolerance  8. Decreased Knowledge of Precautions    INTERVENTIONS PLANNED: (Benefits and precautions of physical therapy have been discussed with the patient.)  1. Balance Exercise  2. Bed Mobility  3. Family Education  4. Gait Training  5. Home Exercise Program (HEP)  6. Range of Motion (ROM)  7. Therapeutic Activites  8. Therapeutic Exercise/Strengthening  9. Transfer Training  10. Group Therapy      TREATMENT PLAN: Frequency/Duration: twice daily for duration of hospital stay  Rehabilitation Potential For Stated Goals: GOOD      RECOMMENDED REHABILITATION/EQUIPMENT: (at time of discharge pending progress): Continue Skilled Therapy. HISTORY:   History of Present Injury/Illness (Reason for Referral):  S/p ORIF R femur; Difficulty in walking  Past Medical History/Comorbidities:   Ms. Anatoliy Hernandez  has a past medical history of Anemia of other chronic disease (4/26/2014); Anxiety; Arrhythmia; CAD (coronary artery disease) (20O7); Chronic systolic heart failure ([facility] [address] [address]  Coagulation disorder ([facility] [address] Congestive heart failure ([facility] [address] (08/2007); Contusion of chest wall (4/29/2012); Coronary artery disease involving autologous vein coronary bypass graft without angina pectoris (6/5/2015); Falls (4/23/2014); Generalized OA (6/5/2015); Heart failure ([facility] [address] Hip pain (4/26/2014); History of skin cancer (1992); [place] (hard of hearing); Hygroma (4/23/2014); Hyperglycemia (4/29/2012); Hyperlipidemia; Hypertension; Hypotension (4/23/2014); Mild dementia (12/3/2014); Nausea & vomiting; Neutrophilic leukocytosis (8/71/5675); Osteoarthritis; Osteoporosis; Osteoporosis (12/3/2014); Postmenopausal (12/3/2014); Postmenopausal bone loss; S/P cardiac pacemaker procedure (1/2011); S/P implantation of automatic cardioverter/defibrillator (AICD) (4/29/2012); S/P placement of cardiac pacemaker (4/29/2012); Shoulder fracture (1991); and Syncope (4/29/2012). Ms. Yola Lemons  has a past surgical history that includes biopsy of skin lesion (1992); bladder suspension (1995?); juan and bso (1984); tonsillectomy (as a child); heart catheterization (2007); shoulder arthroscopy (1991); pacemaker (1/13/2011); and ptca (08/2007). Social History/Living Environment:   Home Environment: Private residence  # Steps to Enter: 0  Wheelchair Ramp: Yes  One/Two Story Residence: One story  Living Alone: No  Support Systems: Family member(s)  Patient Expects to be Discharged to[de-identified] Rehabilitation facility  Current DME Used/Available at Home: Walker, rolling, Wheelchair  Prior Level of Function/Work/Activity:  Patient lives with her daughter/son-in-law in a single story residence with ramp to enter. At baseline, patient is independent with ADLs, ambulates with a RW, and endorses history of falls.     Number of Personal Factors/Comorbidities that affect the Plan of Care: 1-2: MODERATE COMPLEXITY   EXAMINATION:   Most Recent Physical Functioning:   Gross Assessment:                  Posture:     Balance:  Sitting: Impaired  Sitting - Static: Good (unsupported)  Sitting - Dynamic: Fair (occasional)  Standing: Impaired  Standing - Static: Constant support;Poor  Standing - Dynamic : Poor Bed Mobility:  Supine to Sit: Moderate assistance; Additional time  Sit to Supine:  (NT)  Wheelchair Mobility:     Transfers:  Sit to Stand: Minimum assistance;Assist x2  Stand to Sit: Minimum assistance;Assist x2  Bed to Chair: Minimum assistance;Assist x2  Gait:     Base of Support: Center of gravity altered;Narrowed  Speed/Divya: Shuffled; Slow  Step Length: Right shortened;Left shortened  Gait Abnormalities: Decreased step clearance;Trunk sway increased; Shuffling gait; Step to gait; Path deviations  Distance (ft): 10 Feet (ft)  Assistive Device: Walker, rolling  Ambulation - Level of Assistance: Minimal assistance;Assist x2  Interventions: Safety awareness training; Tactile cues; Verbal cues       Body Structures Involved:  1. Bones  2. Joints  3. Muscles  4. Ligaments Body Functions Affected:  1. Neuromusculoskeletal  2. Movement Related Activities and Participation Affected:  1. General Tasks and Demands  2. Mobility  3. Self Care  4. Domestic Life   Number of elements that affect the Plan of Care: 4+: HIGH COMPLEXITY   CLINICAL PRESENTATION:   Presentation: Evolving clinical presentation with changing clinical characteristics: MODERATE COMPLEXITY   CLINICAL DECISION MAKIN [facility] Mobility Inpatient Short Form  How much difficulty does the patient currently have. .. Unable A Lot A Little None   1. Turning over in bed (including adjusting bedclothes, sheets and blankets)? [ ] 1   [ ] 2   [X] 3   [ ] 4   2. Sitting down on and standing up from a chair with arms ( e.g., wheelchair, bedside commode, etc.)   [ ] 1   [ ] 2   [X] 3   [ ] 4   3. Moving from lying on back to sitting on the side of the bed? [ ] 1   [ ] 2   [X] 3   [ ] 4   How much help from another person does the patient currently need. .. Total A Lot A Little None   4. Moving to and from a bed to a chair (including a wheelchair)? [ ] 1   [X] 2   [ ] 3   [ ] 4   5. Need to walk in hospital room? [ ] 1   [X] 2   [ ] 3   [ ] 4   6. Climbing 3-5 steps with a railing? [ ] 1   [X] 2   [ ] 3   [ ] 4   © 2007, Trustees of [address] [place] [address] [address] [address], under license to [employer]. All rights reserved    Score:  Initial: 15 Most Recent: 15 (Date: 2/20/17 )     Interpretation of Tool:  Represents activities that are increasingly more difficult (i.e. Bed mobility, Transfers, Gait). Score 24 23 22-20 19-15 14-10 9-7 6       Modifier CH CI CJ CK CL CM CN         · Mobility - Walking and Moving Around:               - CURRENT STATUS:    CK - 40%-59% impaired, limited or restricted               - GOAL STATUS:           CJ - 20%-39% impaired, limited or restricted               - D/C STATUS:                       ---------------To be determined---------------  [place] / [place] / Product Type: Medicare /       Medical Necessity:     · Patient demonstrates good rehab potential due to higher previous functional level. Reason for Services/Other Comments:  · Patient continues to require skilled intervention due to decreased functional mobility and balance/gait status from baseline. .   Use of outcome tool(s) and clinical judgement create a POC that gives a: Questionable prediction of patient's progress: MODERATE COMPLEXITY                 TREATMENT:       Pre-treatment Symptoms/Complaints:    Pain: Initial:   Pain Intensity 1: 0 (before and after treatment)  Post Session:  0/10      Gait Training (  10 Minutes):  Gait training to improve and/or restore physical functioning as related to mobility, strength and balance. Ambulated 10 Feet (ft) with Minimal assistance;Assist x2 using a Walker, rolling and moderate Safety awareness training; Tactile cues; Verbal cues related to their sequencing, walker manipulation, posture, step length, foot placement, ELLYN, UE support on rolling walker and weight shifts to promote proper body alignment, promote proper body posture and promote proper body mechanics. Instruction in performance of the above deficits to correct overall gait quality and functional mobility. Therapeutic Activity: (     Minutes): Therapeutic activities including bed mobility training, transfer training, scooting, static sitting balance training, safety awareness training, posture training, and patient education to improve mobility, strength, balance and coordination. Required minimal verbal, tactile and manual cues Safety awareness training; Tactile cues; Verbal cues to promote static and dynamic balance in standing, promote coordination of right, lower extremity(s) and to stay on task/awake to be able to participate with physical therapy. Braces/Orthotics/Lines/Etc:   · None  Treatment/Session Assessment:    · Response to Treatment:  See above. · Interdisciplinary Collaboration:  · Physical Therapy Assistant and Registered Nurse  · After treatment position/precautions:  · Up in chair, Bed alarm/tab alert on, Call light within reach, RN notified and Family at bedside  · Compliance with Program/Exercises: Will assess as treatment progresses. · Recommendations/Intent for next treatment session: \"Next visit will focus on advancements to more challenging activities and reduction in assistance provided\".      Total Treatment Duration:  PT Patient Time In/Time Out  Time In: 0846  Time Out: [address] Reece Chapman, LARISA

## 2017-02-22 NOTE — PROGRESS NOTES
[address] FRACTURE PROGRESS NOTE    2017  Admit Date:   2017    Post Op day: 2 Days Post-Op    Subjective:    Luvenia Crooked IN BED; NO COMPLAINTS     PT/OT:   Gait:  Gait  Base of Support: Center of gravity altered  Speed/Divya: Shuffled, Slow  Step Length: Right shortened, Left shortened  Gait Abnormalities: Antalgic, Decreased step clearance, Shuffling gait, Step to gait, Trunk sway increased  Ambulation - Level of Assistance: Minimal assistance, Moderate assistance, Assist x1  Distance (ft): 5 Feet (ft)  Assistive Device: Walker, rolling  Interventions: Safety awareness training, Tactile cues, Verbal cues            Interventions: Safety awareness training, Tactile cues, Verbal cues    Vital Signs:    Patient Vitals for the past 8 hrs:   BP Temp Pulse Resp SpO2   17 1459 121/42 98.3 °F (36.8 °C) 71 18 -   17 1205 110/74 97.3 °F (36.3 °C) 77 18 92 %     Temp (24hrs), Av.9 °F (36.6 °C), Min:97.1 °F (36.2 °C), Max:98.3 °F (36.8 °C)      Pain Control:   Pain Assessment  Pain Scale 1: Visual  Pain Intensity 1: 0  Pain Onset 1: mobility; post op  Pain Location 1: Hip  Pain Orientation 1: Right  Pain Description 1: Sore  Pain Intervention(s) 1: Ambulation/Increased Activity, Emotional support, Position, Repositioned, Rest    Meds:    Current Facility-Administered Medications   Medication Dose Route Frequency    furosemide (LASIX) injection 40 mg  40 mg IntraVENous BID    0.9% sodium chloride infusion 250 mL  250 mL IntraVENous PRN    carvedilol (COREG) tablet 3.125 mg  3.125 mg Oral BID WITH MEALS    LORazepam (ATIVAN) tablet 1 mg  1 mg Oral Q4H PRN    sodium chloride (NS) flush 5-10 mL  5-10 mL IntraVENous Q8H    sodium chloride (NS) flush 5-10 mL  5-10 mL IntraVENous PRN    acetaminophen (TYLENOL) tablet 650 mg  650 mg Oral Q8H    oxyCODONE IR (ROXICODONE) tablet 5 mg  5 mg Oral Q4H PRN    ondansetron (ZOFRAN) injection 4 mg  4 mg IntraVENous Q4H PRN    docusate sodium (COLACE) capsule 100 mg  100 mg Oral BID    alum-mag hydroxide-simeth (MYLANTA) oral suspension 30 mL  30 mL Oral Q4H PRN    calcium-vitamin D (OS-SUN) 500 mg-200 unit tablet  1 Tab Oral TID WITH MEALS    enoxaparin (LOVENOX) injection 30 mg  30 mg SubCUTAneous Q24H    cefTRIAXone (ROCEPHIN) 1 g in 0.9% sodium chloride (MBP/ADV) 50 mL  1 g IntraVENous Q24H    traMADol (ULTRAM) tablet 50 mg  50 mg Oral Q6H PRN    oxyCODONE IR (ROXICODONE) tablet 5 mg  5 mg Oral Q4H PRN    albuterol (PROVENTIL HFA, VENTOLIN HFA, PROAIR HFA) inhaler 1 Puff  1 Puff Inhalation Q4H PRN    amiodarone (CORDARONE) tablet 100 mg  100 mg Oral DAILY    atorvastatin (LIPITOR) tablet 10 mg  10 mg Oral DAILY       LAB:    Recent Labs      02/21/17   0709   02/19/17   0017   HCT  28.3*   < >   --    HGB  9.7*   < >   --    INR   --    --   1.0    < > = values in this interval not displayed.        24 Hour Assessment Issues:    Oriented    Discharge Planning: SNF    Transfuse PRBC's:      Assessment & Physician's Comment:  Dressing is clean, dry, and intact  Neurovascular checks within normal limits    Principal Problem:    Closed intertrochanteric fracture of right femur ([address] (2/18/2017)    Active Problems:    S/P implantation of automatic cardioverter/defibrillator (AICD) (4/29/2012)      CAD (coronary artery disease) ()      Overview: MI, 2 STENT ,ARRHYTHMIA      Chronic systolic congestive heart failure ([address] (5/24/2016)      Acute cystitis without hematuria (2/19/2017)      Underweight (2/21/2017)      Encephalopathy due to infection (2/21/2017)        Plan:  CONTINUE THERAPY   WBAT   SNF FOR REHAB      Justa Spann NP

## 2017-02-22 NOTE — PROGRESS NOTES
TRANSFER - OUT REPORT:    Verbal report given to Paulo(name) on Radhika Zimmerman  being transferred to [facility]) for routine progression of care       Report consisted of patients Situation, Background, Assessment and   Recommendations(SBAR). Information from the following report(s) SBAR, Kardex, Intake/Output, MAR and Recent Results was reviewed with the receiving nurse. Lines:       Opportunity for questions and clarification was provided.       Patient transported with:   [employer]

## 2017-02-23 ENCOUNTER — PATIENT OUTREACH (OUTPATIENT)
Dept: CASE MANAGEMENT | Age: 82
End: 2017-02-23

## 2017-02-23 VITALS
DIASTOLIC BLOOD PRESSURE: 63 MMHG | SYSTOLIC BLOOD PRESSURE: 134 MMHG | RESPIRATION RATE: 20 BRPM | OXYGEN SATURATION: 99 % | HEIGHT: 62 IN | TEMPERATURE: 109 F | WEIGHT: 98.1 LBS | HEART RATE: 67 BPM | BODY MASS INDEX: 18.05 KG/M2

## 2017-02-23 NOTE — PROGRESS NOTES
Patient discharged to SNF. Care coordinator will f/u with patient in 21 days for [facility] outreach. No further needs at current time. This note will not be viewable in [address]

## 2017-04-02 ENCOUNTER — PATIENT OUTREACH (OUTPATIENT)
Dept: CASE MANAGEMENT | Age: 82
End: 2017-04-02

## 2017-04-02 NOTE — PROGRESS NOTES
Care coordinator spoke to patient's daughter who indicated patient was currently admitted to rehab and does not have a definitive discharge date. Patient was expected to be discharged this past week but physicians decided patient was not ready. Daughter verbalizes patient is improving with rehab and \"doing a lot better. \" This note will not be viewable in [address]

## 2017-05-01 PROBLEM — M81.0 AGE RELATED OSTEOPOROSIS: Status: ACTIVE | Noted: 2017-05-01

## 2017-05-18 ENCOUNTER — HOSPITAL ENCOUNTER (OUTPATIENT)
Dept: PHYSICAL THERAPY | Age: 82
Discharge: HOME | End: 2017-05-18
Payer: MEDICARE

## 2017-05-23 ENCOUNTER — HOSPITAL ENCOUNTER (OUTPATIENT)
Dept: PHYSICAL THERAPY | Age: 82
Discharge: HOME | End: 2017-05-23
Payer: MEDICARE

## 2017-05-23 PROCEDURE — G8979 MOBILITY GOAL STATUS: HCPCS | Performed by: PHYSICAL THERAPIST

## 2017-05-23 PROCEDURE — 97110 THERAPEUTIC EXERCISES: CPT | Performed by: PHYSICAL THERAPIST

## 2017-05-23 PROCEDURE — 97162 PT EVAL MOD COMPLEX 30 MIN: CPT | Performed by: PHYSICAL THERAPIST

## 2017-05-23 PROCEDURE — G8978 MOBILITY CURRENT STATUS: HCPCS | Performed by: PHYSICAL THERAPIST

## 2017-05-23 NOTE — PROGRESS NOTES
Andrez Left  : [id number] [facility] [address]  [address] [place] [address].  [phone]   [phone]        OUTPATIENT PHYSICAL THERAPY:Initial Assessment 2017      ICD-10: Treatment Diagnosis: Pain in right hip (M25.551)  Difficulty in walking, not elsewhere classified (R26.2)  Precautions/Allergies:   Celebrex [celecoxib] and Other medication   Fall Risk Score: 6 (? 5 = High Risk)  MD Orders: s/p Right IT Fracture: Strengthen Hip abductors and quad, gait training WBAT MEDICAL/REFERRING DIAGNOSIS:  Hip fracture, right ([facility] [address] [S72.001A]   DATE OF ONSET: surgery 17  REFERRING PHYSICIAN: Vladislav Cadena MD  RETURN PHYSICIAN APPOINTMENT: 2017 pt did not recall the date at eval     INITIAL ASSESSMENT:  Ms. Gianna Sarmiento presents with R hip pain and difficulty walking s/p ORIF for IT fracture. Pt has very poor balance and requires rolling walker for gait. Pt has decreased LE strength weaker with R hip and decreased ROM with R hip and knee. Pt will benefit from PT for strengthening, ROM, balance and gait training. Pt has increased difficulty with transfers and requires some assist with ADLs. Pt will benefit from beginning in aquatic environment for ease of movement and safety with challenging pt's balance and then will progress towards further land based strengthening, balance, and gait training. PROBLEM LIST (Impacting functional limitations):  1. Decreased Strength  2. Decreased ADL/Functional Activities  3. Decreased Transfer Abilities  4. Decreased Ambulation Ability/Technique  5. Decreased Balance  6. Increased Pain  7. Decreased Activity Tolerance  8. Decreased Flexibility/Joint Mobility  9. Decreased [place] with Home Exercise Program INTERVENTIONS PLANNED:  1. Balance Exercise  2. Cold  3. Gait Training  4. Heat  5. Home Exercise Program (HEP)  6. Manual Therapy  7. Neuromuscular Re-education/Strengthening  8.  Range of Motion (ROM)  9. Therapeutic Activites  10. Therapeutic Exercise/Strengthening  11. Transfer Training  12. aquatics   TREATMENT PLAN:  Effective Dates: 5/23/17 TO 8/15/17. Frequency/Duration: 2-3 times a week for 12 weeks  GOALS: (Goals have been discussed and agreed upon with patient.)  Short-Term Functional Goals: Time Frame: 4 weeks  1. Pt will independent with HEP. 2. Pt will increase R hip at least 10 ° in each plane for increased mobility with dressing. 3. Pt will be able to participate in aquatic PT for at least 45 minutes without increased symptoms to improve mobility, gait, and strength. 4. Pt will be able to stand and balance for at least 30 seconds without her walker. 5. Pt will be able to safely navigate up/down a curb with walker with SBA. Discharge Goals: Time Frame: 12 weeks  1. Pt will be able to ambulate at least 600 ft with rolling walker safely. 2. Pt will be able to navigate up/down at least 4 stairs with railing safely. 3. Pt will be able to increase BLE strength at least 4/5 or greater for increased functional strength for squatting, gait, and stairs. 4. Pt will be able to  romberg stance for at least 30 seconds noting increased balance. 5. Pt will be able to reach overhead with both UEs while standing without UE support as if reaching into a cabinet without loss of balance. Rehabilitation Potential For Stated Goals: Excellent  Regarding Harshad Stallworth's therapy, I certify that the treatment plan above will be carried out by a therapist or under their direction. Thank you for this referral,  Nahomi Gallegos, PT       Referring Physician Signature: Regi Beach MD              Date                      HISTORY:   History of Present Injury/Illness (Reason for Referral):  Pt reports falling for unknown reason in her bathroom Sat Feb 18 and causing R hip IT fracture. Pt underwent ORIF Sun Feb 20, 2017.  Pt was discharged to the [facility] around the 2/23/17 and went home around mid April. Pt then had home health PT and was discharged to come to outpatient. Pt would like to be able to regain balance and strength to walk better. Pt states she is walking around her home but coming into PT today was the largest distance she has walked out of her home. Past Medical History/Comorbidities:   Ms. Nato Fierro  has a past medical history of Anemia of other chronic disease (4/26/2014); Anxiety; Arrhythmia; CAD (coronary artery disease) (20O7); Chronic systolic heart failure ([address] Coagulation disorder ([facility] [address] Congestive heart failure ([address] (08/2007); Contusion of chest wall (4/29/2012); Coronary artery disease involving autologous vein coronary bypass graft without angina pectoris (6/5/2015); Falls (4/23/2014); Generalized OA (6/5/2015); Heart failure ([facility] [address] Hip pain (4/26/2014); History of skin cancer (1992); [place] (hard of hearing); Hygroma (4/23/2014); Hyperglycemia (4/29/2012); Hyperlipidemia; Hypertension; Hypotension (4/23/2014); Mild dementia (12/3/2014); Nausea & vomiting; Neutrophilic leukocytosis (4/77/2900); Osteoarthritis; Osteoporosis; Osteoporosis (12/3/2014); Postmenopausal (12/3/2014); Postmenopausal bone loss; S/P cardiac pacemaker procedure (1/2011); S/P implantation of automatic cardioverter/defibrillator (AICD) (4/29/2012); S/P placement of cardiac pacemaker (4/29/2012); Shoulder fracture (1991); and Syncope (4/29/2012). Ms. Nato Fierro  has a past surgical history that includes biopsy of skin lesion (1992); bladder suspension (1995?); juan and bso (1984); tonsillectomy (as a child); heart catheterization (2007); shoulder arthroscopy (1991); pacemaker (1/13/2011); and ptca (08/2007).   Social History/Living Environment:    lives with daughter in one story home with ramp  Prior Level of Function/Work/Activity:  Independent with ADLs, Used walker intermittently prior to fall; had assist of daughter for cooking and cleaning  Current Medications:    Current Outpatient Prescriptions:    gabapentin (NEURONTIN) 100 mg capsule, TAKE ONE CAPSULE BY MOUTH AT BEDTIME, Disp: , Rfl: 0    nitroglycerin (NITROSTAT) 0.4 mg SL tablet, 1 Tab by SubLINGual route every five (5) minutes as needed. , Disp: 25 Tab, Rfl: 3    furosemide (LASIX) 20 mg tablet, One tab daily as needed, Disp: 90 Tab, Rfl: 2    carvedilol (COREG) 3.125 mg tablet, Take 1 Tab by mouth two (2) times daily (with meals). , Disp: 60 Tab, Rfl: 5    docusate sodium (COLACE) 100 mg capsule, Take 1 Cap by mouth two (2) times a day. Indications: prevent constipation, Disp: 60 Cap, Rfl: 0    atorvastatin (LIPITOR) 10 mg tablet, TAKE ONE TABLET BY MOUTH ONE TIME DAILY, Disp: 30 Tab, Rfl: 5    amiodarone (CORDARONE) 200 mg tablet, TAKE ONE-HALF TABLET BY MOUTH ONE TIME DAILY, Disp: 15 Tab, Rfl: 5    clopidogrel (PLAVIX) 75 mg tab, TAKE ONE TABLET BY MOUTH ONE TIME DAILY, Disp: 30 Tab, Rfl: 5    calcium-vitamin D (OYSTER SHELL) 500 mg(1,250mg) -200 unit per tablet, Take 1 Tab by mouth three (3) times daily (with meals). , Disp: 90 Tab, Rfl: 0    LORazepam (ATIVAN) 1 mg tablet, Take 1 Tab by mouth every twelve (12) hours as needed for Anxiety. Max Daily Amount: 2 mg., Disp: 14 Tab, Rfl: 0    diclofenac (VOLTAREN) 1 % gel, Apply  to affected area four (4) times daily. , Disp: 100 g, Rfl: 3    albuterol (PROVENTIL HFA, VENTOLIN HFA, PROAIR HFA) 90 mcg/actuation inhaler, Take 1 Puff by inhalation every four (4) hours as needed for Wheezing., Disp: 1 Inhaler, Rfl: 1    ERGOCALCIFEROL, VITAMIN D2, (VITAMIN D2 PO), Take  by mouth., Disp: , Rfl:     aspirin (ASPIRIN) 325 mg tablet, Take 81 mg by mouth every morning. Hold until after surgery, Disp: , Rfl:    Date Last Reviewed:  5/23/2017   # of Personal Factors/Comorbidities that affect the Plan of Care: 3+: HIGH COMPLEXITY   EXAMINATION:   Observation/Orthostatic Postural Assessment:           Forward flexed posture with rounded shoulders and forward head posture  Palpation:          Minimal tenderness R lateral hip with only minor scar tissue noted over incision; incision healed well  ROM:       Date:  5/23/17 Date:   Date:     LE ROM Left Right       Hip Flexion 120 ° 100 °       Hip Abduction 20 ° 10 °       Straight Leg Raise (SLR)  65 °                Knee Flexion 140 ° 130 °       Knee Extension 0 ° 0 °         Strength:     Date:  5/23/17 Date:   Date:     LE MMT Left Right Left Right Left Right   Hip Flex (L1/L2) 4/5 3-/5       Hip Abd (glut med) 4-/5 2+/5       Hip Ext  Not tested       Quad    ( L3/4) 4/5 4-/5       Hamstring 4/5 4-/5       Anterior Tib (L4/L5) 3/5 3-/5       Gastroc (S1/2) 3-/5 2+/5         Special Tests: deferred  Neurological Screen:        Sensation: intact to light touch but reports intermittent numbness/tingling bilateral feet  Functional Mobility:         Gait/Ambulation:  Using rolling walker with SBA with very short strides bilaterally, almost shuffling at times        Transfers:  Increased effort and time but modified independent with transfers supine to sit to stand        Bed Mobility:  Increased effort and time but independent   Balance:          Poor; unable to let go of the walker during standing   Body Structures Involved:  1. Nerves  2. Bones  3. Joints  4. Muscles  5. Ligaments Body Functions Affected:  1. Sensory/Pain  2. Neuromusculoskeletal  3. Movement Related Activities and Participation Affected:  1. General Tasks and Demands  2. Mobility  3. Self Care  4. Domestic Life  5. Community, Social and [place]   # of elements that affect the Plan of Care: 3: MODERATE COMPLEXITY   CLINICAL PRESENTATION:   Presentation: Evolving clinical presentation with changing clinical characteristics: MODERATE COMPLEXITY   CLINICAL DECISION MAKING:   Outcome Measure:    Tool Used: Lower Extremity Functional Scale (LEFS)  Score:  Initial: 12/80  Most Recent: X/80 (Date: -- )   Interpretation of Score: 20 questions each scored on a 5 point scale with 0 representing \"extreme difficulty or unable to perform\" and 4 representing \"no difficulty\". The lower the score, the greater the functional disability. 80/80 represents no disability. Minimal detectable change is 9 points. Score 80 79-63 62-48 47-32 31-16 15-1 0   Modifier CH CI CJ CK CL CM CN     ? Mobility - Walking and Moving Around:     - CURRENT STATUS: CM - 80%-99% impaired, limited or restricted    - GOAL STATUS: CK - 40%-59% impaired, limited or restricted    - D/C STATUS:  ---------------To be determined---------------    Medical Necessity:   · Patient is expected to demonstrate progress in strength, range of motion, balance and functional technique to decrease assistance required with gait and ADLs and improve safety during gait. Reason for Services/Other Comments:  · Patient continues to require skilled intervention due to having difficulty walking and requires further PT to advance exercises for balance, strengthening, and gait. Use of outcome tool(s) and clinical judgement create a POC that gives a: Questionable prediction of patient's progress: MODERATE COMPLEXITY   TREATMENT:   (In addition to Assessment/Re-Assessment sessions the following treatments were rendered)    Therapeutic Exercise: (see flow sheet below for minutes) ):  Exercises per grid below to improve mobility and strength. Required moderate visual, verbal and tactile cues to promote proper body alignment. Aquatic Therapy (see flow sheet below for minutes): Aquatic treatment performed per flow grid for Decreased muscle strength, Decreased static/dynamic balance and reactive control, Decreased range of motion and Ease of movement. Cues provided for technique and posture. Assistance by therapist provided for balance. Patient has difficulty with balance.      Date: 5/23/17       Modalities:  Defibrillator/Pacemaker                                Manual Therapy:                                Aquatic Exercise:        Gait F/B/S/M        Heel/Toe walk        4-way        PF/DF        Hamstring Curls        Hip Flexion with knee ext. (rockette)        Squats          SLR march        Lunges        Hip circles        Hip horizontal abduction/adduction        Core:          Core:        Deep well bike        Deep well jumping jelena        Deep well scissors        Deep well:  traction                Hamstring Stretch        Step Lunge        Piriformis stretch        PF Stretch        Balance: Single leg Stance        Balance: Tandem                          Therapeutic Exercise: 8 minutes       Supine hip abduction x15       bridging x15       Heel/toe raises x5                               F=forward  B=Backward  S=sidesteps  M=marches    H=hold    Manual: (see flow sheet above for minutes) ---  Modalities: (see flow sheet above for minutes) ---    HEP: discussed current home health PT HEP and will advance as appropriate in the future. Treatment/Session Assessment:  Pt arrived late for a 9:30 appointment. Pt's daughter accompanied her and needed assist with an umbrella in the rain to help get pt from her car into the clinic with her rolling walker. PT had to assist pt and her daughter with some paperwork. Discussed current HEP and instructed pt to bring in her pictures so we can add/adjust HEP as appropriate in the future. · Pain/ Symptoms: Initial:   5/10 R hip   \"I feel sore around my hip and my knee. I just want to be able to walk better. \" Post Session:  No c/o R hip pain but reports pain R knee 4/10. ·   Compliance with Program/Exercises: Will assess as treatment progresses. · Recommendations/Intent for next treatment session: \"Next visit will focus on beginning aquatics\". Pt will benefit from SBA in pool for balance and safety.    Total Treatment Duration:  PT Patient Time In/Time Out  Time In: 0940  Time Out: 1020     Nahomi Gallegos, PT

## 2017-05-23 NOTE — PROGRESS NOTES
Ambulatory/Rehab Services H2 Model Falls Risk Assessment    Risk Factor Pts. ·   Confusion/Disorientation/Impulsivity  []    4 ·   Symptomatic Depression  []   2 ·   Altered Elimination  []   1 ·   Dizziness/Vertigo  []   1 ·   Gender (Male)  []   1 ·   Any administered antiepileptics (anticonvulsants):  []   2 ·   Any administered benzodiazepines:  []   1 ·   Visual Impairment (specify):  []   1 ·   Portable Oxygen Use  []   1 ·   Orthostatic ? BP  []   1 ·   History of Recent Falls (within 3 mos.)  [x]   5     Ability to Rise from Chair (choose one) Pts. ·   Ability to rise in a single movement  []   0 ·   Pushes up, successful in one attempt  [x]   1 ·   Multiple attempts, but successful  []   3 ·   Unable to rise without assistance  []   4   Total: (5 or greater = High Risk) 6     Falls Prevention Plan:   []                Physical Limitations to Exercise (specify):   []                Mobility Assistance Device (type):   []                Exercise/Equipment Adaptation (specify):    ©2010 [facility] [employer] [address] Patent #[id number].  Federal Law prohibits the replication, distribution or use without written permission from [facility] [employer]

## 2017-06-06 ENCOUNTER — HOSPITAL ENCOUNTER (OUTPATIENT)
Dept: PHYSICAL THERAPY | Age: 82
Discharge: HOME | End: 2017-06-06
Payer: MEDICARE

## 2017-06-06 PROCEDURE — 97113 AQUATIC THERAPY/EXERCISES: CPT

## 2017-06-06 NOTE — PROGRESS NOTES
Fredna Harness  : 1928 [address]  [address] [place] [address].  [phone]   [phone]        OUTPATIENT PHYSICAL THERAPY:Daily Note 2017      ICD-10: Treatment Diagnosis: Pain in right hip (M25.551)  Difficulty in walking, not elsewhere classified (R26.2)  Precautions/Allergies:   Celebrex [celecoxib] and Other medication   Fall Risk Score: 6 (? 5 = High Risk)  MD Orders: s/p Right IT Fracture: Strengthen Hip abductors and quad, gait training WBAT MEDICAL/REFERRING DIAGNOSIS:  Hip fracture, right ([facility] [address] [S72.001A]   DATE OF ONSET: surgery 17  REFERRING PHYSICIAN: Remington Cornell MD  RETURN PHYSICIAN APPOINTMENT: 2017 pt did not recall the date at eval     INITIAL ASSESSMENT:  Ms. Seamus2 [facility] presents with R hip pain and difficulty walking s/p ORIF for IT fracture. Pt has very poor balance and requires rolling walker for gait. Pt has decreased LE strength weaker with R hip and decreased ROM with R hip and knee. Pt will benefit from PT for strengthening, ROM, balance and gait training. Pt has increased difficulty with transfers and requires some assist with ADLs. Pt will benefit from beginning in aquatic environment for ease of movement and safety with challenging pt's balance and then will progress towards further land based strengthening, balance, and gait training. PROBLEM LIST (Impacting functional limitations):  1. Decreased Strength  2. Decreased ADL/Functional Activities  3. Decreased Transfer Abilities  4. Decreased Ambulation Ability/Technique  5. Decreased Balance  6. Increased Pain  7. Decreased Activity Tolerance  8. Decreased Flexibility/Joint Mobility  9. Decreased [place] with Home Exercise Program INTERVENTIONS PLANNED:  1. Balance Exercise  2. Cold  3. Gait Training  4. Heat  5. Home Exercise Program (HEP)  6. Manual Therapy  7. Neuromuscular Re-education/Strengthening  8.  Range of Motion (ROM)  9. Therapeutic Activites  10. Therapeutic Exercise/Strengthening  11. Transfer Training  12. aquatics   TREATMENT PLAN:  Effective Dates: 5/23/17 TO 8/15/17. Frequency/Duration: 2-3 times a week for 12 weeks  GOALS: (Goals have been discussed and agreed upon with patient.)  Short-Term Functional Goals: Time Frame: 4 weeks  1. Pt will independent with HEP. 2. Pt will increase R hip at least 10 ° in each plane for increased mobility with dressing. 3. Pt will be able to participate in aquatic PT for at least 45 minutes without increased symptoms to improve mobility, gait, and strength. 4. Pt will be able to stand and balance for at least 30 seconds without her walker. 5. Pt will be able to safely navigate up/down a curb with walker with SBA. Discharge Goals: Time Frame: 12 weeks  1. Pt will be able to ambulate at least 600 ft with rolling walker safely. 2. Pt will be able to navigate up/down at least 4 stairs with railing safely. 3. Pt will be able to increase BLE strength at least 4/5 or greater for increased functional strength for squatting, gait, and stairs. 4. Pt will be able to  romberg stance for at least 30 seconds noting increased balance. 5. Pt will be able to reach overhead with both UEs while standing without UE support as if reaching into a cabinet without loss of balance. Rehabilitation Potential For Stated Goals: Excellent                  HISTORY:   History of Present Injury/Illness (Reason for Referral):  Pt reports falling for unknown reason in her bathroom Sat Feb 18 and causing R hip IT fracture. Pt underwent ORIF Sun Feb 20, 2017. Pt was discharged to the [facility] around the 2/23/17 and went home around mid April. Pt then had home health PT and was discharged to come to outpatient. Pt would like to be able to regain balance and strength to walk better.  Pt states she is walking around her home but coming into PT today was the largest distance she has walked out of her home. Past Medical History/Comorbidities:   Ms. Betty Cleary  has a past medical history of Anemia of other chronic disease (4/26/2014); Anxiety; Arrhythmia; CAD (coronary artery disease) (20O7); Chronic systolic heart failure ([facility] [address] Coagulation disorder ([facility] [address] Congestive heart failure ([facility] [address] (08/2007); Contusion of chest wall (4/29/2012); Coronary artery disease involving autologous vein coronary bypass graft without angina pectoris (6/5/2015); Falls (4/23/2014); Generalized OA (6/5/2015); Heart failure ([facility] [address] Hip pain (4/26/2014); History of skin cancer (1992); [place] (hard of hearing); Hygroma (4/23/2014); Hyperglycemia (4/29/2012); Hyperlipidemia; Hypertension; Hypotension (4/23/2014); Mild dementia (12/3/2014); Nausea & vomiting; Neutrophilic leukocytosis (8/07/0614); Osteoarthritis; Osteoporosis; Osteoporosis (12/3/2014); Postmenopausal (12/3/2014); Postmenopausal bone loss; S/P cardiac pacemaker procedure (1/2011); S/P implantation of automatic cardioverter/defibrillator (AICD) (4/29/2012); S/P placement of cardiac pacemaker (4/29/2012); Shoulder fracture (1991); and Syncope (4/29/2012). Ms. Betty Cleary  has a past surgical history that includes biopsy of skin lesion (1992); bladder suspension (1995?); juan and bso (1984); tonsillectomy (as a child); heart catheterization (2007); shoulder arthroscopy (1991); pacemaker (1/13/2011); and ptca (08/2007). Social History/Living Environment:    lives with daughter in one story home with ramp  Prior Level of Function/Work/Activity:  Independent with ADLs, Used walker intermittently prior to fall; had assist of daughter for cooking and cleaning  Current Medications:    Current Outpatient Prescriptions:     gabapentin (NEURONTIN) 100 mg capsule, TAKE ONE CAPSULE BY MOUTH AT BEDTIME, Disp: , Rfl: 0    nitroglycerin (NITROSTAT) 0.4 mg SL tablet, 1 Tab by SubLINGual route every five (5) minutes as needed. , Disp: 25 Tab, Rfl: 3    furosemide (LASIX) 20 mg tablet, One tab daily as needed, Disp: 90 Tab, Rfl: 2    carvedilol (COREG) 3.125 mg tablet, Take 1 Tab by mouth two (2) times daily (with meals). , Disp: 60 Tab, Rfl: 5    docusate sodium (COLACE) 100 mg capsule, Take 1 Cap by mouth two (2) times a day. Indications: prevent constipation, Disp: 60 Cap, Rfl: 0    atorvastatin (LIPITOR) 10 mg tablet, TAKE ONE TABLET BY MOUTH ONE TIME DAILY, Disp: 30 Tab, Rfl: 5    amiodarone (CORDARONE) 200 mg tablet, TAKE ONE-HALF TABLET BY MOUTH ONE TIME DAILY, Disp: 15 Tab, Rfl: 5    clopidogrel (PLAVIX) 75 mg tab, TAKE ONE TABLET BY MOUTH ONE TIME DAILY, Disp: 30 Tab, Rfl: 5    calcium-vitamin D (OYSTER SHELL) 500 mg(1,250mg) -200 unit per tablet, Take 1 Tab by mouth three (3) times daily (with meals). , Disp: 90 Tab, Rfl: 0    LORazepam (ATIVAN) 1 mg tablet, Take 1 Tab by mouth every twelve (12) hours as needed for Anxiety. Max Daily Amount: 2 mg., Disp: 14 Tab, Rfl: 0    diclofenac (VOLTAREN) 1 % gel, Apply  to affected area four (4) times daily. , Disp: 100 g, Rfl: 3    albuterol (PROVENTIL HFA, VENTOLIN HFA, PROAIR HFA) 90 mcg/actuation inhaler, Take 1 Puff by inhalation every four (4) hours as needed for Wheezing., Disp: 1 Inhaler, Rfl: 1    ERGOCALCIFEROL, VITAMIN D2, (VITAMIN D2 PO), Take  by mouth., Disp: , Rfl:     aspirin (ASPIRIN) 325 mg tablet, Take 81 mg by mouth every morning. Hold until after surgery, Disp: , Rfl:    Date Last Reviewed:  6/6/2017   # of Personal Factors/Comorbidities that affect the Plan of Care:    EXAMINATION:   Observation/Orthostatic Postural Assessment:           Forward flexed posture with rounded shoulders and forward head posture  Palpation:          Minimal tenderness R lateral hip with only minor scar tissue noted over incision; incision healed well  ROM:       Date:  5/23/17 Date:   Date:     LE ROM Left Right       Hip Flexion 120 ° 100 °       Hip Abduction 20 ° 10 °       Straight Leg Raise (SLR)  65 °                Knee Flexion 140 ° 130 ° Knee Extension 0 ° 0 °         Strength:     Date:  5/23/17 Date:   Date:     LE MMT Left Right Left Right Left Right   Hip Flex (L1/L2) 4/5 3-/5       Hip Abd (glut med) 4-/5 2+/5       Hip Ext  Not tested       Quad    ( L3/4) 4/5 4-/5       Hamstring 4/5 4-/5       Anterior Tib (L4/L5) 3/5 3-/5       Gastroc (S1/2) 3-/5 2+/5         Special Tests: deferred  Neurological Screen:        Sensation: intact to light touch but reports intermittent numbness/tingling bilateral feet  Functional Mobility:         Gait/Ambulation:  Using rolling walker with SBA with very short strides bilaterally, almost shuffling at times        Transfers:  Increased effort and time but modified independent with transfers supine to sit to stand        Bed Mobility:  Increased effort and time but independent   Balance:          Poor; unable to let go of the walker during standing   Body Structures Involved:  1. Nerves  2. Bones  3. Joints  4. Muscles  5. Ligaments Body Functions Affected:  1. Sensory/Pain  2. Neuromusculoskeletal  3. Movement Related Activities and Participation Affected:  1. General Tasks and Demands  2. Mobility  3. Self Care  4. Domestic Life  5. Community, Social and [place]   # of elements that affect the Plan of Care:    CLINICAL PRESENTATION:   Presentation:    CLINICAL DECISION MAKING:   Outcome Measure: Tool Used: Lower Extremity Functional Scale (LEFS)  Score:  Initial: 12/80  Most Recent: X/80 (Date: -- )   Interpretation of Score: 20 questions each scored on a 5 point scale with 0 representing \"extreme difficulty or unable to perform\" and 4 representing \"no difficulty\". The lower the score, the greater the functional disability. 80/80 represents no disability. Minimal detectable change is 9 points. Score 80 79-63 62-48 47-32 31-16 15-1 0   Modifier CH CI CJ CK CL CM CN     ?  Mobility - Walking and Moving Around:     - CURRENT STATUS: CM - 80%-99% impaired, limited or restricted    - GOAL STATUS: CK - 40%-59% impaired, limited or restricted    - D/C STATUS:  ---------------To be determined---------------    Medical Necessity:   · Patient is expected to demonstrate progress in strength, range of motion, balance and functional technique to decrease assistance required with gait and ADLs and improve safety during gait. Reason for Services/Other Comments:  · Patient continues to require skilled intervention due to having difficulty walking and requires further PT to advance exercises for balance, strengthening, and gait. Use of outcome tool(s) and clinical judgement create a POC that gives a:    TREATMENT:   (In addition to Assessment/Re-Assessment sessions the following treatments were rendered)    Therapeutic Exercise: (see flow sheet below for minutes) ):  Exercises per grid below to improve mobility and strength. Required moderate visual, verbal and tactile cues to promote proper body alignment. Aquatic Therapy (see flow sheet below for minutes): Aquatic treatment performed per flow grid for Decreased muscle strength, Decreased static/dynamic balance and reactive control, Decreased range of motion and Ease of movement. Cues provided for technique and posture. Assistance by therapist provided for balance. Patient has difficulty with balance. Date: 5/23/17 6/6/17      Modalities:  Defibrillator/Pacemaker                        Manual Therapy:                        Aquatic Exercise: With CGA/SBA of thereapist in water c pt  60 mins  1.5#      Pt to use lift chair for in/out of pool        Gait F/B/S/M  With Gait Belt  Forward  CGA 20 mins total c rest breaks      4-way hip  4x5      PF/DF  x20      Hamstring Curls        Hip Flexion with knee ext.   (rockette)  LAQ sitting in lift chair      Squats          SLR march        Core:          Core:        Deep well bike        Deep well jumping jelena        Deep well scissors        Deep well:  traction                Hamstring Stretch Piriformis stretch        PF Stretch        Balance: Single leg Stance        Balance: Tandem                Therapist accompanied pt to car, c CGA during amb gait and SBA for getting in car    done      Therapeutic Exercise: 8 minutes       Supine hip abduction x15       bridging x15       Heel/toe raises x5               F=forward  B=Backward  S=sidesteps  M=marches    H=hold    Manual: (see flow sheet above for minutes) ---  Modalities: (see flow sheet above for minutes) ---    HEP: discussed current home health PT HEP and will advance as appropriate in the future. Treatment/Session Assessment:  Pt's daughter accompanied her to therapy session. Pt amb into clinic using front-wheeled rolling walker. Pt demonstrates unsteady gait both in clinic and during aquatics. Aquatic activities completed using gait belt and CGA throughout. Pt was able to backward descend 5 steps to enter pool, c CGA,, however, used lift chair to exit pool. A very low level aquatic prograom (with approx 60-70% buoyancy) was performed today, with focus on walking. Pt amb in the water with wide ELLYN, slowed yayo, noting mild to moderate R hip discomfort/soreness. .     · Pain/ Symptoms: Initial:   5/10 R hip     \"I feel sore around my hip and my knee. I just want to be able to walk better. \"     Post Session:  No c/o R hip pain but reports pain R knee 4/10. ·     · Compliance with Program/Exercises: Will assess as treatment progresses. · Recommendations/Intent for next treatment session: \"Next visit will focus on beginning aquatics\". Pt will require CGA/SBA from therapist in the pool, due to global weakness and decreased for balance; for safety of patient.    ·   Total Treatment Duration:  PT Patient Time In/Time Out  Time In: 0930  Time Out: [address], PTA

## 2017-06-09 ENCOUNTER — HOSPITAL ENCOUNTER (OUTPATIENT)
Dept: PHYSICAL THERAPY | Age: 82
Discharge: HOME | End: 2017-06-09
Payer: MEDICARE

## 2017-06-09 PROCEDURE — 97113 AQUATIC THERAPY/EXERCISES: CPT

## 2017-06-09 NOTE — PROGRESS NOTES
Inocencio Amber  : 1928 [id number] [facility] [address]  [address] [place] [address]  [phone]   [phone]        OUTPATIENT PHYSICAL THERAPY:Daily Note 2017      ICD-10: Treatment Diagnosis: Pain in right hip (M25.551)  Difficulty in walking, not elsewhere classified (R26.2)  Precautions/Allergies:   Celebrex [celecoxib] and Other medication   Fall Risk Score: 6 (? 5 = High Risk)  MD Orders: s/p Right IT Fracture: Strengthen Hip abductors and quad, gait training WBAT MEDICAL/REFERRING DIAGNOSIS:  Hip fracture, right ([facility] [address] [S72.001A]   DATE OF ONSET: surgery 17  REFERRING PHYSICIAN: Reg Mosley MD  RETURN PHYSICIAN APPOINTMENT: 2017 pt did not recall the date at eval     INITIAL ASSESSMENT:  Ms. Baldo Hernandez presents with R hip pain and difficulty walking s/p ORIF for IT fracture. Pt has very poor balance and requires rolling walker for gait. Pt has decreased LE strength weaker with R hip and decreased ROM with R hip and knee. Pt will benefit from PT for strengthening, ROM, balance and gait training. Pt has increased difficulty with transfers and requires some assist with ADLs. Pt will benefit from beginning in aquatic environment for ease of movement and safety with challenging pt's balance and then will progress towards further land based strengthening, balance, and gait training. PROBLEM LIST (Impacting functional limitations):  1. Decreased Strength  2. Decreased ADL/Functional Activities  3. Decreased Transfer Abilities  4. Decreased Ambulation Ability/Technique  5. Decreased Balance  6. Increased Pain  7. Decreased Activity Tolerance  8. Decreased Flexibility/Joint Mobility  9. Decreased [place] with Home Exercise Program INTERVENTIONS PLANNED:  1. Balance Exercise  2. Cold  3. Gait Training  4. Heat  5. Home Exercise Program (HEP)  6. Manual Therapy  7. Neuromuscular Re-education/Strengthening  8.  Range of Motion (ROM)  9. Therapeutic Activites  10. Therapeutic Exercise/Strengthening  11. Transfer Training  12. aquatics   TREATMENT PLAN:  Effective Dates: 5/23/17 TO 8/15/17. Frequency/Duration: 2-3 times a week for 12 weeks  GOALS: (Goals have been discussed and agreed upon with patient.)  Short-Term Functional Goals: Time Frame: 4 weeks  1. Pt will independent with HEP. 2. Pt will increase R hip at least 10 ° in each plane for increased mobility with dressing. 3. Pt will be able to participate in aquatic PT for at least 45 minutes without increased symptoms to improve mobility, gait, and strength. 4. Pt will be able to stand and balance for at least 30 seconds without her walker. 5. Pt will be able to safely navigate up/down a curb with walker with SBA. Discharge Goals: Time Frame: 12 weeks  1. Pt will be able to ambulate at least 600 ft with rolling walker safely. 2. Pt will be able to navigate up/down at least 4 stairs with railing safely. 3. Pt will be able to increase BLE strength at least 4/5 or greater for increased functional strength for squatting, gait, and stairs. 4. Pt will be able to  romberg stance for at least 30 seconds noting increased balance. 5. Pt will be able to reach overhead with both UEs while standing without UE support as if reaching into a cabinet without loss of balance. Rehabilitation Potential For Stated Goals: Excellent                  HISTORY:   History of Present Injury/Illness (Reason for Referral):  Pt reports falling for unknown reason in her bathroom Sat Feb 18 and causing R hip IT fracture. Pt underwent ORIF Sun Feb 20, 2017. Pt was discharged to the [facility] around the 2/23/17 and went home around mid April. Pt then had home health PT and was discharged to come to outpatient. Pt would like to be able to regain balance and strength to walk better.  Pt states she is walking around her home but coming into PT today was the largest distance she has walked out of her home. Past Medical History/Comorbidities:   Ms. Amarjit Pineda  has a past medical history of Anemia of other chronic disease (4/26/2014); Anxiety; Arrhythmia; CAD (coronary artery disease) (20O7); Chronic systolic heart failure ([facility] [address] Coagulation disorder ([facility] [address] Congestive heart failure ([facility] [address] (08/2007); Contusion of chest wall (4/29/2012); Coronary artery disease involving autologous vein coronary bypass graft without angina pectoris (6/5/2015); Falls (4/23/2014); Generalized OA (6/5/2015); Heart failure ([facility] [address] Hip pain (4/26/2014); History of skin cancer (1992); [place] (hard of hearing); Hygroma (4/23/2014); Hyperglycemia (4/29/2012); Hyperlipidemia; Hypertension; Hypotension (4/23/2014); Mild dementia (12/3/2014); Nausea & vomiting; Neutrophilic leukocytosis (1/91/6463); Osteoarthritis; Osteoporosis; Osteoporosis (12/3/2014); Postmenopausal (12/3/2014); Postmenopausal bone loss; S/P cardiac pacemaker procedure (1/2011); S/P implantation of automatic cardioverter/defibrillator (AICD) (4/29/2012); S/P placement of cardiac pacemaker (4/29/2012); Shoulder fracture (1991); and Syncope (4/29/2012). Ms. Amarjit Pineda  has a past surgical history that includes biopsy of skin lesion (1992); bladder suspension (1995?); juan and bso (1984); tonsillectomy (as a child); heart catheterization (2007); shoulder arthroscopy (1991); pacemaker (1/13/2011); and ptca (08/2007). Social History/Living Environment:    lives with daughter in one story home with ramp  Prior Level of Function/Work/Activity:  Independent with ADLs, Used walker intermittently prior to fall; had assist of daughter for cooking and cleaning  Current Medications:    Current Outpatient Prescriptions:     gabapentin (NEURONTIN) 100 mg capsule, TAKE ONE CAPSULE BY MOUTH AT BEDTIME, Disp: , Rfl: 0    nitroglycerin (NITROSTAT) 0.4 mg SL tablet, 1 Tab by SubLINGual route every five (5) minutes as needed. , Disp: 25 Tab, Rfl: 3    furosemide (LASIX) 20 mg tablet, One tab daily as needed, Disp: 90 Tab, Rfl: 2    carvedilol (COREG) 3.125 mg tablet, Take 1 Tab by mouth two (2) times daily (with meals). , Disp: 60 Tab, Rfl: 5    docusate sodium (COLACE) 100 mg capsule, Take 1 Cap by mouth two (2) times a day. Indications: prevent constipation, Disp: 60 Cap, Rfl: 0    atorvastatin (LIPITOR) 10 mg tablet, TAKE ONE TABLET BY MOUTH ONE TIME DAILY, Disp: 30 Tab, Rfl: 5    amiodarone (CORDARONE) 200 mg tablet, TAKE ONE-HALF TABLET BY MOUTH ONE TIME DAILY, Disp: 15 Tab, Rfl: 5    clopidogrel (PLAVIX) 75 mg tab, TAKE ONE TABLET BY MOUTH ONE TIME DAILY, Disp: 30 Tab, Rfl: 5    calcium-vitamin D (OYSTER SHELL) 500 mg(1,250mg) -200 unit per tablet, Take 1 Tab by mouth three (3) times daily (with meals). , Disp: 90 Tab, Rfl: 0    LORazepam (ATIVAN) 1 mg tablet, Take 1 Tab by mouth every twelve (12) hours as needed for Anxiety. Max Daily Amount: 2 mg., Disp: 14 Tab, Rfl: 0    diclofenac (VOLTAREN) 1 % gel, Apply  to affected area four (4) times daily. , Disp: 100 g, Rfl: 3    albuterol (PROVENTIL HFA, VENTOLIN HFA, PROAIR HFA) 90 mcg/actuation inhaler, Take 1 Puff by inhalation every four (4) hours as needed for Wheezing., Disp: 1 Inhaler, Rfl: 1    ERGOCALCIFEROL, VITAMIN D2, (VITAMIN D2 PO), Take  by mouth., Disp: , Rfl:     aspirin (ASPIRIN) 325 mg tablet, Take 81 mg by mouth every morning. Hold until after surgery, Disp: , Rfl:    Date Last Reviewed:  6/9/2017   # of Personal Factors/Comorbidities that affect the Plan of Care:    EXAMINATION:   Observation/Orthostatic Postural Assessment:           Forward flexed posture with rounded shoulders and forward head posture  Palpation:          Minimal tenderness R lateral hip with only minor scar tissue noted over incision; incision healed well  ROM:       Date:  5/23/17 Date:   Date:     LE ROM Left Right       Hip Flexion 120 ° 100 °       Hip Abduction 20 ° 10 °       Straight Leg Raise (SLR)  65 °                Knee Flexion 140 ° 130 ° Knee Extension 0 ° 0 °         Strength:     Date:  5/23/17 Date:   Date:     LE MMT Left Right Left Right Left Right   Hip Flex (L1/L2) 4/5 3-/5       Hip Abd (glut med) 4-/5 2+/5       Hip Ext  Not tested       Quad    ( L3/4) 4/5 4-/5       Hamstring 4/5 4-/5       Anterior Tib (L4/L5) 3/5 3-/5       Gastroc (S1/2) 3-/5 2+/5         Special Tests: deferred  Neurological Screen:        Sensation: intact to light touch but reports intermittent numbness/tingling bilateral feet  Functional Mobility:         Gait/Ambulation:  Using rolling walker with SBA with very short strides bilaterally, almost shuffling at times        Transfers:  Increased effort and time but modified independent with transfers supine to sit to stand        Bed Mobility:  Increased effort and time but independent   Balance:          Poor; unable to let go of the walker during standing   Body Structures Involved:  1. Nerves  2. Bones  3. Joints  4. Muscles  5. Ligaments Body Functions Affected:  1. Sensory/Pain  2. Neuromusculoskeletal  3. Movement Related Activities and Participation Affected:  1. General Tasks and Demands  2. Mobility  3. Self Care  4. Domestic Life  5. Community, Social and [place]   # of elements that affect the Plan of Care:    CLINICAL PRESENTATION:   Presentation:    CLINICAL DECISION MAKING:   Outcome Measure: Tool Used: Lower Extremity Functional Scale (LEFS)  Score:  Initial: 12/80  Most Recent: X/80 (Date: -- )   Interpretation of Score: 20 questions each scored on a 5 point scale with 0 representing \"extreme difficulty or unable to perform\" and 4 representing \"no difficulty\". The lower the score, the greater the functional disability. 80/80 represents no disability. Minimal detectable change is 9 points. Score 80 79-63 62-48 47-32 31-16 15-1 0   Modifier CH CI CJ CK CL CM CN     ?  Mobility - Walking and Moving Around:     - CURRENT STATUS: CM - 80%-99% impaired, limited or restricted    - GOAL STATUS: CK - 40%-59% impaired, limited or restricted    - D/C STATUS:  ---------------To be determined---------------    Medical Necessity:   · Patient is expected to demonstrate progress in strength, range of motion, balance and functional technique to decrease assistance required with gait and ADLs and improve safety during gait. Reason for Services/Other Comments:  · Patient continues to require skilled intervention due to having difficulty walking and requires further PT to advance exercises for balance, strengthening, and gait. Use of outcome tool(s) and clinical judgement create a POC that gives a:    TREATMENT:   (In addition to Assessment/Re-Assessment sessions the following treatments were rendered)    Therapeutic Exercise: (see flow sheet below for minutes) ):  Exercises per grid below to improve mobility and strength. Required moderate visual, verbal and tactile cues to promote proper body alignment. Aquatic Therapy (see flow sheet below for minutes): Aquatic treatment performed per flow grid for Decreased muscle strength, Decreased static/dynamic balance and reactive control, Decreased range of motion and Ease of movement. Cues provided for technique and posture. Assistance by therapist provided for balance. Patient has difficulty with balance. Date: 5/23/17 6/6/17      Modalities:  Defibrillator/Pacemaker                        Manual Therapy:                        Aquatic Exercise: With CGA/SBA of thereapist in water c pt  60 mins  1.5# 45 min     Pt to use lift chair for in/out of pool        Gait F/B/S/M  With Gait Belt  Forward  CGA 20 mins total c rest breaks [id number]  X2L March  Side step 10ft to L   CGA/Ever c rest breaks     4-way hip  4x5 X10 B     PF/DF  x20 X15     Hamstring Curls        Hip Flexion with knee ext.   (rockette)  LAQ sitting in lift chair LAQ sitting in lift chair X15 BLE     Squats     X15 CGA     SLR march        Core:          Core:        Deep well bike        Deep well jumping jelena        Deep well scissors        Deep well:  traction                Hamstring Stretch        Piriformis stretch        PF Stretch        Balance: Single leg Stance        Balance: Tandem                Therapist accompanied pt to car, c CGA during amb gait and SBA for getting in car    done      Therapeutic Exercise: 8 minutes       Supine hip abduction x15       bridging x15       Heel/toe raises x5               F=forward  B=Backward  S=sidesteps  M=marches    H=hold    Manual: (see flow sheet above for minutes) ---  Modalities: (see flow sheet above for minutes) ---    HEP: discussed current home health PT HEP and will advance as appropriate in the future. Treatment/Session Assessment:  Pt's daughter accompanied her to therapy session. Pt amb into clinic using front-wheeled rolling walker. Pt demonstrates unsteady gait both in clinic and during aquatics needing CGA/Ever in standing. Used lift chair to enter and exit pool. Pt amb in the water with wide ELLYN, slowed yayo, decreased step length and was unable to perform backward walking due to R hip discomfort/soreness. Will continue aquatic next visit per patient tolerance. · Pain/ Symptoms: Initial:   4/10 R hip     Patient reports feeling sore and tired following last session. Post Session:  3/10 ·     · Compliance with Program/Exercises: Will assess as treatment progresses. · Recommendations/Intent for next treatment session: \"Next visit will continue aquatics\". Pt will require CGA/SBA from therapist in the pool, due to global weakness and decreased for balance; for safety of patient.    ·   Total Treatment Duration:  PT Patient Time In/Time Out  Time In: 1015  Time Out: [address]

## 2017-06-12 ENCOUNTER — HOSPITAL ENCOUNTER (OUTPATIENT)
Dept: PHYSICAL THERAPY | Age: 82
Discharge: HOME | End: 2017-06-12
Payer: MEDICARE

## 2017-06-12 PROCEDURE — 97113 AQUATIC THERAPY/EXERCISES: CPT

## 2017-06-12 NOTE — PROGRESS NOTES
Ingrid Pfeiffer  : 1928 [id number], [address] [place] [address]  [phone]   [phone]        OUTPATIENT PHYSICAL THERAPY:Daily Note 2017      ICD-10: Treatment Diagnosis: Pain in right hip (M25.551)  Difficulty in walking, not elsewhere classified (R26.2)  Precautions/Allergies:   Celebrex [celecoxib] and Other medication   Fall Risk Score: 6 (? 5 = High Risk)  MD Orders: s/p Right IT Fracture: Strengthen Hip abductors and quad, gait training WBAT MEDICAL/REFERRING DIAGNOSIS:  Hip fracture, right ([facility] [address] [S72.001A]   DATE OF ONSET: surgery 17  REFERRING PHYSICIAN: Darshana Byrd MD  RETURN PHYSICIAN APPOINTMENT: 2017 pt did not recall the date at eval     INITIAL ASSESSMENT:  Ms. Didi Lu presents with R hip pain and difficulty walking s/p ORIF for IT fracture. Pt has very poor balance and requires rolling walker for gait. Pt has decreased LE strength weaker with R hip and decreased ROM with R hip and knee. Pt will benefit from PT for strengthening, ROM, balance and gait training. Pt has increased difficulty with transfers and requires some assist with ADLs. Pt will benefit from beginning in aquatic environment for ease of movement and safety with challenging pt's balance and then will progress towards further land based strengthening, balance, and gait training. PROBLEM LIST (Impacting functional limitations):  1. Decreased Strength  2. Decreased ADL/Functional Activities  3. Decreased Transfer Abilities  4. Decreased Ambulation Ability/Technique  5. Decreased Balance  6. Increased Pain  7. Decreased Activity Tolerance  8. Decreased Flexibility/Joint Mobility  9. Decreased [place] with Home Exercise Program INTERVENTIONS PLANNED:  1. Balance Exercise  2. Cold  3. Gait Training  4. Heat  5. Home Exercise Program (HEP)  6. Manual Therapy  7. Neuromuscular Re-education/Strengthening  8.  Range of Motion (ROM)  9. Therapeutic Activites  10. Therapeutic Exercise/Strengthening  11. Transfer Training  12. aquatics   TREATMENT PLAN:  Effective Dates: 5/23/17 TO 8/15/17. Frequency/Duration: 2-3 times a week for 12 weeks  GOALS: (Goals have been discussed and agreed upon with patient.)  Short-Term Functional Goals: Time Frame: 4 weeks  1. Pt will independent with HEP. 2. Pt will increase R hip at least 10 ° in each plane for increased mobility with dressing. 3. Pt will be able to participate in aquatic PT for at least 45 minutes without increased symptoms to improve mobility, gait, and strength. 4. Pt will be able to stand and balance for at least 30 seconds without her walker. 5. Pt will be able to safely navigate up/down a curb with walker with SBA. Discharge Goals: Time Frame: 12 weeks  1. Pt will be able to ambulate at least 600 ft with rolling walker safely. 2. Pt will be able to navigate up/down at least 4 stairs with railing safely. 3. Pt will be able to increase BLE strength at least 4/5 or greater for increased functional strength for squatting, gait, and stairs. 4. Pt will be able to  romberg stance for at least 30 seconds noting increased balance. 5. Pt will be able to reach overhead with both UEs while standing without UE support as if reaching into a cabinet without loss of balance. Rehabilitation Potential For Stated Goals: Excellent                  HISTORY:   History of Present Injury/Illness (Reason for Referral):  Pt reports falling for unknown reason in her bathroom Sat Feb 18 and causing R hip IT fracture. Pt underwent ORIF Sun Feb 20, 2017. Pt was discharged to the [facility] around the 2/23/17 and went home around mid April. Pt then had home health PT and was discharged to come to outpatient. Pt would like to be able to regain balance and strength to walk better.  Pt states she is walking around her home but coming into PT today was the largest distance she has walked out of her home. Past Medical History/Comorbidities:   Ms. Guzman Nelson  has a past medical history of Anemia of other chronic disease (4/26/2014); Anxiety; Arrhythmia; CAD (coronary artery disease) (20O7); Chronic systolic heart failure ([facility] [address] Coagulation disorder ([facility] [address] Congestive heart failure ([facility] [address] (08/2007); Contusion of chest wall (4/29/2012); Coronary artery disease involving autologous vein coronary bypass graft without angina pectoris (6/5/2015); Falls (4/23/2014); Generalized OA (6/5/2015); Heart failure ([facility] [address] Hip pain (4/26/2014); History of skin cancer (1992); [place] (hard of hearing); Hygroma (4/23/2014); Hyperglycemia (4/29/2012); Hyperlipidemia; Hypertension; Hypotension (4/23/2014); Mild dementia (12/3/2014); Nausea & vomiting; Neutrophilic leukocytosis (4/84/8835); Osteoarthritis; Osteoporosis; Osteoporosis (12/3/2014); Postmenopausal (12/3/2014); Postmenopausal bone loss; S/P cardiac pacemaker procedure (1/2011); S/P implantation of automatic cardioverter/defibrillator (AICD) (4/29/2012); S/P placement of cardiac pacemaker (4/29/2012); Shoulder fracture (1991); and Syncope (4/29/2012). Ms. Guzman Nelson  has a past surgical history that includes biopsy of skin lesion (1992); bladder suspension (1995?); juan and bso (1984); tonsillectomy (as a child); heart catheterization (2007); shoulder arthroscopy (1991); pacemaker (1/13/2011); and ptca (08/2007). Social History/Living Environment:    lives with daughter in one story home with ramp  Prior Level of Function/Work/Activity:  Independent with ADLs, Used walker intermittently prior to fall; had assist of daughter for cooking and cleaning  Current Medications:    Current Outpatient Prescriptions:     gabapentin (NEURONTIN) 100 mg capsule, TAKE ONE CAPSULE BY MOUTH AT BEDTIME, Disp: , Rfl: 0    nitroglycerin (NITROSTAT) 0.4 mg SL tablet, 1 Tab by SubLINGual route every five (5) minutes as needed. , Disp: 25 Tab, Rfl: 3    furosemide (LASIX) 20 mg tablet, One tab daily as needed, Disp: 90 Tab, Rfl: 2    carvedilol (COREG) 3.125 mg tablet, Take 1 Tab by mouth two (2) times daily (with meals). , Disp: 60 Tab, Rfl: 5    docusate sodium (COLACE) 100 mg capsule, Take 1 Cap by mouth two (2) times a day. Indications: prevent constipation, Disp: 60 Cap, Rfl: 0    atorvastatin (LIPITOR) 10 mg tablet, TAKE ONE TABLET BY MOUTH ONE TIME DAILY, Disp: 30 Tab, Rfl: 5    amiodarone (CORDARONE) 200 mg tablet, TAKE ONE-HALF TABLET BY MOUTH ONE TIME DAILY, Disp: 15 Tab, Rfl: 5    clopidogrel (PLAVIX) 75 mg tab, TAKE ONE TABLET BY MOUTH ONE TIME DAILY, Disp: 30 Tab, Rfl: 5    calcium-vitamin D (OYSTER SHELL) 500 mg(1,250mg) -200 unit per tablet, Take 1 Tab by mouth three (3) times daily (with meals). , Disp: 90 Tab, Rfl: 0    LORazepam (ATIVAN) 1 mg tablet, Take 1 Tab by mouth every twelve (12) hours as needed for Anxiety. Max Daily Amount: 2 mg., Disp: 14 Tab, Rfl: 0    diclofenac (VOLTAREN) 1 % gel, Apply  to affected area four (4) times daily. , Disp: 100 g, Rfl: 3    albuterol (PROVENTIL HFA, VENTOLIN HFA, PROAIR HFA) 90 mcg/actuation inhaler, Take 1 Puff by inhalation every four (4) hours as needed for Wheezing., Disp: 1 Inhaler, Rfl: 1    ERGOCALCIFEROL, VITAMIN D2, (VITAMIN D2 PO), Take  by mouth., Disp: , Rfl:     aspirin (ASPIRIN) 325 mg tablet, Take 81 mg by mouth every morning. Hold until after surgery, Disp: , Rfl:    Date Last Reviewed:  6/12/2017   # of Personal Factors/Comorbidities that affect the Plan of Care:    EXAMINATION:   Observation/Orthostatic Postural Assessment:           Forward flexed posture with rounded shoulders and forward head posture  Palpation:          Minimal tenderness R lateral hip with only minor scar tissue noted over incision; incision healed well  ROM:       Date:  5/23/17 Date:   Date:     LE ROM Left Right       Hip Flexion 120 ° 100 °       Hip Abduction 20 ° 10 °       Straight Leg Raise (SLR)  65 °                Knee Flexion 140 ° 130 ° Knee Extension 0 ° 0 °         Strength:     Date:  5/23/17 Date:   Date:     LE MMT Left Right Left Right Left Right   Hip Flex (L1/L2) 4/5 3-/5       Hip Abd (glut med) 4-/5 2+/5       Hip Ext  Not tested       Quad    ( L3/4) 4/5 4-/5       Hamstring 4/5 4-/5       Anterior Tib (L4/L5) 3/5 3-/5       Gastroc (S1/2) 3-/5 2+/5         Special Tests: deferred  Neurological Screen:        Sensation: intact to light touch but reports intermittent numbness/tingling bilateral feet  Functional Mobility:         Gait/Ambulation:  Using rolling walker with SBA with very short strides bilaterally, almost shuffling at times        Transfers:  Increased effort and time but modified independent with transfers supine to sit to stand        Bed Mobility:  Increased effort and time but independent   Balance:          Poor; unable to let go of the walker during standing   Body Structures Involved:  1. Nerves  2. Bones  3. Joints  4. Muscles  5. Ligaments Body Functions Affected:  1. Sensory/Pain  2. Neuromusculoskeletal  3. Movement Related Activities and Participation Affected:  1. General Tasks and Demands  2. Mobility  3. Self Care  4. Domestic Life  5. Community, Social and [place]   # of elements that affect the Plan of Care:    CLINICAL PRESENTATION:   Presentation:    CLINICAL DECISION MAKING:   Outcome Measure: Tool Used: Lower Extremity Functional Scale (LEFS)  Score:  Initial: 12/80  Most Recent: X/80 (Date: -- )   Interpretation of Score: 20 questions each scored on a 5 point scale with 0 representing \"extreme difficulty or unable to perform\" and 4 representing \"no difficulty\". The lower the score, the greater the functional disability. 80/80 represents no disability. Minimal detectable change is 9 points. Score 80 79-63 62-48 47-32 31-16 15-1 0   Modifier CH CI CJ CK CL CM CN     ?  Mobility - Walking and Moving Around:     - CURRENT STATUS: CM - 80%-99% impaired, limited or restricted    - GOAL STATUS: CK - 40%-59% impaired, limited or restricted    - D/C STATUS:  ---------------To be determined---------------    Medical Necessity:   · Patient is expected to demonstrate progress in strength, range of motion, balance and functional technique to decrease assistance required with gait and ADLs and improve safety during gait. Reason for Services/Other Comments:  · Patient continues to require skilled intervention due to having difficulty walking and requires further PT to advance exercises for balance, strengthening, and gait. Use of outcome tool(s) and clinical judgement create a POC that gives a:    TREATMENT:   (In addition to Assessment/Re-Assessment sessions the following treatments were rendered)    Therapeutic Exercise: (see flow sheet below for minutes) ):  Exercises per grid below to improve mobility and strength. Required moderate visual, verbal and tactile cues to promote proper body alignment. Aquatic Therapy (see flow sheet below for minutes): Aquatic treatment performed per flow grid for Decreased muscle strength, Decreased static/dynamic balance and reactive control, Decreased range of motion and Ease of movement. Cues provided for technique and posture. Assistance by therapist provided for balance. Patient has difficulty with balance. Date: 5/23/17 6/6/17 6/9/17 6/12/17    Modalities:  Defibrillator/Pacemaker                        Manual Therapy:                        Aquatic Exercise: With CGA/SBA of thereapist in water c pt  60 mins  1.5# 45 min 1.5# 55 min    Pt to use lift chair for in/out of pool        Gait F/B/S/M  With Gait Belt  Forward  CGA 20 mins total c rest breaks [id number]  X2L March  Side step 10ft to L   CGA/Ever c rest breaks X6L Forward 4ft CGA    X2L Forward 3 ft Min A    4-way hip  4x5 X10 B X10 B    PF/DF  x20 X15 X10    Hamstring Curls        Hip Flexion with knee ext.   (rockette)  LAQ sitting in lift chair LAQ sitting in lift chair X15 BLE Squats     X15 CGA X15    SLR march        Core:          Core:        Deep well bike        Deep well jumping jelena        Deep well scissors        Deep well:  traction        Core: Seated rows    X10 c open paddles    Core: Seated abd/add    X10 c open paddles    Seated hip abd/add    Yellow ball squeeze X10H5  Manual resisance abd X10H5    Seated leg press    Manual resistance X10 B    Seated Marches       X15 B    Seated LAQ    X15 B    Hamstring Stretch        Piriformis stretch        PF Stretch        Balance: Single leg Stance        Balance: Tandem                Therapist accompanied pt to car, c CGA during amb gait and SBA for getting in car    done      Therapeutic Exercise: 8 minutes       Supine hip abduction x15       bridging x15       Heel/toe raises x5               F=forward  B=Backward  S=sidesteps  M=marches    H=hold    Manual: (see flow sheet above for minutes) ---  Modalities: (see flow sheet above for minutes) ---    HEP: discussed current home health PT HEP and will advance as appropriate in the future. Treatment/Session Assessment:  Pt's daughter accompanied her to therapy session. Pt amb into clinic using front-wheeled rolling walker. Pt demonstrates unsteady gait both in clinic and during aquatics needing CGA/Ever in standing. Used lift chair to enter and exit pool. Pt needed tactile cues for poper execution of LAQ and seated marches. Pt amb in the water needing verbal cues for increase step length needing standing rest breaks following lap exercises. Pt c/o minimal increase inR hip discomfort/soreness. Will continue aquatic next visit per patient tolerance. · Pain/ Symptoms: Initial:   2/10 R hip     Patient reports feeling ok but having some pain. Post Session:  2/10 ·     · Compliance with Program/Exercises: Will assess as treatment progresses. · Recommendations/Intent for next treatment session: \"Next visit will continue aquatics\".  Pt will require CGA/SBA from therapist in the pool, due to global weakness and decreased for balance; for safety of patient.    ·   Total Treatment Duration:  PT Patient Time In/Time Out  Time In: 1325  Time Out: Jeremiah [id number]

## 2017-06-15 ENCOUNTER — HOSPITAL ENCOUNTER (OUTPATIENT)
Dept: PHYSICAL THERAPY | Age: 82
Discharge: HOME | End: 2017-06-15
Payer: MEDICARE

## 2017-06-15 PROCEDURE — 97113 AQUATIC THERAPY/EXERCISES: CPT

## 2017-06-15 NOTE — PROGRESS NOTES
Kevan Sheikh  : 1928 [id number] [facility],[address]  [address] [place] [address].  [phone]   [phone]        OUTPATIENT PHYSICAL THERAPY:Daily Note 6/15/2017      ICD-10: Treatment Diagnosis: Pain in right hip (M25.551)  Difficulty in walking, not elsewhere classified (R26.2)  Precautions/Allergies:   Celebrex [celecoxib] and Other medication   Fall Risk Score: 6 (? 5 = High Risk)  MD Orders: s/p Right IT Fracture: Strengthen Hip abductors and quad, gait training WBAT MEDICAL/REFERRING DIAGNOSIS:  Hip fracture, right ([address] [S72.001A]   DATE OF ONSET: surgery 17  REFERRING PHYSICIAN: Colleen Albarado MD  RETURN PHYSICIAN APPOINTMENT: 2017 pt did not recall the date at eval     INITIAL ASSESSMENT:  Ms. Wilder Clark presents with R hip pain and difficulty walking s/p ORIF for IT fracture. Pt has very poor balance and requires rolling walker for gait. Pt has decreased LE strength weaker with R hip and decreased ROM with R hip and knee. Pt will benefit from PT for strengthening, ROM, balance and gait training. Pt has increased difficulty with transfers and requires some assist with ADLs. Pt will benefit from beginning in aquatic environment for ease of movement and safety with challenging pt's balance and then will progress towards further land based strengthening, balance, and gait training. PROBLEM LIST (Impacting functional limitations):  1. Decreased Strength  2. Decreased ADL/Functional Activities  3. Decreased Transfer Abilities  4. Decreased Ambulation Ability/Technique  5. Decreased Balance  6. Increased Pain  7. Decreased Activity Tolerance  8. Decreased Flexibility/Joint Mobility  9. Decreased [place] with Home Exercise Program INTERVENTIONS PLANNED:  1. Balance Exercise  2. Cold  3. Gait Training  4. Heat  5. Home Exercise Program (HEP)  6. Manual Therapy  7. Neuromuscular Re-education/Strengthening  8.  Range of Motion (ROM)  9. Therapeutic Activites  10. Therapeutic Exercise/Strengthening  11. Transfer Training  12. aquatics   TREATMENT PLAN:  Effective Dates: 5/23/17 TO 8/15/17. Frequency/Duration: 2-3 times a week for 12 weeks  GOALS: (Goals have been discussed and agreed upon with patient.)  Short-Term Functional Goals: Time Frame: 4 weeks  1. Pt will independent with HEP. 2. Pt will increase R hip at least 10 ° in each plane for increased mobility with dressing. 3. Pt will be able to participate in aquatic PT for at least 45 minutes without increased symptoms to improve mobility, gait, and strength. 4. Pt will be able to stand and balance for at least 30 seconds without her walker. 5. Pt will be able to safely navigate up/down a curb with walker with SBA. Discharge Goals: Time Frame: 12 weeks  1. Pt will be able to ambulate at least 600 ft with rolling walker safely. 2. Pt will be able to navigate up/down at least 4 stairs with railing safely. 3. Pt will be able to increase BLE strength at least 4/5 or greater for increased functional strength for squatting, gait, and stairs. 4. Pt will be able to  romberg stance for at least 30 seconds noting increased balance. 5. Pt will be able to reach overhead with both UEs while standing without UE support as if reaching into a cabinet without loss of balance. Rehabilitation Potential For Stated Goals: Excellent                  HISTORY:   History of Present Injury/Illness (Reason for Referral):  Pt reports falling for unknown reason in her bathroom Sat Feb 18 and causing R hip IT fracture. Pt underwent ORIF Sun Feb 20, 2017. Pt was discharged to the [facility] around the 2/23/17 and went home around mid April. Pt then had home health PT and was discharged to come to outpatient. Pt would like to be able to regain balance and strength to walk better.  Pt states she is walking around her home but coming into PT today was the largest distance she has walked out of her home. Past Medical History/Comorbidities:   Ms. Gabriela Stephenson  has a past medical history of Anemia of other chronic disease (4/26/2014); Anxiety; Arrhythmia; CAD (coronary artery disease) (20O7); Chronic systolic heart failure ([facility] Coagulation disorder ([facility] Congestive heart failure ([facility] (08/2007); Contusion of chest wall (4/29/2012); Coronary artery disease involving autologous vein coronary bypass graft without angina pectoris (6/5/2015); Falls (4/23/2014); Generalized OA (6/5/2015); Heart failure ([facility] Hip pain (4/26/2014); History of skin cancer (1992); [place] (hard of hearing); Hygroma (4/23/2014); Hyperglycemia (4/29/2012); Hyperlipidemia; Hypertension; Hypotension (4/23/2014); Mild dementia (12/3/2014); Nausea & vomiting; Neutrophilic leukocytosis (0/59/5388); Osteoarthritis; Osteoporosis; Osteoporosis (12/3/2014); Postmenopausal (12/3/2014); Postmenopausal bone loss; S/P cardiac pacemaker procedure (1/2011); S/P implantation of automatic cardioverter/defibrillator (AICD) (4/29/2012); S/P placement of cardiac pacemaker (4/29/2012); Shoulder fracture (1991); and Syncope (4/29/2012). Ms. Gabriela Stephenson  has a past surgical history that includes biopsy of skin lesion (1992); bladder suspension (1995?); juan and bso (1984); tonsillectomy (as a child); heart catheterization (2007); shoulder arthroscopy (1991); pacemaker (1/13/2011); and ptca (08/2007). Social History/Living Environment:    lives with daughter in one story home with ramp  Prior Level of Function/Work/Activity:  Independent with ADLs, Used walker intermittently prior to fall; had assist of daughter for cooking and cleaning  Current Medications:    Current Outpatient Prescriptions:     gabapentin (NEURONTIN) 100 mg capsule, TAKE ONE CAPSULE BY MOUTH AT BEDTIME, Disp: , Rfl: 0    nitroglycerin (NITROSTAT) 0.4 mg SL tablet, 1 Tab by SubLINGual route every five (5) minutes as needed. , Disp: 25 Tab, Rfl: 3    furosemide (LASIX) 20 mg tablet, One tab daily as needed, Disp: 90 Tab, Rfl: 2    carvedilol (COREG) 3.125 mg tablet, Take 1 Tab by mouth two (2) times daily (with meals). , Disp: 60 Tab, Rfl: 5    docusate sodium (COLACE) 100 mg capsule, Take 1 Cap by mouth two (2) times a day. Indications: prevent constipation, Disp: 60 Cap, Rfl: 0    atorvastatin (LIPITOR) 10 mg tablet, TAKE ONE TABLET BY MOUTH ONE TIME DAILY, Disp: 30 Tab, Rfl: 5    amiodarone (CORDARONE) 200 mg tablet, TAKE ONE-HALF TABLET BY MOUTH ONE TIME DAILY, Disp: 15 Tab, Rfl: 5    clopidogrel (PLAVIX) 75 mg tab, TAKE ONE TABLET BY MOUTH ONE TIME DAILY, Disp: 30 Tab, Rfl: 5    calcium-vitamin D (OYSTER SHELL) 500 mg(1,250mg) -200 unit per tablet, Take 1 Tab by mouth three (3) times daily (with meals). , Disp: 90 Tab, Rfl: 0    LORazepam (ATIVAN) 1 mg tablet, Take 1 Tab by mouth every twelve (12) hours as needed for Anxiety. Max Daily Amount: 2 mg., Disp: 14 Tab, Rfl: 0    diclofenac (VOLTAREN) 1 % gel, Apply  to affected area four (4) times daily. , Disp: 100 g, Rfl: 3    albuterol (PROVENTIL HFA, VENTOLIN HFA, PROAIR HFA) 90 mcg/actuation inhaler, Take 1 Puff by inhalation every four (4) hours as needed for Wheezing., Disp: 1 Inhaler, Rfl: 1    ERGOCALCIFEROL, VITAMIN D2, (VITAMIN D2 PO), Take  by mouth., Disp: , Rfl:     aspirin (ASPIRIN) 325 mg tablet, Take 81 mg by mouth every morning. Hold until after surgery, Disp: , Rfl:    Date Last Reviewed:  6/15/2017   EXAMINATION:   Observation/Orthostatic Postural Assessment:           Forward flexed posture with rounded shoulders and forward head posture  Palpation:          Minimal tenderness R lateral hip with only minor scar tissue noted over incision; incision healed well  ROM:       Date:  5/23/17 Date:   Date:     LE ROM Left Right       Hip Flexion 120 ° 100 °       Hip Abduction 20 ° 10 °       Straight Leg Raise (SLR)  65 °                Knee Flexion 140 ° 130 °       Knee Extension 0 ° 0 °         Strength:     Date:  5/23/17 Date: Date:     LE MMT Left Right Left Right Left Right   Hip Flex (L1/L2) 4/5 3-/5       Hip Abd (glut med) 4-/5 2+/5       Hip Ext  Not tested       Quad    ( L3/4) 4/5 4-/5       Hamstring 4/5 4-/5       Anterior Tib (L4/L5) 3/5 3-/5       Gastroc (S1/2) 3-/5 2+/5         Special Tests: deferred  Neurological Screen:        Sensation: intact to light touch but reports intermittent numbness/tingling bilateral feet  Functional Mobility:         Gait/Ambulation:  Using rolling walker with SBA with very short strides bilaterally, almost shuffling at times        Transfers:  Increased effort and time but modified independent with transfers supine to sit to stand        Bed Mobility:  Increased effort and time but independent   Balance:          Poor; unable to let go of the walker during standing   Body Structures Involved:  1. Nerves  2. Bones  3. Joints  4. Muscles  5. Ligaments Body Functions Affected:  1. Sensory/Pain  2. Neuromusculoskeletal  3. Movement Related Activities and Participation Affected:  1. General Tasks and Demands  2. Mobility  3. Self Care  4. Domestic Life  5. Community, Social and Civic Life   CLINICAL PRESENTATION:   CLINICAL DECISION MAKING:   Outcome Measure: Tool Used: Lower Extremity Functional Scale (LEFS)  Score:  Initial: 12/80  Most Recent: X/80 (Date: -- )   Interpretation of Score: 20 questions each scored on a 5 point scale with 0 representing \"extreme difficulty or unable to perform\" and 4 representing \"no difficulty\". The lower the score, the greater the functional disability. 80/80 represents no disability. Minimal detectable change is 9 points. Score 80 79-63 62-48 47-32 31-16 15-1 0   Modifier CH CI CJ CK CL CM CN     ?  Mobility - Walking and Moving Around:     - CURRENT STATUS: CM - 80%-99% impaired, limited or restricted    - GOAL STATUS: CK - 40%-59% impaired, limited or restricted    - D/C STATUS:  ---------------To be determined---------------    Medical Necessity:   · Patient is expected to demonstrate progress in strength, range of motion, balance and functional technique to decrease assistance required with gait and ADLs and improve safety during gait. Reason for Services/Other Comments:  · Patient continues to require skilled intervention due to having difficulty walking and requires further PT to advance exercises for balance, strengthening, and gait. TREATMENT:   (In addition to Assessment/Re-Assessment sessions the following treatments were rendered)    Therapeutic Exercise: (see flow sheet below for minutes) ):  Exercises per grid below to improve mobility and strength. Required moderate visual, verbal and tactile cues to promote proper body alignment. Aquatic Therapy (see flow sheet below for minutes): Aquatic treatment performed per flow grid for Decreased muscle strength, Decreased static/dynamic balance and reactive control, Decreased range of motion and Ease of movement. Cues provided for technique and posture. Assistance by therapist provided for balance. Patient has difficulty with balance. Date: 5/23/17 6/6/17 6/9/17 6/12/17 6/15/17    Modalities:  Defibrillator/Pacemaker                           Manual Therapy:                           Aquatic Exercise: With CGA/SBA of thereapist in water c pt  60 mins  1.5# 45 min 1.5# 55 min 1.5 # 55 min    Pt to use lift chair for in/out of pool         Gait F/B/S/M  With Gait Belt  Forward  CGA 20 mins total c rest breaks [id number]  X2L March  Side step 10ft to L   CGA/Ever c rest breaks X6L Forward 4ft CGA    X2L Forward 3 ft Min A X4 L forward 3.5ft  X2L forward 4.5ft  X1L March 4.5ft    Gait belt and CGA/Ever    4-way hip  4x5 X10 B X10 B X15 B    PF/DF  x20 X15 X10 X15    Hamstring Curls     X10 B    Hip Flexion with knee ext.   (rockette)  LAQ sitting in lift chair LAQ sitting in lift chair X15 BLE  LAQ sitting in lift chair BLE    Squats     X15 CGA X15 X15    SLR march         Core: Core:         Deep well bike         Deep well jumping jelena         Deep well scissors         Deep well:  traction         Core: Seated rows    X10 c open paddles X15 c open paadles    Core: Seated abd/add    X10 c open paddles X15 c open paddles    Seated hip abd/add    Yellow ball squeeze X10H5  Manual resisance abd X10H5     Seated leg press    Manual resistance X10 B X15 manual resistance     Seated Marches       X15 B x15 B    Standing Marches     X15 B    Seated LAQ    X15 B     Hamstring Stretch         Piriformis stretch         PF Stretch         Balance: Single leg Stance         Balance: Tandem                  Therapist accompanied pt to car, c CGA during amb gait and SBA for getting in car    done       Therapeutic Exercise: 8 minutes        Supine hip abduction x15        bridging x15        Heel/toe raises x5                 F=forward  B=Backward  S=sidesteps  M=marches    H=hold    Manual: (see flow sheet above for minutes) ---  Modalities: (see flow sheet above for minutes) ---    HEP: discussed current home health PT HEP and will advance as appropriate in the future. Treatment/Session Assessment:  Pt's daughter accompanied her to therapy session. Pt amb into clinic using front-wheeled rolling walker demonstrating decreased yayo and decreased step length R>L. Moiz Chong Pt entered and exited pool in chair lift, needing CGA/Ever in standing and lap exercises. Pt needed tactile cues for poper execution of LAQ and seated marches. Pt amb in the water needing minimal verbal cues for forward gaze and abdominal bracing. Pt c/o minimal increase in R hip discomfort/soreness. Will continue aquatic next visit per patient tolerance. · Pain/ Symptoms: Initial:   2/10 R hip     Patient reports feeling ok but having some pain. Post Session:  2/10 ·     · Compliance with Program/Exercises: Will assess as treatment progresses. · Recommendations/Intent for next treatment session:  \"Next visit will continue aquatics\". Pt will require CGA/SBA from therapist in the pool, due to global weakness and decreased for balance; for safety of patient.    ·   Total Treatment Duration:  PT Patient Time In/Time Out  Time In: 1005  Time Out: [address]

## 2017-06-22 ENCOUNTER — HOSPITAL ENCOUNTER (OUTPATIENT)
Dept: PHYSICAL THERAPY | Age: 82
Discharge: HOME | End: 2017-06-22
Payer: MEDICARE

## 2017-06-23 ENCOUNTER — HOSPITAL ENCOUNTER (OUTPATIENT)
Dept: PHYSICAL THERAPY | Age: 82
Discharge: HOME | End: 2017-06-23
Payer: MEDICARE

## 2017-06-23 PROCEDURE — G8978 MOBILITY CURRENT STATUS: HCPCS

## 2017-06-23 PROCEDURE — G8979 MOBILITY GOAL STATUS: HCPCS

## 2017-06-23 PROCEDURE — 97113 AQUATIC THERAPY/EXERCISES: CPT

## 2017-06-23 NOTE — PROGRESS NOTES
Víctor Bradley  : 1928 [id number] [facility] [address]  [address] [place] [address].  [phone]   [phone]        OUTPATIENT PHYSICAL THERAPY:Daily Note and Progress Report 2017      ICD-10: Treatment Diagnosis: Pain in right hip (M25.551)  Difficulty in walking, not elsewhere classified (R26.2)  Precautions/Allergies:   Celebrex [celecoxib] and Other medication   Fall Risk Score: 6 (? 5 = High Risk)  MD Orders: s/p Right IT Fracture: Strengthen Hip abductors and quad, gait training WBAT MEDICAL/REFERRING DIAGNOSIS:  Hip fracture, right ([address] [S72.001A]   DATE OF ONSET: surgery 17  REFERRING PHYSICIAN: Neno Bullock MD  RETURN PHYSICIAN APPOINTMENT: 2017 pt did not recall the date at eval     INITIAL ASSESSMENT:  Ms. Osorio Avalos presents with R hip pain and difficulty walking s/p ORIF for IT fracture. Pt has very poor balance and requires rolling walker for gait. Pt has decreased LE strength weaker with R hip and decreased ROM with R hip and knee. Pt will benefit from PT for strengthening, ROM, balance and gait training. Pt has increased difficulty with transfers and requires some assist with ADLs. Pt will benefit from beginning in aquatic environment for ease of movement and safety with challenging pt's balance and then will progress towards further land based strengthening, balance, and gait training. PROBLEM LIST (Impacting functional limitations):  1. Decreased Strength  2. Decreased ADL/Functional Activities  3. Decreased Transfer Abilities  4. Decreased Ambulation Ability/Technique  5. Decreased Balance  6. Increased Pain  7. Decreased Activity Tolerance  8. Decreased Flexibility/Joint Mobility  9. Decreased [place] with Home Exercise Program INTERVENTIONS PLANNED:  1. Balance Exercise  2. Cold  3. Gait Training  4. Heat  5. Home Exercise Program (HEP)  6. Manual Therapy  7. Neuromuscular Re-education/Strengthening  8.  Range of Motion (ROM)  9. Therapeutic Activites  10. Therapeutic Exercise/Strengthening  11. Transfer Training  12. aquatics   TREATMENT PLAN:  Effective Dates: 5/23/17 TO 8/15/17. Frequency/Duration: 2-3 times a week for 12 weeks  GOALS: (Goals have been discussed and agreed upon with patient.)  Short-Term Functional Goals: Time Frame: 4 weeks  1. Pt will independent with HEP. (in progress 6/23/2017)  2. Pt will increase R hip at least 10 ° in each plane for increased mobility with dressing. (MET 6/23/2017)  3. Pt will be able to participate in aquatic PT for at least 45 minutes without increased symptoms to improve mobility, gait, and strength. (MET 6/23/2017)  4. Pt will be able to stand and balance for at least 30 seconds without her walker. (in progress 6/23/2017)  5. Pt will be able to safely navigate up/down a curb with walker with SBA. (in progress 6/23/2017)  Discharge Goals: Time Frame: 12 weeks  1. Pt will be able to ambulate at least 600 ft with rolling walker safely. (in progress 6/23/2017)  2. Pt will be able to navigate up/down at least 4 stairs with railing safely. (in progress 6/23/2017)  3. Pt will be able to increase BLE strength at least 4/5 or greater for increased functional strength for squatting, gait, and stairs. (in progress 6/23/2017)  4. Pt will be able to  romberg stance for at least 30 seconds noting increased balance. (in progress 6/23/2017)  5. Pt will be able to reach overhead with both UEs while standing without UE support as if reaching into a cabinet without loss of balance. (in progress 6/23/2017)  Rehabilitation Potential For Stated Goals: Excellent                  HISTORY:   History of Present Injury/Illness (Reason for Referral):  Pt reports falling for unknown reason in her bathroom Sat Feb 18 and causing R hip IT fracture. Pt underwent ORIF Sun Feb 20, 2017. Pt was discharged to the [facility] around the 2/23/17 and went home around mid April.   Pt then had home health PT and was discharged to come to outpatient. Pt would like to be able to regain balance and strength to walk better. Pt states she is walking around her home but coming into PT today was the largest distance she has walked out of her home. Past Medical History/Comorbidities:   Ms. Brett Lancaster  has a past medical history of Anemia of other chronic disease (4/26/2014); Anxiety; Arrhythmia; CAD (coronary artery disease) (20O7); Chronic systolic heart failure ([facility] Coagulation disorder ([facility] Congestive heart failure ([facility] [facility] (08/2007); Contusion of chest wall (4/29/2012); Coronary artery disease involving autologous vein coronary bypass graft without angina pectoris (6/5/2015); Falls (4/23/2014); Generalized OA (6/5/2015); Heart failure ([facility] [facility] Hip pain (4/26/2014); History of skin cancer (1992); [place] (hard of hearing); Hygroma (4/23/2014); Hyperglycemia (4/29/2012); Hyperlipidemia; Hypertension; Hypotension (4/23/2014); Mild dementia (12/3/2014); Nausea & vomiting; Neutrophilic leukocytosis (9/39/2790); Osteoarthritis; Osteoporosis; Osteoporosis (12/3/2014); Postmenopausal (12/3/2014); Postmenopausal bone loss; S/P cardiac pacemaker procedure (1/2011); S/P implantation of automatic cardioverter/defibrillator (AICD) (4/29/2012); S/P placement of cardiac pacemaker (4/29/2012); Shoulder fracture (1991); and Syncope (4/29/2012). Ms. Brett Lancaster  has a past surgical history that includes biopsy of skin lesion (1992); bladder suspension (1995?); juan and bso (1984); tonsillectomy (as a child); heart catheterization (2007); shoulder arthroscopy (1991); pacemaker (1/13/2011); and ptca (08/2007).   Social History/Living Environment:    lives with daughter in one story home with ramp  Prior Level of Function/Work/Activity:  Independent with ADLs, Used walker intermittently prior to fall; had assist of daughter for cooking and cleaning  Current Medications:    Current Outpatient Prescriptions:     gabapentin (NEURONTIN) 100 mg capsule, TAKE ONE CAPSULE BY MOUTH AT BEDTIME, Disp: , Rfl: 0    nitroglycerin (NITROSTAT) 0.4 mg SL tablet, 1 Tab by SubLINGual route every five (5) minutes as needed. , Disp: 25 Tab, Rfl: 3    furosemide (LASIX) 20 mg tablet, One tab daily as needed, Disp: 90 Tab, Rfl: 2    carvedilol (COREG) 3.125 mg tablet, Take 1 Tab by mouth two (2) times daily (with meals). , Disp: 60 Tab, Rfl: 5    docusate sodium (COLACE) 100 mg capsule, Take 1 Cap by mouth two (2) times a day. Indications: prevent constipation, Disp: 60 Cap, Rfl: 0    atorvastatin (LIPITOR) 10 mg tablet, TAKE ONE TABLET BY MOUTH ONE TIME DAILY, Disp: 30 Tab, Rfl: 5    amiodarone (CORDARONE) 200 mg tablet, TAKE ONE-HALF TABLET BY MOUTH ONE TIME DAILY, Disp: 15 Tab, Rfl: 5    clopidogrel (PLAVIX) 75 mg tab, TAKE ONE TABLET BY MOUTH ONE TIME DAILY, Disp: 30 Tab, Rfl: 5    calcium-vitamin D (OYSTER SHELL) 500 mg(1,250mg) -200 unit per tablet, Take 1 Tab by mouth three (3) times daily (with meals). , Disp: 90 Tab, Rfl: 0    LORazepam (ATIVAN) 1 mg tablet, Take 1 Tab by mouth every twelve (12) hours as needed for Anxiety. Max Daily Amount: 2 mg., Disp: 14 Tab, Rfl: 0    diclofenac (VOLTAREN) 1 % gel, Apply  to affected area four (4) times daily. , Disp: 100 g, Rfl: 3    albuterol (PROVENTIL HFA, VENTOLIN HFA, PROAIR HFA) 90 mcg/actuation inhaler, Take 1 Puff by inhalation every four (4) hours as needed for Wheezing., Disp: 1 Inhaler, Rfl: 1    ERGOCALCIFEROL, VITAMIN D2, (VITAMIN D2 PO), Take  by mouth., Disp: , Rfl:     aspirin (ASPIRIN) 325 mg tablet, Take 81 mg by mouth every morning. Hold until after surgery, Disp: , Rfl:    Date Last Reviewed:  6/23/2017   EXAMINATION:   Observation/Orthostatic Postural Assessment:           Forward flexed posture with rounded shoulders and forward head posture  Palpation:          Minimal tenderness R lateral hip with only minor scar tissue noted over incision; incision healed well  ROM:       Date:  5/23/17 Date:  06/23/17 Date:     LE ROM Left Right       Hip Flexion 120 ° 100 ° 120 ° 110 ° AAROM     Hip Abduction 20 ° 10 ° 20 ° 20 °     Straight Leg Raise (SLR)  65 °                Knee Flexion 140 ° 130 ° WFL WFL     Knee Extension 0 ° 0 ° [facility]       Strength:     Date:  5/23/17 Date:  06/23/17 Date:     LE MMT Left Right Left Right Left Right   Hip Flex (L1/L2) 4/5 3-/5  3/5     Hip Abd (glut med) 4-/5 2+/5  3-/5     Hip Ext  Not tested       Quad    ( L3/4) 4/5 4-/5  4-/5     Hamstring 4/5 4-/5  4-/5     Anterior Tib (L4/L5) 3/5 3-/5  3/5     Gastroc (S1/2) 3-/5 2+/5  3/5       Special Tests: deferred  Neurological Screen:        Sensation: intact to light touch but reports intermittent numbness/tingling bilateral feet  Functional Mobility:         Gait/Ambulation:  Using rolling walker with SBA to mod I with step through gait-as fatigue begins to shuffle        Transfers:  modified independent with transfer sit to stand from standard chair        Bed Mobility:  Increased effort and time but independent   Balance:          Poor; unable to let go of the walker during standing   Body Structures Involved:  1. Nerves  2. Bones  3. Joints  4. Muscles  5. Ligaments Body Functions Affected:  1. Sensory/Pain  2. Neuromusculoskeletal  3. Movement Related Activities and Participation Affected:  1. General Tasks and Demands  2. Mobility  3. Self Care  4. Domestic Life  5. Community, Social and Civic Life   CLINICAL PRESENTATION:   CLINICAL DECISION MAKING:   Outcome Measure: Tool Used: Lower Extremity Functional Scale (LEFS)  Score:  Initial: 12/80  Most Recent: 15/80 (Date: 06/23/17 )   Interpretation of Score: 20 questions each scored on a 5 point scale with 0 representing \"extreme difficulty or unable to perform\" and 4 representing \"no difficulty\". The lower the score, the greater the functional disability. 80/80 represents no disability. Minimal detectable change is 9 points.   Score 80 79-63 62-48 47-32 31-16 15-1 0   Modifier CH CI CJ CK CL CM CN     ? Mobility - Walking and Moving Around:     - CURRENT STATUS: CM - 80%-99% impaired, limited or restricted    - GOAL STATUS: CK - 40%-59% impaired, limited or restricted    - D/C STATUS:  ---------------To be determined---------------    Medical Necessity:   · Patient is expected to demonstrate progress in strength, range of motion, balance and functional technique to decrease assistance required with gait and ADLs and improve safety during gait. Reason for Services/Other Comments:  · Patient continues to require skilled intervention due to having difficulty walking and requires further PT to advance exercises for balance, strengthening, and gait. TREATMENT:   (In addition to Assessment/Re-Assessment sessions the following treatments were rendered)    Therapeutic Exercise: (see flow sheet below for minutes) ):  Exercises per grid below to improve mobility and strength. Required moderate visual, verbal and tactile cues to promote proper body alignment. Aquatic Therapy (see flow sheet below for minutes): Aquatic treatment performed per flow grid for Decreased muscle strength, Decreased static/dynamic balance and reactive control, Decreased range of motion and Ease of movement. Cues provided for technique and posture. Assistance by therapist provided for balance. Patient has difficulty with balance. Date: 5/23/17 6/6/17 6/9/17 6/12/17 6/15/17 06/23/17   Modalities:  Defibrillator/Pacemaker                           Manual Therapy:                           Aquatic Exercise:   With CGA/SBA of therapist in water c pt  60 mins  1.5# 45 min 1.5# 55 min 1.5 # 55 min 1.5# 60 min   Pt to use lift chair for in/out of pool         Gait F/B/S/M  With Gait Belt  Forward  CGA 20 mins total c rest breaks [id number]  X2L March  Side step 10ft to L   CGA/Ever c rest breaks X6L Forward 4ft CGA    X2L Forward 3 ft Min A X4 L forward 3.5ft  X2L forward 4.5ft  X1L March 4.5ft    Gait belt and CGA/Ever X2L F/B/S/M with aquatic walker   4-way hip  4x5 X10 B X10 B X15 B X15 BLE at edge of pool   PF/DF  x20 X15 X10 X15 x15   Hamstring Curls     X10 B    Hip Flexion with knee ext. (rockette)  LAQ sitting in lift chair LAQ sitting in lift chair X15 BLE  LAQ sitting in lift chair BLE    Squats     X15 CGA X15 X15    SLR march         Core:           Core:         Deep well bike         Deep well jumping jelena         Deep well scissors         Deep well:  traction         Core: Seated rows    X10 c open paddles X15 c open paddles X15 with hydro bells   Core: Seated horizontal ad/Abd      X15 with hydro bells   Core: Seated abd/add    X10 c open paddles X15 c open paddles X15 with hydro bells   Seated hip abd/add    Yellow ball squeeze X10H5  Manual resistance abd X10H5  X15H3 manual resistance/ using add ball   Seated leg press    Manual resistance X10 B X15 manual resistance  same   Seated Marches       X15 B x15 B X20 BLE   Standing Marches     X15 B    Seated LAQ    X15 B  X20 BLE   Hamstring Stretch         Piriformis stretch         PF Stretch         Balance: Single leg Stance         Balance: Tandem                  Therapist accompanied pt to car, c CGA during amb gait and SBA for getting in car    done       Therapeutic Exercise: 8 minutes        Supine hip abduction x15        bridging x15        Heel/toe raises x5                 F=forward  B=Backward  S=sidesteps  M=marches    H=hold    Manual: (see flow sheet above for minutes) ---  Modalities: (see flow sheet above for minutes) ---    HEP: discussed current home health PT HEP and will advance as appropriate in the future. Treatment/Session Assessment:  Pt amb into clinic using front-wheeled rolling walker demonstrating decreased yayo and crouched gait- with cues for posture pt improves. .  Pt entered and exited pool in chair lift, needing CGA/Ever in standing and lap exercises.    Pt needed tactile cues for proper execution of LAQ and seated marches. Pt amb in  the water needing minimal verbal cues with use of aquatic walker. Pt c/o minimal increase in R hip discomfort/soreness. Will continue aquatic next visit per patient tolerance. Plan to continue address STG and LTG in aquatic setting. · Pain/ Symptoms: Initial:   2/10 R hip     Patient reports feeling ok but having some pain. Post Session:  2/10 ·     · Compliance with Program/Exercises: Will assess as treatment progresses. · Recommendations/Intent for next treatment session: \"Next visit will continue aquatics\". Pt will require CGA/SBA from therapist in the pool, due to global weakness and decreased for balance; for safety of patient.    ·   Total Treatment Duration:  PT Patient Time In/Time Out  Time In: 1415  Time Out: Darren 11, PT

## 2017-06-27 ENCOUNTER — HOSPITAL ENCOUNTER (OUTPATIENT)
Dept: PHYSICAL THERAPY | Age: 82
Discharge: HOME | End: 2017-06-27
Payer: MEDICARE

## 2017-06-27 PROCEDURE — 97113 AQUATIC THERAPY/EXERCISES: CPT | Performed by: PHYSICAL THERAPIST

## 2017-06-29 ENCOUNTER — HOSPITAL ENCOUNTER (OUTPATIENT)
Dept: PHYSICAL THERAPY | Age: 82
Discharge: HOME | End: 2017-06-29
Payer: MEDICARE

## 2017-06-29 PROCEDURE — 97113 AQUATIC THERAPY/EXERCISES: CPT | Performed by: PHYSICAL THERAPIST

## 2017-06-29 NOTE — PROGRESS NOTES
Holly Lipoma  : 1928 [id number] [facility],[address]  [address] [place] [address].  [phone]   [phone]        OUTPATIENT PHYSICAL THERAPY:Daily Note 2017      ICD-10: Treatment Diagnosis: Pain in right hip (M25.551)  Difficulty in walking, not elsewhere classified (R26.2)  Precautions/Allergies:   Celebrex [celecoxib] and Other medication   Fall Risk Score: 6 (? 5 = High Risk)  MD Orders: s/p Right IT Fracture: Strengthen Hip abductors and quad, gait training WBAT MEDICAL/REFERRING DIAGNOSIS:  Hip fracture, right ([facility] [address] [S72.001A]   DATE OF ONSET: surgery 17  REFERRING PHYSICIAN: Juanis Jade MD  RETURN PHYSICIAN APPOINTMENT: Aug 2017     INITIAL ASSESSMENT:  Ms. Eric Bates presents with R hip pain and difficulty walking s/p ORIF for IT fracture. Pt has very poor balance and requires rolling walker for gait. Pt has decreased LE strength weaker with R hip and decreased ROM with R hip and knee. Pt will benefit from PT for strengthening, ROM, balance and gait training. Pt has increased difficulty with transfers and requires some assist with ADLs. Pt will benefit from beginning in aquatic environment for ease of movement and safety with challenging pt's balance and then will progress towards further land based strengthening, balance, and gait training. PROBLEM LIST (Impacting functional limitations):  1. Decreased Strength  2. Decreased ADL/Functional Activities  3. Decreased Transfer Abilities  4. Decreased Ambulation Ability/Technique  5. Decreased Balance  6. Increased Pain  7. Decreased Activity Tolerance  8. Decreased Flexibility/Joint Mobility  9. Decreased Flushing with Home Exercise Program INTERVENTIONS PLANNED:  1. Balance Exercise  2. Cold  3. Gait Training  4. Heat  5. Home Exercise Program (HEP)  6. Manual Therapy  7. Neuromuscular Re-education/Strengthening  8. Range of Motion (ROM)  9.  Therapeutic Activites  10. Therapeutic Exercise/Strengthening  11. Transfer Training  12. aquatics   TREATMENT PLAN:  Effective Dates: 5/23/17 TO 8/15/17. Frequency/Duration: 2-3 times a week for 12 weeks  GOALS: (Goals have been discussed and agreed upon with patient.)  Short-Term Functional Goals: Time Frame: 4 weeks  1. Pt will independent with HEP. (in progress 6/29/2017)  2. Pt will increase R hip at least 10 ° in each plane for increased mobility with dressing. (MET 6/29/2017)  3. Pt will be able to participate in aquatic PT for at least 45 minutes without increased symptoms to improve mobility, gait, and strength. (MET 6/29/2017)  4. Pt will be able to stand and balance for at least 30 seconds without her walker. (in progress 6/29/2017)  5. Pt will be able to safely navigate up/down a curb with walker with SBA. (in progress 6/29/2017)  Discharge Goals: Time Frame: 12 weeks  1. Pt will be able to ambulate at least 600 ft with rolling walker safely. (in progress 6/29/2017)  2. Pt will be able to navigate up/down at least 4 stairs with railing safely. (in progress 6/23/2017)  3. Pt will be able to increase BLE strength at least 4/5 or greater for increased functional strength for squatting, gait, and stairs. (in progress 6/23/2017)  4. Pt will be able to  romberg stance for at least 30 seconds noting increased balance. (in progress 6/23/2017)  5. Pt will be able to reach overhead with both UEs while standing without UE support as if reaching into a cabinet without loss of balance. (in progress 6/23/2017)  Rehabilitation Potential For Stated Goals: Excellent                  HISTORY:   History of Present Injury/Illness (Reason for Referral):  Pt reports falling for unknown reason in her bathroom Sat Feb 18 and causing R hip IT fracture. Pt underwent ORIF Sun Feb 20, 2017. Pt was [facility] to the [facility] around the 2/23/17 and went home around mid April.   Pt then had home health PT and was discharged to come to outpatient. Pt would like to be able to regain balance and strength to walk better. Pt states she is walking around her home but coming into PT today was the largest distance she has walked out of her home. Past Medical History/Comorbidities:   Ms. Katherine Rincon  has a past medical history of Anemia of other chronic disease (4/26/2014); Anxiety; Arrhythmia; CAD (coronary artery disease) (20O7); Chronic systolic heart failure ([facility] [address] Coagulation disorder ([facility] [address] Congestive heart failure ([facility] [address] (08/2007); Contusion of chest wall (4/29/2012); Coronary artery disease involving autologous vein coronary bypass graft without angina pectoris (6/5/2015); Falls (4/23/2014); Generalized OA (6/5/2015); Heart failure ([facility] [address] Hip pain (4/26/2014); History of skin cancer (1992); [place] (hard of hearing); Hygroma (4/23/2014); Hyperglycemia (4/29/2012); Hyperlipidemia; Hypertension; Hypotension (4/23/2014); Mild dementia (12/3/2014); Nausea & vomiting; Neutrophilic leukocytosis (2/58/4360); Osteoarthritis; Osteoporosis; Osteoporosis (12/3/2014); Postmenopausal (12/3/2014); Postmenopausal bone loss; S/P cardiac pacemaker procedure (1/2011); S/P implantation of automatic cardioverter/defibrillator (AICD) (4/29/2012); S/P placement of cardiac pacemaker (4/29/2012); Shoulder fracture (1991); and Syncope (4/29/2012). Ms. Katherine Rincon  has a past surgical history that includes biopsy of skin lesion (1992); bladder suspension (1995?); juan and bso (1984); tonsillectomy (as a child); heart catheterization (2007); shoulder arthroscopy (1991); pacemaker (1/13/2011); and ptca (08/2007).   Social History/Living Environment:    lives with daughter in one story home with ramp  Prior Level of Function/Work/Activity:  Independent with ADLs, Used walker intermittently prior to fall; had assist of daughter for cooking and cleaning  Current Medications:    Current Outpatient Prescriptions:     gabapentin (NEURONTIN) 100 mg capsule, TAKE ONE CAPSULE BY MOUTH AT BEDTIME, Disp: , Rfl: 0    nitroglycerin (NITROSTAT) 0.4 mg SL tablet, 1 Tab by SubLINGual route every five (5) minutes as needed. , Disp: 25 Tab, Rfl: 3    furosemide (LASIX) 20 mg tablet, One tab daily as needed, Disp: 90 Tab, Rfl: 2    carvedilol (COREG) 3.125 mg tablet, Take 1 Tab by mouth two (2) times daily (with meals). , Disp: 60 Tab, Rfl: 5    docusate sodium (COLACE) 100 mg capsule, Take 1 Cap by mouth two (2) times a day. Indications: prevent constipation, Disp: 60 Cap, Rfl: 0    atorvastatin (LIPITOR) 10 mg tablet, TAKE ONE TABLET BY MOUTH ONE TIME DAILY, Disp: 30 Tab, Rfl: 5    amiodarone (CORDARONE) 200 mg tablet, TAKE ONE-HALF TABLET BY MOUTH ONE TIME DAILY, Disp: 15 Tab, Rfl: 5    clopidogrel (PLAVIX) 75 mg tab, TAKE ONE TABLET BY MOUTH ONE TIME DAILY, Disp: 30 Tab, Rfl: 5    calcium-vitamin D (OYSTER SHELL) 500 mg(1,250mg) -200 unit per tablet, Take 1 Tab by mouth three (3) times daily (with meals). , Disp: 90 Tab, Rfl: 0    LORazepam (ATIVAN) 1 mg tablet, Take 1 Tab by mouth every twelve (12) hours as needed for Anxiety. Max Daily Amount: 2 mg., Disp: 14 Tab, Rfl: 0    diclofenac (VOLTAREN) 1 % gel, Apply  to affected area four (4) times daily. , Disp: 100 g, Rfl: 3    albuterol (PROVENTIL HFA, VENTOLIN HFA, PROAIR HFA) 90 mcg/actuation inhaler, Take 1 Puff by inhalation every four (4) hours as needed for Wheezing., Disp: 1 Inhaler, Rfl: 1    ERGOCALCIFEROL, VITAMIN D2, (VITAMIN D2 PO), Take  by mouth., Disp: , Rfl:     aspirin (ASPIRIN) 325 mg tablet, Take 81 mg by mouth every morning. Hold until after surgery, Disp: , Rfl:    Date Last Reviewed:  6/29/2017   EXAMINATION:   Observation/Orthostatic Postural Assessment:           Forward flexed posture with rounded shoulders and forward head posture  Palpation:          Minimal tenderness R lateral hip with only minor scar tissue noted over incision; incision healed well  ROM:       Date:  5/23/17 Date:  06/23/17 Date:     LE ROM Left Right       Hip Flexion 120 ° 100 ° 120 ° 110 ° [facility]     Hip Abduction 20 ° 10 ° 20 ° 20 °     Straight Leg Raise (SLR)  65 °                Knee Flexion 140 ° 130 ° [facility]     Knee Extension 0 ° 0 ° [facility]       Strength:     Date:  5/23/17 Date:  06/23/17 Date:     LE MMT Left Right Left Right Left Right   Hip Flex (L1/L2) 4/5 3-/5  3/5     Hip Abd (glut med) 4-/5 2+/5  3-/5     Hip Ext  Not tested       Quad    ( L3/4) 4/5 4-/5  4-/5     Hamstring 4/5 4-/5  4-/5     Anterior Tib (L4/L5) 3/5 3-/5  3/5     Gastroc (S1/2) 3-/5 2+/5  3/5       Special Tests: deferred  Neurological Screen:        Sensation: intact to light touch but reports intermittent numbness/tingling bilateral feet  Functional Mobility:         Gait/Ambulation:  Using rolling walker with SBA to mod I with step through gait-as fatigue begins to shuffle        Transfers:  modified independent with transfer sit to stand from standard chair        Bed Mobility:  Increased effort and time but independent   Balance:          Poor; unable to let go of the walker during standing   Body Structures Involved:  1. Nerves  2. Bones  3. Joints  4. Muscles  5. Ligaments Body Functions Affected:  1. Sensory/Pain  2. Neuromusculoskeletal  3. Movement Related Activities and Participation Affected:  1. General Tasks and Demands  2. Mobility  3. Self Care  4. Domestic Life  5. Community, Social and Civic Life   CLINICAL PRESENTATION:   CLINICAL DECISION MAKING:   Outcome Measure: Tool Used: Lower Extremity Functional Scale (LEFS)  Score:  Initial: 12/80  Most Recent: 15/80 (Date: 06/23/17 )   Interpretation of Score: 20 questions each scored on a 5 point scale with 0 representing \"extreme difficulty or unable to perform\" and 4 representing \"no difficulty\". The lower the score, the greater the functional disability. 80/80 represents no disability. Minimal detectable change is 9 points. Score 80 79-63 62-48 47-32 31-16 15-1 0   Modifier CH CI CJ CK CL CM CN     ?  Mobility - Walking and Moving Around:     - CURRENT STATUS: CM - 80%-99% impaired, limited or restricted    - GOAL STATUS: CK - 40%-59% impaired, limited or restricted    - D/C STATUS:  ---------------To be determined---------------    Medical Necessity:   · Patient is expected to demonstrate progress in strength, range of motion, balance and functional technique to decrease assistance required with gait and ADLs and improve safety during gait. Reason for Services/Other Comments:  · Patient continues to require skilled intervention due to having difficulty walking and requires further PT to advance exercises for balance, strengthening, and gait. TREATMENT:   (In addition to Assessment/Re-Assessment sessions the following treatments were rendered)    Therapeutic Exercise: (see flow sheet below for minutes) ):  Exercises per grid below to improve mobility and strength. Required moderate visual, verbal and tactile cues to promote proper body alignment. Aquatic Therapy (see flow sheet below for minutes): Aquatic treatment performed per flow grid for Decreased muscle strength, Decreased static/dynamic balance and reactive control, Decreased range of motion and Ease of movement. Cues provided for technique and posture. Assistance by therapist provided for balance. Patient has difficulty with balance. Date: 5/23/17 6/6/17 6/9/17 6/12/17 6/15/17 06/23/17 6/27/17 6/29/17   Modalities:  Defibrillator/Pacemaker                                 Manual Therapy:                                 Aquatic Exercise:   With CGA/SBA of therapist in water c pt  60 mins  1.5# 45 min 1.5# 55 min 1.5 # 55 min 1.5# 60 min 1.5#/40 minutes 1.5#/40 minutes   Pt to use lift chair for in/out of pool           Gait F/B/S/M  With Gait Belt  Forward  CGA 20 mins total c rest breaks [id number]  X2L March  Side step 10ft to L   CGA/Ever c rest breaks X6L Forward 4ft CGA    X2L Forward 3 ft Min A X4 L forward 3.5ft  X2L forward 4.5ft  X1L March 4.5ft    Gait belt and CGA/Ever X2L F/B/S/M with aquatic walker x4 L each with aquatic walker x4 L    4-way hip  4x5 X10 B X10 B X15 B X15 BLE at edge of pool x20 BLE x20 BLE   PF/DF  x20 X15 X10 X15 x15 x20 x25   Hamstring Curls     X10 B      Hip Flexion with knee ext.   (lee)  LAQ sitting in lift chair LAQ sitting in lift chair X15 BLE  LAQ sitting in lift chair BLE  x10 BLE x15 BLE   Squats     X15 CGA X15 X15  x20 x25   SLR march           Core:             Core:           Deep well bike           Deep well jumping jelena           Deep well scissors           Deep well:  traction           Core: Seated rows    X10 c open paddles X15 c open paddles X15 with hydro bells x15 open paddles x15 closed paddles   Core: Seated horizontal ad/Abd      X15 with hydro bells x15 x15 closed paddles   Core: Seated abd/add    X10 c open paddles X15 c open paddles X15 with hydro bells x15    Seated hip abd/add    Yellow ball squeeze X10H5  Manual resistance abd X10H5  X15H3 manual resistance/ using add ball     Seated leg press    Manual resistance X10 B X15 manual resistance  same     Seated Marches       X15 B x15 B X20 BLE x20 BLE x20 BLE   Standing Marches     X15 B      Seated LAQ    X15 B  X20 BLE     Hamstring Stretch           Piriformis stretch           PF Stretch           Balance: standing without use of rail        3 x 30 seconds with mild perturbations of water 3 x 1 minute with mild perturbations with one round   Balance: standing with UE reaching overhead        2x10 2x5   Step-ups        x10 RLE   Balance with turning body to R/L        x5 reps each   Therapist accompanied pt to car, c CGA during amb gait and SBA for getting in car    done         Therapeutic Exercise: 8 minutes          Supine hip abduction x15          bridging x15          Heel/toe raises x5                     F=forward  B=Backward  S=sidesteps  M=marches    H=hold    Manual: (see flow sheet above for minutes) ---  Modalities: (see flow sheet above for minutes) ---    HEP: discussed current home health PT HEP and will advance as appropriate in the future. Treatment/Session Assessment:  Pt arrived 15 minutes late today and reminded her daughter of appointment time. Continued to focus on balance and LE strengthening with aquatics. Added step-ups in aquatic environment and no c/o pain; however, pt struggles with step-ups bilaterally but notably weaker on her R.  Pt did not seem to have any c/o pain with aquatics so plan to transition to land for further balance, strengthening, and gait training. Plan to stay with land exercises unless pt begins to have increased pain. Pt still needs lots of verbal, visual, and tactile cues for correct form. · Pain/ Symptoms: Initial:   0/10 R hip     Patient reports intermittent R hip pain but no current c/o. Post Session:  0/10     · Compliance with Program/Exercises: partial   · Recommendations/Intent for next treatment session: \"Next visit will continue to focus on gait, balance, and strengthening of LEs\". Transition to land exercises.      Total Treatment Duration:  PT Patient Time In/Time Out  Time In: 1345  Time Out: 1425     Nahomi Gallegos, PT

## 2017-07-03 ENCOUNTER — HOSPITAL ENCOUNTER (OUTPATIENT)
Dept: PHYSICAL THERAPY | Age: 82
Discharge: HOME | End: 2017-07-03
Payer: MEDICARE

## 2017-07-03 PROCEDURE — 97110 THERAPEUTIC EXERCISES: CPT

## 2017-07-03 NOTE — PROGRESS NOTES
Holly Lipoma  : [id number] [facility],[address] @ [address]  [address] [place] [address].  [phone]   [phone]        OUTPATIENT PHYSICAL THERAPY:Daily Note 7/3/2017      ICD-10: Treatment Diagnosis: Pain in right hip (M25.551)  Difficulty in walking, not elsewhere classified (R26.2)  Precautions/Allergies:   Celebrex [celecoxib] and Other medication   Fall Risk Score: 6 (? 5 = High Risk)  MD Orders: s/p Right IT Fracture: Strengthen Hip abductors and quad, gait training WBAT MEDICAL/REFERRING DIAGNOSIS:  Hip fracture, right ([address] [S72.001A]   DATE OF ONSET: surgery 17  REFERRING PHYSICIAN: Juanis Jade MD  RETURN PHYSICIAN APPOINTMENT: Aug 2017     INITIAL ASSESSMENT:  Ms. Eric Bates presents with R hip pain and difficulty walking s/p ORIF for IT fracture. Pt has very poor balance and requires rolling walker for gait. Pt has decreased LE strength weaker with R hip and decreased ROM with R hip and knee. Pt will benefit from PT for strengthening, ROM, balance and gait training. Pt has increased difficulty with transfers and requires some assist with ADLs. Pt will benefit from beginning in aquatic environment for ease of movement and safety with challenging pt's balance and then will progress towards further land based strengthening, balance, and gait training. PROBLEM LIST (Impacting functional limitations):  1. Decreased Strength  2. Decreased ADL/Functional Activities  3. Decreased Transfer Abilities  4. Decreased Ambulation Ability/Technique  5. Decreased Balance  6. Increased Pain  7. Decreased Activity Tolerance  8. Decreased Flexibility/Joint Mobility  9. Decreased [place] with Home Exercise Program INTERVENTIONS PLANNED:  1. Balance Exercise  2. Cold  3. Gait Training  4. Heat  5. Home Exercise Program (HEP)  6. Manual Therapy  7. Neuromuscular Re-education/Strengthening  8. Range of Motion (ROM)  9. Therapeutic Activites  10.  Therapeutic Exercise/Strengthening  11. Transfer Training  12. aquatics   TREATMENT PLAN:  Effective Dates: 5/23/17 TO 8/15/17. Frequency/Duration: 2-3 times a week for 12 weeks  GOALS: (Goals have been discussed and agreed upon with patient.)  Short-Term Functional Goals: Time Frame: 4 weeks  1. Pt will independent with HEP. (in progress 7/3/2017)  2. Pt will increase R hip at least 10 ° in each plane for increased mobility with dressing. (MET 7/3/2017)  3. Pt will be able to participate in aquatic PT for at least 45 minutes without increased symptoms to improve mobility, gait, and strength. (MET 7/3/2017)  4. Pt will be able to stand and balance for at least 30 seconds without her walker. (in progress 7/3/2017)  5. Pt will be able to safely navigate up/down a curb with walker with SBA. (in progress 7/3/2017)  Discharge Goals: Time Frame: 12 weeks  1. Pt will be able to ambulate at least 600 ft with rolling walker safely. (in progress 7/3/2017)  2. Pt will be able to navigate up/down at least 4 stairs with railing safely. (in progress 6/23/2017)  3. Pt will be able to increase BLE strength at least 4/5 or greater for increased functional strength for squatting, gait, and stairs. (in progress 6/23/2017)  4. Pt will be able to  romberg stance for at least 30 seconds noting increased balance. (in progress 6/23/2017)  5. Pt will be able to reach overhead with both UEs while standing without UE support as if reaching into a cabinet without loss of balance. (in progress 6/23/2017)  Rehabilitation Potential For Stated Goals: Excellent                  HISTORY:   History of Present Injury/Illness (Reason for Referral):  Pt reports falling for unknown reason in her bathroom Sat Feb 18 and causing R hip IT fracture. Pt underwent ORIF Sun Feb 20, 2017. Pt was discharged to the [facility] around the 2/23/17 and went home around mid April. Pt then had home health PT and was discharged to come to outpatient.  Pt would like to be able to regain balance and strength to walk better. Pt states she is walking around her home but coming into PT today was the largest distance she has walked out of her home. Past Medical History/Comorbidities:   Ms. Yumi Constantino  has a past medical history of Anemia of other chronic disease (4/26/2014); Anxiety; Arrhythmia; CAD (coronary artery disease) (20O7); Chronic systolic heart failure ([facility] Coagulation disorder ([facility] Congestive heart failure ([facility] (08/2007); Contusion of chest wall (4/29/2012); Coronary artery disease involving autologous vein coronary bypass graft without angina pectoris (6/5/2015); Falls (4/23/2014); Generalized OA (6/5/2015); Heart failure ([facility] Hip pain (4/26/2014); History of skin cancer (1992); [place] (hard of hearing); Hygroma (4/23/2014); Hyperglycemia (4/29/2012); Hyperlipidemia; Hypertension; Hypotension (4/23/2014); Mild dementia (12/3/2014); Nausea & vomiting; Neutrophilic leukocytosis (5/02/7446); Osteoarthritis; Osteoporosis; Osteoporosis (12/3/2014); Postmenopausal (12/3/2014); Postmenopausal bone loss; S/P cardiac pacemaker procedure (1/2011); S/P implantation of automatic cardioverter/defibrillator (AICD) (4/29/2012); S/P placement of cardiac pacemaker (4/29/2012); Shoulder fracture (1991); and Syncope (4/29/2012). Ms. Yumi Constantino  has a past surgical history that includes biopsy of skin lesion (1992); bladder suspension (1995?); juan and bso (1984); tonsillectomy (as a child); heart catheterization (2007); shoulder arthroscopy (1991); pacemaker (1/13/2011); and ptca (08/2007).   Social History/Living Environment:    lives with daughter in one story home with ramp  Prior Level of Function/Work/Activity:  Independent with ADLs, Used walker intermittently prior to fall; had assist of daughter for cooking and cleaning  Current Medications:    Current Outpatient Prescriptions:     gabapentin (NEURONTIN) 100 mg capsule, TAKE ONE CAPSULE BY MOUTH AT BEDTIME, Disp: , Rfl: 0   nitroglycerin (NITROSTAT) 0.4 mg SL tablet, 1 Tab by SubLINGual route every five (5) minutes as needed. , Disp: 25 Tab, Rfl: 3    furosemide (LASIX) 20 mg tablet, One tab daily as needed, Disp: 90 Tab, Rfl: 2    carvedilol (COREG) 3.125 mg tablet, Take 1 Tab by mouth two (2) times daily (with meals). , Disp: 60 Tab, Rfl: 5    docusate sodium (COLACE) 100 mg capsule, Take 1 Cap by mouth two (2) times a day. Indications: prevent constipation, Disp: 60 Cap, Rfl: 0    atorvastatin (LIPITOR) 10 mg tablet, TAKE ONE TABLET BY MOUTH ONE TIME DAILY, Disp: 30 Tab, Rfl: 5    amiodarone (CORDARONE) 200 mg tablet, TAKE ONE-HALF TABLET BY MOUTH ONE TIME DAILY, Disp: 15 Tab, Rfl: 5    clopidogrel (PLAVIX) 75 mg tab, TAKE ONE TABLET BY MOUTH ONE TIME DAILY, Disp: 30 Tab, Rfl: 5    calcium-vitamin D (OYSTER SHELL) 500 mg(1,250mg) -200 unit per tablet, Take 1 Tab by mouth three (3) times daily (with meals). , Disp: 90 Tab, Rfl: 0    LORazepam (ATIVAN) 1 mg tablet, Take 1 Tab by mouth every twelve (12) hours as needed for Anxiety. Max Daily Amount: 2 mg., Disp: 14 Tab, Rfl: 0    diclofenac (VOLTAREN) 1 % gel, Apply  to affected area four (4) times daily. , Disp: 100 g, Rfl: 3    albuterol (PROVENTIL HFA, VENTOLIN HFA, PROAIR HFA) 90 mcg/actuation inhaler, Take 1 Puff by inhalation every four (4) hours as needed for Wheezing., Disp: 1 Inhaler, Rfl: 1    ERGOCALCIFEROL, VITAMIN D2, (VITAMIN D2 PO), Take  by mouth., Disp: , Rfl:     aspirin (ASPIRIN) 325 mg tablet, Take 81 mg by mouth every morning. Hold until after surgery, Disp: , Rfl:    Date Last Reviewed:  7/3/2017   EXAMINATION:   Observation/Orthostatic Postural Assessment:           Forward flexed posture with rounded shoulders and forward head posture  Palpation:          Minimal tenderness R lateral hip with only minor scar tissue noted over incision; incision healed well  ROM:       Date:  5/23/17 Date:  06/23/17 Date:     LE ROM Left Right       Hip Flexion 120 ° 100 ° 120 ° 110 ° [facility]     Hip Abduction 20 ° 10 ° 20 ° 20 °     Straight Leg Raise (SLR)  65 °                Knee Flexion 140 ° 130 ° [facility]     Knee Extension 0 ° 0 ° [facility] WF       Strength:     Date:  5/23/17 Date:  06/23/17 Date:     LE MMT Left Right Left Right Left Right   Hip Flex (L1/L2) 4/5 3-/5  3/5     Hip Abd (glut med) 4-/5 2+/5  3-/5     Hip Ext  Not tested       Quad    ( L3/4) 4/5 4-/5  4-/5     Hamstring 4/5 4-/5  4-/5     Anterior Tib (L4/L5) 3/5 3-/5  3/5     Gastroc (S1/2) 3-/5 2+/5  3/5       Special Tests: deferred  Neurological Screen:        Sensation: intact to light touch but reports intermittent numbness/tingling bilateral feet  Functional Mobility:         Gait/Ambulation:  Using rolling walker with SBA to mod I with step through gait-as fatigue begins to shuffle        Transfers:  modified independent with transfer sit to stand from standard chair        Bed Mobility:  Increased effort and time but independent   Balance:          Poor; unable to let go of the walker during standing   Body Structures Involved:  1. Nerves  2. Bones  3. Joints  4. Muscles  5. Ligaments Body Functions Affected:  1. Sensory/Pain  2. Neuromusculoskeletal  3. Movement Related Activities and Participation Affected:  1. General Tasks and Demands  2. Mobility  3. Self Care  4. Domestic Life  5. Community, Social and Civic Life   CLINICAL PRESENTATION:   CLINICAL DECISION MAKING:   Outcome Measure: Tool Used: Lower Extremity Functional Scale (LEFS)  Score:  Initial: 12/80  Most Recent: 15/80 (Date: 06/23/17 )   Interpretation of Score: 20 questions each scored on a 5 point scale with 0 representing \"extreme difficulty or unable to perform\" and 4 representing \"no difficulty\". The lower the score, the greater the functional disability. 80/80 represents no disability. Minimal detectable change is 9 points. Score 80 79-63 62-48 47-32 31-16 15-1 0   Modifier CH CI CJ CK CL CM CN     ?  Mobility - Walking and Moving Around:     - CURRENT STATUS: CM - 80%-99% impaired, limited or restricted    - GOAL STATUS: CK - 40%-59% impaired, limited or restricted    - D/C STATUS:  ---------------To be determined---------------    Medical Necessity:   · Patient is expected to demonstrate progress in strength, range of motion, balance and functional technique to decrease assistance required with gait and ADLs and improve safety during gait. Reason for Services/Other Comments:  · Patient continues to require skilled intervention due to having difficulty walking and requires further PT to advance exercises for balance, strengthening, and gait. TREATMENT:   (In addition to Assessment/Re-Assessment sessions the following treatments were rendered)    Therapeutic Exercise: (see flow sheet below for minutes) ):  Exercises per grid below to improve mobility and strength. Required moderate visual, verbal and tactile cues to promote proper body alignment. Aquatic Therapy (see flow sheet below for minutes): Aquatic treatment performed per flow grid for Decreased muscle strength, Decreased static/dynamic balance and reactive control, Decreased range of motion and Ease of movement. Cues provided for technique and posture. Assistance by therapist provided for balance. Patient has difficulty with balance. Date: 5/23/17 6/6/17 6/9/17 6/12/17 6/15/17 06/23/17 6/27/17 6/29/17 7/3/17   Modalities:  Defibrillator/Pacemaker                                    Manual Therapy:                                    Aquatic Exercise:   With CGA/SBA of therapist in water c pt  60 mins  1.5# 45 min 1.5# 55 min 1.5 # 55 min 1.5# 60 min 1.5#/40 minutes 1.5#/40 minutes    Pt to use lift chair for in/out of pool            Gait F/B/S/M  With Gait Belt  Forward  CGA 20 mins total c rest breaks [id number]  X2L March  Side step 10ft to L   CGA/Ever c rest breaks X6L Forward 4ft CGA    X2L Forward 3 ft Min A X4 L forward 3.5ft  X2L forward 4.5ft  X1L March 4.5ft    Gait belt and CGA/Ever X2L F/B/S/M with aquatic walker x4 L each with aquatic walker x4 L     4-way hip  4x5 X10 B X10 B X15 B X15 BLE at edge of pool x20 BLE x20 BLE    PF/DF  x20 X15 X10 X15 x15 x20 x25    Hamstring Curls     X10 B       Hip Flexion with knee ext.   (rockernestina)  LAQ sitting in lift chair LAQ sitting in lift chair X15 BLE  LAQ sitting in lift chair BLE  x10 BLE x15 BLE    Squats     X15 CGA X15 X15  x20 x25    SLR march            Core:              Core:            Deep well bike            Deep well jumping jelena            Deep well scissors            Deep well:  traction            Core: Seated rows    X10 c open paddles X15 c open paddles X15 with hydro bells x15 open paddles x15 closed paddles    Core: Seated horizontal ad/Abd      X15 with hydro bells x15 x15 closed paddles    Core: Seated abd/add    X10 c open paddles X15 c open paddles X15 with hydro bells x15     Seated hip abd/add    Yellow ball squeeze X10H5  Manual resistance abd X10H5  X15H3 manual resistance/ using add ball      Seated leg press    Manual resistance X10 B X15 manual resistance  same      Seated Marches       X15 B x15 B X20 BLE x20 BLE x20 BLE    Standing Marches     X15 B       Seated LAQ    X15 B  X20 BLE      Hamstring Stretch            Piriformis stretch            PF Stretch            Balance: standing without use of rail        3 x 30 seconds with mild perturbations of water 3 x 1 minute with mild perturbations with one round    Balance: standing with UE reaching overhead        2x10 2x5    Step-ups        x10 RLE    Balance with turning body to R/L        x5 reps each    Therapist accompanied pt to car, c CGA during amb gait and SBA for getting in car    done          Therapeutic Exercise: 8 minutes        55 min   Supine hip abduction x15        Seated 2X10 red theraband   bridging x15        X15   Heel/toe raises x5        Seated X15   Sit to stand         X5   Supine hip add         X10 H5 Seated marches         2X10 B   LAQ         2X10 B   SAQ            SLR         2X10 B   Lateral weight shifts with contralateral LE march         X10 B   Lateral weight shifts         X10 B   Diagonal weight shifts            Diagonal weight shifts with reach            A/P weight shift with reach         X5 B   A/P weight shift         X10 B   F=forward  B=Backward  S=sidesteps  M=marches    H=hold    Manual: (see flow sheet above for minutes) ---  Modalities: (see flow sheet above for minutes) ---    HEP: discussed current home health PT HEP and will advance as appropriate in the future. Treatment/Session Assessment:  Pt began land exercises today to increase BLE strength and increase safety at home during ADLs. Pt performed therapeutic exercises listed per flow sheet, needing frequent verbal, visual and tactile cues for correct technique. Pt needed seated rest breaks between standing exercises due to fatigue. Patient demonstrated decreased B ankle ROM during pre-gait weight shifts. Pt c/o minimal increase in R hip pain during session. Plan to continue land exercises next visit. · Pain/ Symptoms: Initial:   2/10 R hip       Post Session:  2/10      · Compliance with Program/Exercises: partial   · Recommendations/Intent for next treatment session: \"Next visit will continue to focus on gait, balance, and strengthening of LEs\". Transition to land exercises.      Total Treatment Duration:  PT Patient Time In/Time Out  Time In: 1100  Time Out: [address]

## 2017-07-06 ENCOUNTER — HOSPITAL ENCOUNTER (EMERGENCY)
Age: 82
Discharge: HOME | End: 2017-07-06
Attending: EMERGENCY MEDICINE
Payer: MEDICARE

## 2017-07-06 ENCOUNTER — APPOINTMENT (OUTPATIENT)
Dept: GENERAL RADIOLOGY | Age: 82
End: 2017-07-06
Attending: EMERGENCY MEDICINE
Payer: MEDICARE

## 2017-07-06 ENCOUNTER — HOSPITAL ENCOUNTER (OUTPATIENT)
Dept: PHYSICAL THERAPY | Age: 82
Discharge: HOME | End: 2017-07-06
Payer: MEDICARE

## 2017-07-06 VITALS
TEMPERATURE: 97.4 F | WEIGHT: 98 LBS | SYSTOLIC BLOOD PRESSURE: 132 MMHG | DIASTOLIC BLOOD PRESSURE: 63 MMHG | OXYGEN SATURATION: 93 % | RESPIRATION RATE: 16 BRPM | HEART RATE: 70 BPM | BODY MASS INDEX: 18.03 KG/M2 | HEIGHT: 62 IN

## 2017-07-06 DIAGNOSIS — S32.591A FRACTURE OF MULTIPLE PUBIC RAMI, RIGHT, CLOSED, INITIAL ENCOUNTER (HCC): Primary | ICD-10-CM

## 2017-07-06 DIAGNOSIS — S51.011A SKIN TEAR OF ELBOW WITHOUT COMPLICATION, RIGHT, INITIAL ENCOUNTER: ICD-10-CM

## 2017-07-06 PROCEDURE — 97110 THERAPEUTIC EXERCISES: CPT

## 2017-07-06 PROCEDURE — 73502 X-RAY EXAM HIP UNI 2-3 VIEWS: CPT

## 2017-07-06 PROCEDURE — 99283 EMERGENCY DEPT VISIT LOW MDM: CPT | Performed by: EMERGENCY MEDICINE

## 2017-07-06 PROCEDURE — 74011250637 HC RX REV CODE- 250/637: Performed by: EMERGENCY MEDICINE

## 2017-07-06 PROCEDURE — 74011250636 HC RX REV CODE- 250/636: Performed by: EMERGENCY MEDICINE

## 2017-07-06 RX ORDER — HYDROCODONE BITARTRATE AND ACETAMINOPHEN 5; 325 MG/1; MG/1
1 TABLET ORAL
Qty: 20 TAB | Refills: 0 | Status: SHIPPED | OUTPATIENT
Start: 2017-07-06 | End: 2017-08-09

## 2017-07-06 RX ORDER — HYDROCODONE BITARTRATE AND ACETAMINOPHEN 5; 325 MG/1; MG/1
1 TABLET ORAL
Status: COMPLETED | OUTPATIENT
Start: 2017-07-06 | End: 2017-07-06

## 2017-07-06 RX ADMIN — SODIUM CHLORIDE 1000 ML: 900 INJECTION, SOLUTION INTRAVENOUS at 15:33

## 2017-07-06 RX ADMIN — HYDROCODONE BITARTRATE AND ACETAMINOPHEN 1 TABLET: 5; 325 TABLET ORAL at 14:24

## 2017-07-06 NOTE — PROGRESS NOTES
Alcides Multani  : [id number] @ [address]  [address] [place] [address].  [phone]   [phone]        OUTPATIENT PHYSICAL THERAPY:Daily Note 2017      ICD-10: Treatment Diagnosis: Pain in right hip (M25.551)  Difficulty in walking, not elsewhere classified (R26.2)  Precautions/Allergies:   Celebrex [celecoxib] and Other medication   Fall Risk Score: 6 (? 5 = High Risk)  MD Orders: s/p Right IT Fracture: Strengthen Hip abductors and quad, gait training WBAT MEDICAL/REFERRING DIAGNOSIS:  Hip fracture, right ([facility] [address] [S72.001A]   DATE OF ONSET: surgery 17  REFERRING PHYSICIAN: Blanca Keene MD  RETURN PHYSICIAN APPOINTMENT: Aug 2017     INITIAL ASSESSMENT:  Ms. Jennifer Guerra presents with R hip pain and difficulty walking s/p ORIF for IT fracture. Pt has very poor balance and requires rolling walker for gait. Pt has decreased LE strength weaker with R hip and decreased ROM with R hip and knee. Pt will benefit from PT for strengthening, ROM, balance and gait training. Pt has increased difficulty with transfers and requires some assist with ADLs. Pt will benefit from beginning in aquatic environment for ease of movement and safety with challenging pt's balance and then will progress towards further land based strengthening, balance, and gait training. PROBLEM LIST (Impacting functional limitations):  1. Decreased Strength  2. Decreased ADL/Functional Activities  3. Decreased Transfer Abilities  4. Decreased Ambulation Ability/Technique  5. Decreased Balance  6. Increased Pain  7. Decreased Activity Tolerance  8. Decreased Flexibility/Joint Mobility  9. Decreased [place] with Home Exercise Program INTERVENTIONS PLANNED:  1. Balance Exercise  2. Cold  3. Gait Training  4. Heat  5. Home Exercise Program (HEP)  6. Manual Therapy  7. Neuromuscular Re-education/Strengthening  8. Range of Motion (ROM)  9. Therapeutic Activites  10.  Therapeutic Exercise/Strengthening  11. Transfer Training  12. aquatics   TREATMENT PLAN:  Effective Dates: 5/23/17 TO 8/15/17. Frequency/Duration: 2-3 times a week for 12 weeks  GOALS: (Goals have been discussed and agreed upon with patient.)  Short-Term Functional Goals: Time Frame: 4 weeks  1. Pt will independent with HEP. (in progress 7/6/2017)  2. Pt will increase R hip at least 10 ° in each plane for increased mobility with dressing. (MET 7/6/2017)  3. Pt will be able to participate in aquatic PT for at least 45 minutes without increased symptoms to improve mobility, gait, and strength. (MET 7/6/2017)  4. Pt will be able to stand and balance for at least 30 seconds without her walker. (in progress 7/6/2017)  5. Pt will be able to safely navigate up/down a curb with walker with SBA. (in progress 7/6/2017)  Discharge Goals: Time Frame: 12 weeks  1. Pt will be able to ambulate at least 600 ft with rolling walker safely. (in progress 7/6/2017)  2. Pt will be able to navigate up/down at least 4 stairs with railing safely. (in progress 6/23/2017)  3. Pt will be able to increase BLE strength at least 4/5 or greater for increased functional strength for squatting, gait, and stairs. (in progress 6/23/2017)  4. Pt will be able to  romberg stance for at least 30 seconds noting increased balance. (in progress 6/23/2017)  5. Pt will be able to reach overhead with both UEs while standing without UE support as if reaching into a cabinet without loss of balance. (in progress 6/23/2017)  Rehabilitation Potential For Stated Goals: Excellent                  HISTORY:   History of Present Injury/Illness (Reason for Referral):  Pt reports falling for unknown reason in her bathroom Sat Feb 18 and causing R hip IT fracture. Pt underwent ORIF Sun Feb 20, 2017. Pt was discharged to the [facility] around the 2/23/17 and went home around mid April. Pt then had home health PT and was discharged to come to outpatient.  Pt would like to be able to regain balance and strength to walk better. Pt states she is walking around her home but coming into PT today was the largest distance she has walked out of her home. Past Medical History/Comorbidities:   Ms. Reddy Doctor  has a past medical history of Anemia of other chronic disease (4/26/2014); Anxiety; Arrhythmia; CAD (coronary artery disease) (20O7); Chronic systolic heart failure ([facility] [address] Coagulation disorder ([facility] [address] Congestive heart failure ([facility] [address] (08/2007); Contusion of chest wall (4/29/2012); Coronary artery disease involving autologous vein coronary bypass graft without angina pectoris (6/5/2015); Falls (4/23/2014); Generalized OA (6/5/2015); Heart failure ([facility] [address] Hip pain (4/26/2014); History of skin cancer (1992); [place] (hard of hearing); Hygroma (4/23/2014); Hyperglycemia (4/29/2012); Hyperlipidemia; Hypertension; Hypotension (4/23/2014); Mild dementia (12/3/2014); Nausea & vomiting; Neutrophilic leukocytosis (8/06/3287); Osteoarthritis; Osteoporosis; Osteoporosis (12/3/2014); Postmenopausal (12/3/2014); Postmenopausal bone loss; S/P cardiac pacemaker procedure (1/2011); S/P implantation of automatic cardioverter/defibrillator (AICD) (4/29/2012); S/P placement of cardiac pacemaker (4/29/2012); Shoulder fracture (1991); and Syncope (4/29/2012). Ms. Reddy Doctor  has a past surgical history that includes biopsy of skin lesion (1992); bladder suspension (1995?); juan and bso (1984); tonsillectomy (as a child); heart catheterization (2007); shoulder arthroscopy (1991); pacemaker (1/13/2011); and ptca (08/2007).   Social History/Living Environment:    lives with daughter in one story home with ramp  Prior Level of Function/Work/Activity:  Independent with ADLs, Used walker intermittently prior to fall; had assist of daughter for cooking and cleaning  Current Medications:    Current Outpatient Prescriptions:     gabapentin (NEURONTIN) 100 mg capsule, TAKE ONE CAPSULE BY MOUTH AT BEDTIME, Disp: , Rfl: 0   nitroglycerin (NITROSTAT) 0.4 mg SL tablet, 1 Tab by SubLINGual route every five (5) minutes as needed. , Disp: 25 Tab, Rfl: 3    furosemide (LASIX) 20 mg tablet, One tab daily as needed, Disp: 90 Tab, Rfl: 2    carvedilol (COREG) 3.125 mg tablet, Take 1 Tab by mouth two (2) times daily (with meals). , Disp: 60 Tab, Rfl: 5    docusate sodium (COLACE) 100 mg capsule, Take 1 Cap by mouth two (2) times a day. Indications: prevent constipation, Disp: 60 Cap, Rfl: 0    atorvastatin (LIPITOR) 10 mg tablet, TAKE ONE TABLET BY MOUTH ONE TIME DAILY, Disp: 30 Tab, Rfl: 5    amiodarone (CORDARONE) 200 mg tablet, TAKE ONE-HALF TABLET BY MOUTH ONE TIME DAILY, Disp: 15 Tab, Rfl: 5    clopidogrel (PLAVIX) 75 mg tab, TAKE ONE TABLET BY MOUTH ONE TIME DAILY, Disp: 30 Tab, Rfl: 5    calcium-vitamin D (OYSTER SHELL) 500 mg(1,250mg) -200 unit per tablet, Take 1 Tab by mouth three (3) times daily (with meals). , Disp: 90 Tab, Rfl: 0    LORazepam (ATIVAN) 1 mg tablet, Take 1 Tab by mouth every twelve (12) hours as needed for Anxiety. Max Daily Amount: 2 mg., Disp: 14 Tab, Rfl: 0    diclofenac (VOLTAREN) 1 % gel, Apply  to affected area four (4) times daily. , Disp: 100 g, Rfl: 3    albuterol (PROVENTIL HFA, VENTOLIN HFA, PROAIR HFA) 90 mcg/actuation inhaler, Take 1 Puff by inhalation every four (4) hours as needed for Wheezing., Disp: 1 Inhaler, Rfl: 1    ERGOCALCIFEROL, VITAMIN D2, (VITAMIN D2 PO), Take  by mouth., Disp: , Rfl:     aspirin (ASPIRIN) 325 mg tablet, Take 81 mg by mouth every morning. Hold until after surgery, Disp: , Rfl:    Date Last Reviewed:  7/6/2017   EXAMINATION:   Observation/Orthostatic Postural Assessment:           Forward flexed posture with rounded shoulders and forward head posture  Palpation:          Minimal tenderness R lateral hip with only minor scar tissue noted over incision; incision healed well  ROM:       Date:  5/23/17 Date:  06/23/17 Date:     LE ROM Left Right       Hip Flexion 120 ° 100 ° 120 ° 110 ° [facility]     Hip Abduction 20 ° 10 ° 20 ° 20 °     Straight Leg Raise (SLR)  65 °                Knee Flexion 140 ° 130 ° [facility]     Knee Extension 0 ° 0 ° [facility] WF       Strength:     Date:  5/23/17 Date:  06/23/17 Date:     LE MMT Left Right Left Right Left Right   Hip Flex (L1/L2) 4/5 3-/5  3/5     Hip Abd (glut med) 4-/5 2+/5  3-/5     Hip Ext  Not tested       Quad    ( L3/4) 4/5 4-/5  4-/5     Hamstring 4/5 4-/5  4-/5     Anterior Tib (L4/L5) 3/5 3-/5  3/5     Gastroc (S1/2) 3-/5 2+/5  3/5       Special Tests: deferred  Neurological Screen:        Sensation: intact to light touch but reports intermittent numbness/tingling bilateral feet  Functional Mobility:         Gait/Ambulation:  Using rolling walker with SBA to mod I with step through gait-as fatigue begins to shuffle        Transfers:  modified independent with transfer sit to stand from standard chair        Bed Mobility:  Increased effort and time but independent   Balance:          Poor; unable to let go of the walker during standing   Body Structures Involved:  1. Nerves  2. Bones  3. Joints  4. Muscles  5. Ligaments Body Functions Affected:  1. Sensory/Pain  2. Neuromusculoskeletal  3. Movement Related Activities and Participation Affected:  1. General Tasks and Demands  2. Mobility  3. Self Care  4. Domestic Life  5. Community, Social and Civic Life   CLINICAL PRESENTATION:   CLINICAL DECISION MAKING:   Outcome Measure: Tool Used: Lower Extremity Functional Scale (LEFS)  Score:  Initial: 12/80  Most Recent: 15/80 (Date: 06/23/17 )   Interpretation of Score: 20 questions each scored on a 5 point scale with 0 representing \"extreme difficulty or unable to perform\" and 4 representing \"no difficulty\". The lower the score, the greater the functional disability. 80/80 represents no disability. Minimal detectable change is 9 points. Score 80 79-63 62-48 47-32 31-16 15-1 0   Modifier CH CI CJ CK CL CM CN     ?  Mobility - Walking and Moving Around:     - CURRENT STATUS: CM - 80%-99% impaired, limited or restricted    - GOAL STATUS: CK - 40%-59% impaired, limited or restricted    - D/C STATUS:  ---------------To be determined---------------    Medical Necessity:   · Patient is expected to demonstrate progress in strength, range of motion, balance and functional technique to decrease assistance required with gait and ADLs and improve safety during gait. Reason for Services/Other Comments:  · Patient continues to require skilled intervention due to having difficulty walking and requires further PT to advance exercises for balance, strengthening, and gait. TREATMENT:   (In addition to Assessment/Re-Assessment sessions the following treatments were rendered)    Therapeutic Exercise: (see flow sheet below for minutes) ):  Exercises per grid below to improve mobility and strength. Required moderate visual, verbal and tactile cues to promote proper body alignment. Aquatic Therapy (see flow sheet below for minutes): Aquatic treatment performed per flow grid for Decreased muscle strength, Decreased static/dynamic balance and reactive control, Decreased range of motion and Ease of movement. Cues provided for technique and posture. Assistance by therapist provided for balance. Patient has difficulty with balance. Date: 5/23/17 6/6/17 6/9/17 6/12/17 6/15/17 06/23/17 6/27/17 6/29/17 7/3/17 07/06/17   Modalities:  Defibrillator/Pacemaker                                       Manual Therapy:                                       Aquatic Exercise:   With CGA/SBA of therapist in water c pt  60 mins  1.5# 45 min 1.5# 55 min 1.5 # 55 min 1.5# 60 min 1.5#/40 minutes 1.5#/40 minutes     Pt to use lift chair for in/out of pool             Gait F/B/S/M  With Gait Belt  Forward  CGA 20 mins total c rest breaks [id number]  X2L March  Side step 10ft to L   CGA/Ever c rest breaks X6L Forward 4ft CGA    X2L Forward 3 ft Min A X4 L forward 3.5ft  X2L forward 4.5ft  X1L March 4.5ft    Gait belt and CGA/Ever X2L F/B/S/M with aquatic walker x4 L each with aquatic walker x4 L      4-way hip  4x5 X10 B X10 B X15 B X15 BLE at edge of pool x20 BLE x20 BLE     PF/DF  x20 X15 X10 X15 x15 x20 x25     Hamstring Curls     X10 B        Hip Flexion with knee ext.   (ele)  LAQ sitting in lift chair LAQ sitting in lift chair X15 BLE  LAQ sitting in lift chair BLE  x10 BLE x15 BLE     Squats     X15 CGA X15 X15  x20 x25     SLR march             Core:               Core:             Deep well bike             Deep well jumping jelena             Deep well scissors             Deep well:  traction             Core: Seated rows    X10 c open paddles X15 c open paddles X15 with hydro bells x15 open paddles x15 closed paddles     Core: Seated horizontal ad/Abd      X15 with hydro bells x15 x15 closed paddles     Core: Seated abd/add    X10 c open paddles X15 c open paddles X15 with hydro bells x15      Seated hip abd/add    Yellow ball squeeze X10H5  Manual resistance abd X10H5  X15H3 manual resistance/ using add ball       Seated leg press    Manual resistance X10 B X15 manual resistance  same       Seated Marches       X15 B x15 B X20 BLE x20 BLE x20 BLE     Standing Marches     X15 B        Seated LAQ    X15 B  X20 BLE       Hamstring Stretch             Piriformis stretch             PF Stretch             Balance: standing without use of rail        3 x 30 seconds with mild perturbations of water 3 x 1 minute with mild perturbations with one round     Balance: standing with UE reaching overhead        2x10 2x5     Step-ups        x10 RLE     Balance with turning body to R/L        x5 reps each     Therapist accompanied pt to car, c CGA during amb gait and SBA for getting in car    done           Therapeutic Exercise: 8 minutes        55 min 45 min   Supine hip abduction x15        Seated 2X10 red theraband x15 BLE 2#   bridging x15        X15 x15 with add ball   Heel/toe raises x5        Seated X15    Sit to stand         X5    Supine hip add         X10 H5    Seated marches         2X10 B x20 BLE 2#   LAQ         2X10 B 2x10 BLE 2#   SAQ             SLR         2X10 B x20 BLE 2#    Seated Hip abduction          x20 with red TB   Seated Hip adduction          x20H5 with add ball   Gait Training          x75 ft with RW   Lateral weight shifts with contralateral LE march         X10 B X15 BLE- progressed to 2 to 3 side steps R/L   Lateral weight shifts         X10 B    Diagonal weight shifts             Diagonal weight shifts with reach             A/P weight shift with reach         X5 B    A/P weight shift         X10 B X15 BLE- progressed to 2 to 3 steps forward    F=forward  B=Backward  S=sidesteps  M=marches    H=hold    Manual: (see flow sheet above for minutes) ---  Modalities: (see flow sheet above for minutes) ---    HEP: discussed current home health PT HEP and will advance as appropriate in the future. Treatment/Session Assessment:  Pt continued land exercises today to increase BLE strength and increase safety at home during ADLs with addition of 2# wt on BLE for some exercises. Pt performed therapeutic exercises listed per flow sheet, needing frequent verbal, visual and tactile cues for correct technique. Worked on wt shifts for heel to toe strike as well as increased step length for functional/ safe- pt does well initially but easily distracted and fatigues early. Pt c/o minimal increase in R hip pain during session. Plan to continue land exercises next visit. · Pain/ Symptoms: Initial:   2/10 R hip       Post Session:  2/10      · Compliance with Program/Exercises: partial   · Recommendations/Intent for next treatment session: \"Next visit will continue to focus on gait, balance, and strengthening of LEs\". Plan to continue to work on land.      Total Treatment Duration:  PT Patient Time In/Time Out  Time In: 1015  Time Out: 1305 [facility], PT

## 2017-07-06 NOTE — DISCHARGE INSTRUCTIONS
Broken Pelvis: Care Instructions  Your Care Instructions    The pelvis is the ring of bones between your hips. It connects to the spine and to the leg bones at the hip joints. Blood vessels, nerves, and muscles run through the pelvic ring and can be affected by a break. A broken pelvis also can affect the organs in your pelvic area. A broken pelvis may need a few months to heal. You may have had surgery to repair your pelvis, depending on where it was broken and how bad the break was. Your doctor may have put metal screws, pins, or a monique in your pelvis to fix the break. In some cases, surgery is not needed. While your pelvis heals, you will need to keep weight off the hips. Once you are able to walk, a walker or crutches can help you get around. You can help your pelvis heal with care at home. Your doctor may prescribe medicine to relieve pain and prevent blood clots. You heal best when you take good care of yourself. Eat a variety of healthy foods, and don't smoke. Follow-up care is a key part of your treatment and safety. Be sure to make and go to all appointments, and call your doctor if you are having problems. It's also a good idea to know your test results and keep a list of the medicines you take. How can you care for yourself at home? · Put ice or a cold pack on the painful area for 10 to 20 minutes at a time. Try to do this every 1 to 2 hours for the next 3 days (when you are awake). Put a thin cloth between the ice and your skin. · Be safe with medicines. Take pain medicines exactly as directed. ¨ If the doctor gave you a prescription medicine for pain, take it as prescribed. ¨ If you are not taking a prescription pain medicine, ask your doctor if you can take an over-the-counter medicine. · Put only as much weight on each leg as your doctor tells you to. He or she may advise you to use crutches, a walker, or a cane to help you walk. · Avoid constipation.   ¨ Include fruits, vegetables, beans, and whole grains in your diet each day. These foods are high in fiber. ¨ Drink plenty of fluids, enough so that your urine is light yellow or clear like water. If you have kidney, heart, or liver disease and have to limit fluids, talk with your doctor before you increase the amount of fluids you drink. ¨ Get some exercise every day, once you are able to walk and your doctor tells you it is okay to exercise. Build up slowly to 30 to 60 minutes a day on 5 or more days of the week. ¨ Take a fiber supplement, such as Citrucel or Metamucil, every day if needed. Read and follow all instructions on the label. ¨ Schedule time each day for a bowel movement. Having a daily routine may help. Take your time and do not strain when having a bowel movement. When should you call for help? Call 911 anytime you think you may need emergency care. For example, call if:  · You have symptoms of a blood clot in your lung (called a pulmonary embolism). These may include:  ¨ Sudden chest pain. ¨ Trouble breathing. ¨ Coughing up blood. Call your doctor now or seek immediate medical care if:  · You have increased or severe pain. · Your leg or foot is cool, pale, or changes color. · You have tingling, weakness, or numbness in your foot and toes. · You cannot move your toes. · You have signs of a blood clot, such as:  ¨ Pain in your calf, back of the knee, thigh, or groin. ¨ Redness and swelling in your leg or groin. · You have a nosebleed. · Your gums bleed when you brush your teeth. · You have blood in your urine. · You have trouble urinating. Watch closely for changes in your health, and be sure to contact your doctor if:  · You do not get better as expected. Where can you learn more? Go to http://eli-lynne.info/. Enter H700 in the search box to learn more about \"Broken Pelvis: Care Instructions. \"  Current as of: March 21, 2017  Content Version: 11.3  © 1480-0408 [employer]. Care instructions adapted under license by [employer] (which disclaims liability or warranty for this information). If you have questions about a medical condition or this instruction, always ask your healthcare professional. [address] any warranty or liability for your use of this information.

## 2017-07-06 NOTE — ED NOTES
Discharge instructions given verbally and in written form to pt and pt's daughter. Pt's daughter verbalized understanding of instructions and prescription given. PIV removed and pt left the ER with assistance via w/c. Pt able to stand with her rolling walker and stand by assistance 5 ft.

## 2017-07-06 NOTE — PROGRESS NOTES
Met with patient and family in ER per MD request.  Per MD, patient needs Home Health therapy. Per family, patient currently going to outpatient therapy but with recent fall, they would like to go back to [facility]. Family will call outpt PT and put on hold for now. Family would like to use [facility]  / they had [facility] last.  Verified phone number, address and PCP. Called [facility] / will send information.
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
## 2017-07-10 ENCOUNTER — HOSPITAL ENCOUNTER (OUTPATIENT)
Dept: PHYSICAL THERAPY | Age: 82
End: 2017-07-10
Payer: MEDICARE

## 2017-07-14 ENCOUNTER — APPOINTMENT (OUTPATIENT)
Dept: PHYSICAL THERAPY | Age: 82
End: 2017-07-14
Payer: MEDICARE

## 2017-07-17 ENCOUNTER — APPOINTMENT (OUTPATIENT)
Dept: PHYSICAL THERAPY | Age: 82
End: 2017-07-17
Payer: MEDICARE

## 2017-07-20 ENCOUNTER — APPOINTMENT (OUTPATIENT)
Dept: PHYSICAL THERAPY | Age: 82
End: 2017-07-20
Payer: MEDICARE

## 2017-07-24 ENCOUNTER — APPOINTMENT (OUTPATIENT)
Dept: PHYSICAL THERAPY | Age: 82
End: 2017-07-24
Payer: MEDICARE

## 2017-07-28 ENCOUNTER — APPOINTMENT (OUTPATIENT)
Dept: PHYSICAL THERAPY | Age: 82
End: 2017-07-28
Payer: MEDICARE

## 2017-08-06 ENCOUNTER — APPOINTMENT (OUTPATIENT)
Dept: CT IMAGING | Age: 82
DRG: 579 | End: 2017-08-06
Attending: EMERGENCY MEDICINE
Payer: MEDICARE

## 2017-08-06 ENCOUNTER — HOSPITAL ENCOUNTER (INPATIENT)
Age: 82
LOS: 3 days | Discharge: NURSING, REHAB OR LONG-TERM CARE | DRG: 579 | End: 2017-08-09
Attending: EMERGENCY MEDICINE | Admitting: HOSPITALIST
Payer: MEDICARE

## 2017-08-06 DIAGNOSIS — S06.300A INTRACRANIAL HEMORRHAGE AFTER INJURY WITHOUT LOSS OF CONSCIOUSNESS, INITIAL ENCOUNTER (HCC): Primary | ICD-10-CM

## 2017-08-06 DIAGNOSIS — S01.81XA FACIAL LACERATION, INITIAL ENCOUNTER: ICD-10-CM

## 2017-08-06 DIAGNOSIS — F41.1 GAD (GENERALIZED ANXIETY DISORDER): ICD-10-CM

## 2017-08-06 DIAGNOSIS — S61.411A SKIN TEAR OF RIGHT HAND WITHOUT COMPLICATION, INITIAL ENCOUNTER: ICD-10-CM

## 2017-08-06 LAB
ALBUMIN SERPL BCP-MCNC: 3.3 G/DL (ref 3.2–4.6)
ALBUMIN/GLOB SERPL: 0.9 {RATIO} (ref 1.2–3.5)
ALP SERPL-CCNC: 293 U/L (ref 50–136)
ALT SERPL-CCNC: 11 U/L (ref 12–65)
ANION GAP BLD CALC-SCNC: 9 MMOL/L (ref 7–16)
AST SERPL W P-5'-P-CCNC: 21 U/L (ref 15–37)
BACTERIA SPEC CULT: ABNORMAL
BACTERIA SPEC CULT: ABNORMAL
BILIRUB SERPL-MCNC: 0.6 MG/DL (ref 0.2–1.1)
BUN SERPL-MCNC: 17 MG/DL (ref 8–23)
CALCIUM SERPL-MCNC: 10.1 MG/DL (ref 8.3–10.4)
CHLORIDE SERPL-SCNC: 106 MMOL/L (ref 98–107)
CO2 SERPL-SCNC: 29 MMOL/L (ref 21–32)
CREAT SERPL-MCNC: 1.09 MG/DL (ref 0.6–1)
ERYTHROCYTE [DISTWIDTH] IN BLOOD BY AUTOMATED COUNT: 14.8 % (ref 11.9–14.6)
GLOBULIN SER CALC-MCNC: 3.6 G/DL (ref 2.3–3.5)
GLUCOSE SERPL-MCNC: 98 MG/DL (ref 65–100)
HCT VFR BLD AUTO: 41.7 % (ref 35.8–46.3)
HGB BLD-MCNC: 13.9 G/DL (ref 11.7–15.4)
INR PPP: 1 (ref 0.9–1.2)
MCH RBC QN AUTO: 30.8 PG (ref 26.1–32.9)
MCHC RBC AUTO-ENTMCNC: 33.3 G/DL (ref 31.4–35)
MCV RBC AUTO: 92.5 FL (ref 79.6–97.8)
PLATELET # BLD AUTO: 217 K/UL (ref 150–450)
PMV BLD AUTO: 9.8 FL (ref 10.8–14.1)
POTASSIUM SERPL-SCNC: 5.1 MMOL/L (ref 3.5–5.1)
PROT SERPL-MCNC: 6.9 G/DL (ref 6.3–8.2)
PROTHROMBIN TIME: 10.7 SEC (ref 9.6–12)
RBC # BLD AUTO: 4.51 M/UL (ref 4.05–5.25)
SERVICE CMNT-IMP: ABNORMAL
SODIUM SERPL-SCNC: 144 MMOL/L (ref 136–145)
WBC # BLD AUTO: 5.6 K/UL (ref 4.3–11.1)

## 2017-08-06 PROCEDURE — 74011000302 HC RX REV CODE- 302: Performed by: HOSPITALIST

## 2017-08-06 PROCEDURE — 74011000250 HC RX REV CODE- 250: Performed by: HOSPITALIST

## 2017-08-06 PROCEDURE — 77030002986 HC SUT PROL J&J -A: Performed by: EMERGENCY MEDICINE

## 2017-08-06 PROCEDURE — 85027 COMPLETE CBC AUTOMATED: CPT | Performed by: EMERGENCY MEDICINE

## 2017-08-06 PROCEDURE — 0WQ2XZZ REPAIR FACE, EXTERNAL APPROACH: ICD-10-PCS | Performed by: EMERGENCY MEDICINE

## 2017-08-06 PROCEDURE — 85610 PROTHROMBIN TIME: CPT | Performed by: EMERGENCY MEDICINE

## 2017-08-06 PROCEDURE — 99285 EMERGENCY DEPT VISIT HI MDM: CPT | Performed by: EMERGENCY MEDICINE

## 2017-08-06 PROCEDURE — 74011250637 HC RX REV CODE- 250/637: Performed by: HOSPITALIST

## 2017-08-06 PROCEDURE — 65620000000 HC RM CCU GENERAL

## 2017-08-06 PROCEDURE — 75810000293 HC SIMP/SUPERF WND  RPR: Performed by: EMERGENCY MEDICINE

## 2017-08-06 PROCEDURE — 87641 MR-STAPH DNA AMP PROBE: CPT | Performed by: HOSPITALIST

## 2017-08-06 PROCEDURE — 70450 CT HEAD/BRAIN W/O DYE: CPT

## 2017-08-06 PROCEDURE — 80053 COMPREHEN METABOLIC PANEL: CPT | Performed by: EMERGENCY MEDICINE

## 2017-08-06 PROCEDURE — 86580 TB INTRADERMAL TEST: CPT | Performed by: HOSPITALIST

## 2017-08-06 PROCEDURE — 93005 ELECTROCARDIOGRAM TRACING: CPT | Performed by: EMERGENCY MEDICINE

## 2017-08-06 PROCEDURE — 77030032490 HC SLV COMPR SCD KNE COVD -B

## 2017-08-06 RX ORDER — AMIODARONE HYDROCHLORIDE 200 MG/1
100 TABLET ORAL DAILY
Status: DISCONTINUED | OUTPATIENT
Start: 2017-08-07 | End: 2017-08-09 | Stop reason: HOSPADM

## 2017-08-06 RX ORDER — SODIUM POLYSTYRENE SULFONATE 15 G/60ML
15 SUSPENSION ORAL; RECTAL
Status: COMPLETED | OUTPATIENT
Start: 2017-08-06 | End: 2017-08-07

## 2017-08-06 RX ORDER — MORPHINE SULFATE 2 MG/ML
1 INJECTION, SOLUTION INTRAMUSCULAR; INTRAVENOUS
Status: DISCONTINUED | OUTPATIENT
Start: 2017-08-06 | End: 2017-08-09 | Stop reason: HOSPADM

## 2017-08-06 RX ORDER — ACETAMINOPHEN 325 MG/1
650 TABLET ORAL
Status: DISCONTINUED | OUTPATIENT
Start: 2017-08-06 | End: 2017-08-09 | Stop reason: HOSPADM

## 2017-08-06 RX ORDER — FERROUS SULFATE, DRIED 160(50) MG
1 TABLET, EXTENDED RELEASE ORAL
Status: DISCONTINUED | OUTPATIENT
Start: 2017-08-07 | End: 2017-08-09 | Stop reason: HOSPADM

## 2017-08-06 RX ORDER — HYDROCODONE BITARTRATE AND ACETAMINOPHEN 5; 325 MG/1; MG/1
1 TABLET ORAL
Status: DISCONTINUED | OUTPATIENT
Start: 2017-08-06 | End: 2017-08-09 | Stop reason: HOSPADM

## 2017-08-06 RX ORDER — GABAPENTIN 100 MG/1
100 CAPSULE ORAL
Status: DISCONTINUED | OUTPATIENT
Start: 2017-08-06 | End: 2017-08-09 | Stop reason: HOSPADM

## 2017-08-06 RX ORDER — ADHESIVE BANDAGE
30 BANDAGE TOPICAL DAILY PRN
Status: DISCONTINUED | OUTPATIENT
Start: 2017-08-06 | End: 2017-08-09 | Stop reason: HOSPADM

## 2017-08-06 RX ORDER — DICLOFENAC SODIUM 10 MG/G
GEL TOPICAL 4 TIMES DAILY
Status: DISCONTINUED | OUTPATIENT
Start: 2017-08-06 | End: 2017-08-09 | Stop reason: HOSPADM

## 2017-08-06 RX ORDER — SODIUM CHLORIDE 0.9 % (FLUSH) 0.9 %
5-10 SYRINGE (ML) INJECTION AS NEEDED
Status: DISCONTINUED | OUTPATIENT
Start: 2017-08-06 | End: 2017-08-09 | Stop reason: HOSPADM

## 2017-08-06 RX ORDER — SODIUM CHLORIDE 0.9 % (FLUSH) 0.9 %
5-10 SYRINGE (ML) INJECTION EVERY 8 HOURS
Status: DISCONTINUED | OUTPATIENT
Start: 2017-08-06 | End: 2017-08-09 | Stop reason: HOSPADM

## 2017-08-06 RX ORDER — ONDANSETRON 2 MG/ML
4 INJECTION INTRAMUSCULAR; INTRAVENOUS
Status: DISCONTINUED | OUTPATIENT
Start: 2017-08-06 | End: 2017-08-09 | Stop reason: HOSPADM

## 2017-08-06 RX ORDER — LIDOCAINE HYDROCHLORIDE AND EPINEPHRINE 10; 10 MG/ML; UG/ML
10 INJECTION, SOLUTION INFILTRATION; PERINEURAL
Status: DISPENSED | OUTPATIENT
Start: 2017-08-06 | End: 2017-08-07

## 2017-08-06 RX ORDER — NITROGLYCERIN 0.4 MG/1
0.4 TABLET SUBLINGUAL AS NEEDED
Status: DISCONTINUED | OUTPATIENT
Start: 2017-08-06 | End: 2017-08-09 | Stop reason: HOSPADM

## 2017-08-06 RX ORDER — CARVEDILOL 3.12 MG/1
3.12 TABLET ORAL 2 TIMES DAILY WITH MEALS
Status: DISCONTINUED | OUTPATIENT
Start: 2017-08-07 | End: 2017-08-09 | Stop reason: HOSPADM

## 2017-08-06 RX ORDER — FUROSEMIDE 20 MG/1
20 TABLET ORAL DAILY
Status: DISCONTINUED | OUTPATIENT
Start: 2017-08-07 | End: 2017-08-09 | Stop reason: HOSPADM

## 2017-08-06 RX ORDER — NICARDIPINE HYDROCHLORIDE 0.1 MG/ML
5-15 INJECTION INTRAVENOUS
Status: DISCONTINUED | OUTPATIENT
Start: 2017-08-06 | End: 2017-08-07

## 2017-08-06 RX ORDER — NALOXONE HYDROCHLORIDE 0.4 MG/ML
0.4 INJECTION, SOLUTION INTRAMUSCULAR; INTRAVENOUS; SUBCUTANEOUS AS NEEDED
Status: DISCONTINUED | OUTPATIENT
Start: 2017-08-06 | End: 2017-08-09 | Stop reason: HOSPADM

## 2017-08-06 RX ORDER — ATORVASTATIN CALCIUM 10 MG/1
10 TABLET, FILM COATED ORAL
Status: DISCONTINUED | OUTPATIENT
Start: 2017-08-06 | End: 2017-08-09 | Stop reason: HOSPADM

## 2017-08-06 RX ADMIN — NICARDIPINE HYDROCHLORIDE 5 MG/HR: 0.1 INJECTION, SOLUTION INTRAVENOUS at 20:30

## 2017-08-06 RX ADMIN — ATORVASTATIN CALCIUM 10 MG: 10 TABLET, FILM COATED ORAL at 22:08

## 2017-08-06 RX ADMIN — HYDROCODONE BITARTRATE AND ACETAMINOPHEN 1 TABLET: 5; 325 TABLET ORAL at 22:07

## 2017-08-06 RX ADMIN — TUBERCULIN PURIFIED PROTEIN DERIVATIVE 5 UNITS: 5 INJECTION INTRADERMAL at 22:13

## 2017-08-06 RX ADMIN — Medication 10 ML: at 22:13

## 2017-08-06 RX ADMIN — GABAPENTIN 100 MG: 100 CAPSULE ORAL at 22:09
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
## 2017-08-07 ENCOUNTER — APPOINTMENT (OUTPATIENT)
Dept: CT IMAGING | Age: 82
DRG: 579 | End: 2017-08-07
Attending: HOSPITALIST
Payer: MEDICARE

## 2017-08-07 PROBLEM — H91.93 BILATERAL HEARING LOSS: Chronic | Status: ACTIVE | Noted: 2017-08-07

## 2017-08-07 LAB
ANION GAP BLD CALC-SCNC: 14 MMOL/L (ref 7–16)
ATRIAL RATE: 60 BPM
BUN SERPL-MCNC: 15 MG/DL (ref 8–23)
CALCIUM SERPL-MCNC: 9.9 MG/DL (ref 8.3–10.4)
CALCULATED P AXIS, ECG09: 69 DEGREES
CALCULATED R AXIS, ECG10: -28 DEGREES
CALCULATED T AXIS, ECG11: 98 DEGREES
CHLORIDE SERPL-SCNC: 108 MMOL/L (ref 98–107)
CO2 SERPL-SCNC: 23 MMOL/L (ref 21–32)
CREAT SERPL-MCNC: 0.97 MG/DL (ref 0.6–1)
DIAGNOSIS, 93000: NORMAL
ERYTHROCYTE [DISTWIDTH] IN BLOOD BY AUTOMATED COUNT: 14.8 % (ref 11.9–14.6)
GLUCOSE SERPL-MCNC: 82 MG/DL (ref 65–100)
HCT VFR BLD AUTO: 41.8 % (ref 35.8–46.3)
HGB BLD-MCNC: 13.6 G/DL (ref 11.7–15.4)
MCH RBC QN AUTO: 30.3 PG (ref 26.1–32.9)
MCHC RBC AUTO-ENTMCNC: 32.5 G/DL (ref 31.4–35)
MCV RBC AUTO: 93.1 FL (ref 79.6–97.8)
MM INDURATION POC: 0 MM (ref 0–5)
P-R INTERVAL, ECG05: 196 MS
PLATELET # BLD AUTO: 198 K/UL (ref 150–450)
PMV BLD AUTO: 9.4 FL (ref 10.8–14.1)
POTASSIUM SERPL-SCNC: 4 MMOL/L (ref 3.5–5.1)
PPD POC: NEGATIVE NEGATIVE
Q-T INTERVAL, ECG07: 418 MS
QRS DURATION, ECG06: 88 MS
QTC CALCULATION (BEZET), ECG08: 418 MS
RBC # BLD AUTO: 4.49 M/UL (ref 4.05–5.25)
SODIUM SERPL-SCNC: 145 MMOL/L (ref 136–145)
VENTRICULAR RATE, ECG03: 60 BPM
WBC # BLD AUTO: 8.2 K/UL (ref 4.3–11.1)

## 2017-08-07 PROCEDURE — 97162 PT EVAL MOD COMPLEX 30 MIN: CPT

## 2017-08-07 PROCEDURE — 65660000000 HC RM CCU STEPDOWN

## 2017-08-07 PROCEDURE — 77030012893

## 2017-08-07 PROCEDURE — 70450 CT HEAD/BRAIN W/O DYE: CPT

## 2017-08-07 PROCEDURE — 80048 BASIC METABOLIC PNL TOTAL CA: CPT | Performed by: HOSPITALIST

## 2017-08-07 PROCEDURE — 36415 COLL VENOUS BLD VENIPUNCTURE: CPT | Performed by: HOSPITALIST

## 2017-08-07 PROCEDURE — 85027 COMPLETE CBC AUTOMATED: CPT | Performed by: HOSPITALIST

## 2017-08-07 PROCEDURE — 74011250637 HC RX REV CODE- 250/637: Performed by: HOSPITALIST

## 2017-08-07 RX ORDER — MUPIROCIN 20 MG/G
OINTMENT TOPICAL 2 TIMES DAILY
Status: DISCONTINUED | OUTPATIENT
Start: 2017-08-07 | End: 2017-08-09 | Stop reason: HOSPADM

## 2017-08-07 RX ORDER — HYDRALAZINE HYDROCHLORIDE 20 MG/ML
20 INJECTION INTRAMUSCULAR; INTRAVENOUS
Status: DISCONTINUED | OUTPATIENT
Start: 2017-08-07 | End: 2017-08-09 | Stop reason: HOSPADM

## 2017-08-07 RX ADMIN — CARVEDILOL 3.12 MG: 3.12 TABLET, FILM COATED ORAL at 09:57

## 2017-08-07 RX ADMIN — GABAPENTIN 100 MG: 100 CAPSULE ORAL at 21:30

## 2017-08-07 RX ADMIN — ATORVASTATIN CALCIUM 10 MG: 10 TABLET, FILM COATED ORAL at 21:30

## 2017-08-07 RX ADMIN — CALCIUM CARBONATE 500 MG (1,250 MG)-VITAMIN D3 200 UNIT TABLET 1 TABLET: at 09:57

## 2017-08-07 RX ADMIN — CARVEDILOL 3.12 MG: 3.12 TABLET, FILM COATED ORAL at 17:01

## 2017-08-07 RX ADMIN — HYDROCODONE BITARTRATE AND ACETAMINOPHEN 1 TABLET: 5; 325 TABLET ORAL at 21:30

## 2017-08-07 RX ADMIN — FUROSEMIDE 20 MG: 20 TABLET ORAL at 09:57

## 2017-08-07 RX ADMIN — AMIODARONE HYDROCHLORIDE 100 MG: 200 TABLET ORAL at 09:57

## 2017-08-07 RX ADMIN — SODIUM POLYSTYRENE SULFONATE 15 G: 15 SUSPENSION ORAL; RECTAL at 01:16

## 2017-08-07 RX ADMIN — Medication 10 ML: at 21:31

## 2017-08-07 RX ADMIN — Medication 5 ML: at 14:00

## 2017-08-07 RX ADMIN — Medication 5 ML: at 06:00

## 2017-08-07 RX ADMIN — HYDROCODONE BITARTRATE AND ACETAMINOPHEN 1 TABLET: 5; 325 TABLET ORAL at 10:03

## 2017-08-07 RX ADMIN — CALCIUM CARBONATE 500 MG (1,250 MG)-VITAMIN D3 200 UNIT TABLET 1 TABLET: at 17:01

## 2017-08-07 RX ADMIN — MUPIROCIN: 20 OINTMENT TOPICAL at 17:01

## 2017-08-07 NOTE — PROGRESS NOTES
Hospitalist Progress Note     Admit Date:  2017  5:28 PM   Name:  Natalie Farooq   Age:  80 y.o.  :  1928   MRN:  [id number]   PCP:  Matt Cleveland MD  Treatment Team: Attending Provider: Cynthia Wolff MD; Utilization Review: Summer Bishop RN    Subjective:   Patient is an 81 y/o F admitted to ICU for  [address]. cardene gtt was ordered but pt was on it only briefly.  - pt says HA improved. No vision changes, focal weakness/numbness. No Cp, SOB      Objective:     Patient Vitals for the past 24 hrs:   Temp Pulse Resp BP SpO2   17 0730 - 60 11 133/61 98 %   17 0716 - - - - 96 %   17 0700 - 60 9 112/59 96 %   17 0600 - 60 10 117/58 96 %   17 0530 - 60 27 115/61 97 %   17 0459 - 60 15 125/59 97 %   17 0430 97.8 °F (36.6 °C) 62 17 133/64 100 %   17 0422 - 60 18 160/71 98 %   17 0345 - 60 15 92/50 94 %   17 0330 - 60 13 (!) 89/50 94 %   17 0315 - 60 (!) 6 (!) 89/50 96 %   17 0259 - 60 12 95/54 97 %   17 0245 - 60 13 100/54 95 %   17 0230 - 60 8 102/53 96 %   17 0215 - 60 16 102/52 95 %   17 0200 - 60 10 106/52 95 %   17 0145 - 60 14 108/53 94 %   17 0130 - 60 (!) 7 108/54 95 %   17 0114 - 61 20 106/53 96 %   17 0100 - 60 18 95/53 95 %   17 0045 - 60 9 98/53 96 %   17 0030 - 66 11 105/51 96 %   17 0015 - 61 24 113/54 96 %   17 0000 - 71 (!) 60 151/67 100 %   17 2132 - 71 17 143/66 100 %   17 2128 - 76 25 (!) 147/106 (!) 86 %   17 - 64 14 149/70 97 %   17 - 63 17 170/82 98 %   17 60 - 175/79 97 %   17 1731 97.4 °F (36.3 °C) 66 18 177/87 99 %     Oxygen Therapy  O2 Sat (%): 98 % (17)  Pulse via Oximetry: 60 beats per minute (17)  O2 Device: Room air (17 0716)  No intake or output data in the 24 hours ending 17 0831      General:    Well nourished. Alert. CV: RRR. No murmur, rub, or gallop. Lungs:   Clear to auscultation bilaterally. No wheezing, rhonchi, or rales. Abdomen:   Soft, nontender, nondistended. Extremities: Warm and dry. No cyanosis or edema. Skin:     No rashes or jaundice. Data Review:  I have reviewed all labs, meds, telemetry events, and studies from the last 24 hours. Recent Results (from the past 24 hour(s))   CBC W/O DIFF    Collection Time: 08/06/17  7:05 PM   Result Value Ref Range    WBC 5.6 4.3 - 11.1 K/uL    RBC 4.51 4.05 - 5.25 M/uL    HGB 13.9 11.7 - 15.4 g/dL    HCT 41.7 35.8 - 46.3 %    MCV 92.5 79.6 - 97.8 FL    MCH 30.8 26.1 - 32.9 PG    MCHC 33.3 31.4 - 35.0 g/dL    RDW 14.8 (H) 11.9 - 14.6 %    PLATELET 941 136 - 249 K/uL    MPV 9.8 (L) 10.8 - 83.5 FL   METABOLIC PANEL, COMPREHENSIVE    Collection Time: 08/06/17  7:05 PM   Result Value Ref Range    Sodium 144 136 - 145 mmol/L    Potassium 5.1 3.5 - 5.1 mmol/L    Chloride 106 98 - 107 mmol/L    CO2 29 21 - 32 mmol/L    Anion gap 9 7 - 16 mmol/L    Glucose 98 65 - 100 mg/dL    BUN 17 8 - 23 MG/DL    Creatinine 1.09 (H) 0.6 - 1.0 MG/DL    GFR est AA >60 >60 ml/min/1.73m2    GFR est non-AA 50 (L) >60 ml/min/1.73m2    Calcium 10.1 8.3 - 10.4 MG/DL    Bilirubin, total 0.6 0.2 - 1.1 MG/DL    ALT (SGPT) 11 (L) 12 - 65 U/L    AST (SGOT) 21 15 - 37 U/L    Alk.  phosphatase 293 (H) 50 - 136 U/L    Protein, total 6.9 6.3 - 8.2 g/dL    Albumin 3.3 3.2 - 4.6 g/dL    Globulin 3.6 (H) 2.3 - 3.5 g/dL    A-G Ratio 0.9 (L) 1.2 - 3.5     PROTHROMBIN TIME + INR    Collection Time: 08/06/17  7:05 PM   Result Value Ref Range    Prothrombin time 10.7 9.6 - 12.0 sec    INR 1.0 0.9 - 1.2     EKG, 12 LEAD, INITIAL    Collection Time: 08/06/17  7:15 PM   Result Value Ref Range    Ventricular Rate 60 BPM    Atrial Rate 60 BPM    P-R Interval 196 ms    QRS Duration 88 ms    Q-T Interval 418 ms    QTC Calculation (Bezet) 418 ms    Calculated P Axis 69 degrees    Calculated R Axis -28 degrees Calculated T Axis 98 degrees    Diagnosis       !! AGE AND GENDER SPECIFIC ECG ANALYSIS !! Normal sinus rhythm  Anteroseptal infarct (cited on or before 28-APR-2012)  ST & T wave abnormality, consider lateral ischemia  Abnormal ECG  When compared with ECG of 18-FEB-2017 19:33,  Questionable change in initial forces of Anteroseptal leads     MRSA SCREEN - PCR (NASAL)    Collection Time: 08/06/17  9:21 PM   Result Value Ref Range    Special Requests: NO SPECIAL REQUESTS      Culture result: (A)       MRSA target DNA is detected (presumptive positive for MRSA colonization). Culture result:        RESULTS VERIFIED, PHONED TO AND READ BACK BY  JOSE PALENCIA AT 4184 ON 3/7/31 CF     METABOLIC PANEL, BASIC    Collection Time: 08/07/17  4:25 AM   Result Value Ref Range    Sodium 145 136 - 145 mmol/L    Potassium 4.0 3.5 - 5.1 mmol/L    Chloride 108 (H) 98 - 107 mmol/L    CO2 23 21 - 32 mmol/L    Anion gap 14 7 - 16 mmol/L    Glucose 82 65 - 100 mg/dL    BUN 15 8 - 23 MG/DL    Creatinine 0.97 0.6 - 1.0 MG/DL    GFR est AA >60 >60 ml/min/1.73m2    GFR est non-AA 57 (L) >60 ml/min/1.73m2    Calcium 9.9 8.3 - 10.4 MG/DL   CBC W/O DIFF    Collection Time: 08/07/17  4:25 AM   Result Value Ref Range    WBC 8.2 4.3 - 11.1 K/uL    RBC 4.49 4.05 - 5.25 M/uL    HGB 13.6 11.7 - 15.4 g/dL    HCT 41.8 35.8 - 46.3 %    MCV 93.1 79.6 - 97.8 FL    MCH 30.3 26.1 - 32.9 PG    MCHC 32.5 31.4 - 35.0 g/dL    RDW 14.8 (H) 11.9 - 14.6 %    PLATELET 392 186 - 841 K/uL    MPV 9.4 (L) 10.8 - 14.1 FL        All Micro Results     Procedure Component Value Units Date/Time    MRSA SCREEN - PCR (NASAL) [[id number]]  (Abnormal) Collected:  08/06/17 2121    Order Status:  Completed Specimen:  Nasal from Nasal Updated:  08/06/17 2310     Special Requests: NO SPECIAL REQUESTS        Culture result:         MRSA target DNA is detected (presumptive positive for MRSA colonization).  (A)            RESULTS VERIFIED, PHONED TO AND READ BACK BY  [address] RN AT 8025 ON 8/6/17             Current Meds:  Current Facility-Administered Medications   Medication Dose Route Frequency    amiodarone (CORDARONE) tablet 100 mg  100 mg Oral DAILY    atorvastatin (LIPITOR) tablet 10 mg  10 mg Oral QHS    calcium-vitamin D (OS-SUN) 500 mg-200 unit tablet  1 Tab Oral TID WITH MEALS    carvedilol (COREG) tablet 3.125 mg  3.125 mg Oral BID WITH MEALS    diclofenac (VOLTAREN) 1 % topical gel   Topical QID    furosemide (LASIX) tablet 20 mg  20 mg Oral DAILY    gabapentin (NEURONTIN) capsule 100 mg  100 mg Oral QHS    HYDROcodone-acetaminophen (NORCO) 5-325 mg per tablet 1 Tab  1 Tab Oral Q4H PRN    nitroglycerin (NITROSTAT) tablet 0.4 mg  0.4 mg SubLINGual PRN    sodium chloride (NS) flush 5-10 mL  5-10 mL IntraVENous Q8H    sodium chloride (NS) flush 5-10 mL  5-10 mL IntraVENous PRN    acetaminophen (TYLENOL) tablet 650 mg  650 mg Oral Q6H PRN    morphine injection 1 mg  1 mg IntraVENous Q4H PRN    naloxone (NARCAN) injection 0.4 mg  0.4 mg IntraVENous PRN    ondansetron (ZOFRAN) injection 4 mg  4 mg IntraVENous Q4H PRN    magnesium hydroxide (MILK OF MAGNESIA) 400 mg/5 mL oral suspension 30 mL  30 mL Oral DAILY PRN       Other Studies (last 24 hours):  Ct Head Wo Cont    Result Date: 8/7/2017  Examination: CT head without contrast History: Intracranial hemorrhage Technique:  Standard head CT was performed without contrast.   Radiation dose reduction techniques were used for this study:  Our CT scanners use one or all of the following: Automated exposure control, adjustment of the mA and/or kVp according to patient's size, iterative reconstruction. Comparison:  8/6/2017 Findings: Focal intraparenchymal hemorrhage in the right frontal lobe is unchanged in size and appearance when compared with the prior examination. Multifocal subarachnoid hemorrhage is also again seen, also unchanged in distribution. There is no worsening mass effect. There is no midline shift.  Ventricles and basilar cisterns remain patent. Impression: No significant interval change in appearance or distribution of intracranial hemorrhage as detailed above. Ct Head Wo Cont    Result Date: 8/6/2017  CT Brain dated 8/6/2017  Comparison: None Clinical Information:  Fall  5 mm axial images were obtained from skull base to vertex without contrast. Radiation dose reduction techniques were used for this study. Our scanners use one or all of the following:  Automated exposure control, adjustment of the mA and/or kV according to patient size, iterative reconstruction. Findings: Atrophy is present. There is no midline shift. There are multiple small areas of hyperdense hemorrhage within the brain. These involve portions of the right frontal lobe and left parietal lobe. There is also a small amount subarachnoid hemorrhage in a sulcus left parietal lobe, right frontal and right temporal region. No skull fracture. Mastoid air cells and visualized paranasal sinuses are aerated and unremarkable. Impression:  1. Multiple small foci of parenchymal hemorrhage right frontal and left parietal lobe. 2. Small areas of subarachnoid hemorrhage right middle fossa, right frontal region and left parietal region [facility] The critical results contained in this report were communicated to Dr. Jenn Mckay in the emergency department by myself on 8/6/2017 at 1845 hours. Critical results were communicated as outlined in Section II.C.2.a.i of the ACR Practice Guideline for Communication of Diagnostic Imaging Findings.        Assessment and Plan:     Hospital Problems as of 8/7/2017  Date Reviewed: 7/12/2017          Codes Class Noted - Resolved POA    Bilateral hearing loss (Chronic) ICD-10-CM: H91.93  ICD-9-CM: 389.9  8/7/2017 - Present Yes        * (Principal)Intracranial hemorrhage after injury without loss of consciousness ([facility] [address] ICD-10-CM: S06.300A  ICD-9-CM: 853.01  8/6/2017 - Present Yes        Chronic systolic congestive heart failure ([address] (Chronic) ICD-10-CM: I50.22  ICD-9-CM: 428.22, 428.0  5/24/2016 - Present Yes        S/P implantation of automatic cardioverter/defibrillator (AICD) (Chronic) ICD-10-CM: Z95.810  ICD-9-CM: V45.02  4/29/2012 - Present Yes              PLAN:    · ICH stable on repeat head CT  · BP stable, cont meds as listed  · Transfer to floor  · PT/OT/SW consulted    DC planning/Dispo:  Ppd ordered  DVT ppx:  SCDs  Plan discussed with:  pt    Signed:  Marko Goodwin MD
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
## 2017-08-07 NOTE — PROGRESS NOTES
Pt admitted from ED. VSS pt admits to headache from her fall. Skin assessment complete She has a laceration to the right eye that is oozing, right hand has steri strips and is wrapped. She has multiple ecchymotic areas to her arms and legs. Her lower extremities bluish purple. Per pt it gets that way when she gets cold. Sacrum is clear of wounds at this time.  Pt assisted to bedside commode and then to bed with assist x2 per her request

## 2017-08-07 NOTE — PROGRESS NOTES
TRANSFER - IN REPORT:    Verbal report received from [facility]) on Thewood Clark  being received from ED(unit) for routine progression of care      Report consisted of patients Situation, Background, Assessment and   Recommendations(SBAR). Information from the following report(s) SBAR, Kardex and MAR was reviewed with the receiving nurse. Opportunity for questions and clarification was provided. Assessment completed upon patients arrival to unit and care assumed.

## 2017-08-07 NOTE — PROGRESS NOTES
Problem: Mobility Impaired (Adult and Pediatric)  Goal: *Acute Goals and Plan of Care (Insert Text)  LTG:  (1.)Ms. Katherine Rincon will move from supine to sit and sit to supine, scoot up and down and roll side to side in bed with INDEPENDENT within 7 day(s). (2.)Ms. Katherine Rincon will transfer from bed to chair and chair to bed with supervision using the least restrictive device within 7 day(s). (3.)Ms. Katherine Rincon will ambulate with SBA for 150+ feet with the least restrictive device within 7 day(s). (4.)Ms. Katherine Rincon will perform standing static and dynamic balance activities x 8 minutes with SUPERVISION to improve safety within 7 day(s). PHYSICAL THERAPY: INITIAL ASSESSMENT, AM 8/7/2017  INPATIENT: Hospital Day: 2  [place] / [place] / Product Type: Medicare /      NAME/AGE/GENDER: Kevin Solis is a 80 y.o. female           PRIMARY DIAGNOSIS: Intracranial hemorrhage after injury without loss of consciousness ([address] Intracranial hemorrhage after injury without loss of consciousness ([address] Intracranial hemorrhage after injury without loss of consciousness ([address]        ICD-10: Treatment Diagnosis:       · Other abnormalities of gait and mobility (R26.89)  · Repeated Falls (R29.6)   Precaution/Allergies:  Celebrex [celecoxib] and Other medication       ASSESSMENT:      Ms. Katherine Rincon presents supine in bed in CCU, admitted due to [address] with bruising of R face. She was extremely [place] and wearing one HA. Patient transitioned to sit with CGA. Initially with trunk lean R but after a couple minutes sat with midline orientation. Patient with R IT fracture in 2/17 and R hip ROM/strength slightly less than L. Patient stood with CGA and ambulated 12' with RW and min assist with slow yayo and step to pattern. Patient has fallen multiple times per patient's daughter, and has fractured her hip in February and pelvis in July.    Ms. Katherine Rincon would benefit from skilled physical therapy (medically necessary) to address her deficits and maximize her function. This section established at most recent assessment   PROBLEM LIST (Impairments causing functional limitations):  1. Decreased ADL/Functional Activities  2. Decreased Transfer Abilities  3. Decreased Ambulation Ability/Technique  4. Decreased Balance  5. Decreased Activity Tolerance    INTERVENTIONS PLANNED: (Benefits and precautions of physical therapy have been discussed with the patient.)  1. Balance Exercise  2. Bed Mobility  3. Gait Training  4. Therapeutic Activites  5. Therapeutic Exercise/Strengthening  6. Transfer Training  7. education  8. Group Therapy      TREATMENT PLAN: Frequency/Duration: 3 times a week for duration of hospital stay  Rehabilitation Potential For Stated Goals: FAIR      RECOMMENDED REHABILITATION/EQUIPMENT: (at time of discharge pending progress): Continue Skilled Therapy and Rehab. HISTORY:   History of Present Injury/Illness (Reason for Referral):  Per MD note, Patient is 80years old female with multiple medical comorbidities who presented to ER as status post mechanical fall and head trauma. Pt was in the bathroom, was trying to get up from commode and lost her balance and fell forward hitting right forehead on the ground. She didn't lose consciousness, denied any chest pain, palpitations, lightheaded/dizziness, seizure like activity. Pt was attended by her , who called for EMS. Pt was conscious and ambulating. In ER, CT head showed multiple small foci of parenchymal hemorrhage right frontal and left parietal lobe along with small areas of subarachnoid hmg in right middle fossa, right frontal region and left parietal region. Past Medical History/Comorbidities:   Ms. [facility]  has a past medical history of Anemia of other chronic disease (4/26/2014); Anxiety; Arrhythmia; CAD (coronary artery disease) (20O7); Chronic systolic heart failure ([facility] Coagulation disorder ([facility]  Congestive heart failure ([facility] (08/2007); Contusion of chest wall (4/29/2012); Coronary artery disease involving autologous vein coronary bypass graft without angina pectoris (6/5/2015); Falls (4/23/2014); Generalized OA (6/5/2015); Heart failure ([facility] Hip pain (4/26/2014); History of skin cancer (1992); [place] (hard of hearing); Hygroma (4/23/2014); Hyperglycemia (4/29/2012); Hyperlipidemia; Hypertension; Hypotension (4/23/2014); Mild dementia (12/3/2014); Nausea & vomiting; Neutrophilic leukocytosis (9/05/2617); Osteoarthritis; Osteoporosis; Osteoporosis (12/3/2014); Postmenopausal (12/3/2014); Postmenopausal bone loss; S/P cardiac pacemaker procedure (1/2011); S/P implantation of automatic cardioverter/defibrillator (AICD) (4/29/2012); S/P placement of cardiac pacemaker (4/29/2012); Shoulder fracture (1991); and Syncope (4/29/2012). Ms. April Mario  has a past surgical history that includes biopsy of skin lesion (1992); bladder suspension (1995?); juan and bso (1984); tonsillectomy (as a child); heart catheterization (2007); shoulder arthroscopy (1991); pacemaker (1/13/2011); and ptca (08/2007). Social History/Living Environment:   Home Environment: Private residence  # Steps to Enter: 0 (has ramp on back porch to get in/out)  One/Two Story Residence: One story  Living Alone: No  Support Systems: Child(ian)  Patient Expects to be Discharged to[de-identified] Private residence  Current DME Used/Available at Home: Shower chair, Walker, rolling, Wheelchair  Prior Level of Function/Work/Activity:  Lives at home with daughter. Ambulates with RW independently.   Dominant Side:         RIGHT   Number of Personal Factors/Comorbidities that affect the Plan of Care: 3+: HIGH COMPLEXITY   EXAMINATION:   Most Recent Physical Functioning:   Gross Assessment:  AROM: Generally decreased, functional  Strength: Generally decreased, functional  Sensation: Intact (to light touch B LE's per patient report)               Posture:  Posture (WDL): Exceptions to WDL  Posture Assessment: Forward head, Rounded shoulders  Balance:  Sitting: Impaired  Sitting - Static: Prop sitting  Sitting - Dynamic: Prop sitting  Standing: Impaired  Standing - Static: Fair  Standing - Dynamic : Poor Bed Mobility:  Supine to Sit: Contact guard assistance  Sit to Supine: Minimum assistance; Moderate assistance  Scooting: Contact guard assistance  Wheelchair Mobility:     Transfers:  Sit to Stand: Contact guard assistance  Stand to Sit: Contact guard assistance  Bed to Chair: Minimum assistance  Gait:     Base of Support: Center of gravity altered  Speed/Divya: Slow  Step Length: Right shortened;Left shortened  Gait Abnormalities: Step to gait; Decreased step clearance  Distance (ft): 12 Feet (ft)  Assistive Device: Gait belt;Walker, rolling  Ambulation - Level of Assistance: Contact guard assistance;Minimal assistance       Body Structures Involved:  1. None Body Functions Affected:  1. Sensory/Pain  2. Neuromusculoskeletal  3. Movement Related  4. Skin Related Activities and Participation Affected:  1. Mobility  2. Self Care   Number of elements that affect the Plan of Care: 4+: HIGH COMPLEXITY   CLINICAL PRESENTATION:   Presentation: Evolving clinical presentation with changing clinical characteristics: MODERATE COMPLEXITY   CLINICAL DECISION MAKIN [facility] Inpatient Short Form  How much difficulty does the patient currently have. .. Unable A Lot A Little None   1. Turning over in bed (including adjusting bedclothes, sheets and blankets)? [ ] 1   [ ] 2   [X] 3   [ ] 4   2. Sitting down on and standing up from a chair with arms ( e.g., wheelchair, bedside commode, etc.)   [ ] 1   [ ] 2   [X] 3   [ ] 4   3. Moving from lying on back to sitting on the side of the bed? [ ] 1   [ ] 2   [X] 3   [ ] 4   How much help from another person does the patient currently need. .. Total A Lot A Little None   4.   Moving to and from a bed to a chair (including a wheelchair)? [ ] 1   [ ] 2   [X] 3   [ ] 4   5. Need to walk in hospital room? [ ] 1   [ ] 2   [X] 3   [ ] 4   6. Climbing 3-5 steps with a railing? [ ] 1   [X] 2   [ ] 3   [ ] 4   © 2007, Trustees of [address] [place] [address] [address] [address], under license to [employer]. All rights reserved    Score:  Initial: 17 Most Recent: X (Date: -- )     Interpretation of Tool:  Represents activities that are increasingly more difficult (i.e. Bed mobility, Transfers, Gait). Score 24 23 22-20 19-15 14-10 9-7 6       Modifier CH CI CJ CK CL CM CN         · Mobility - Walking and Moving Around:               - CURRENT STATUS:    CK - 40%-59% impaired, limited or restricted               - GOAL STATUS:           CJ - 20%-39% impaired, limited or restricted               - D/C STATUS:                       ---------------To be determined---------------  [place] / [place] / Product Type: Medicare /       Medical Necessity:     · Patient is expected to demonstrate progress in strength, range of motion, balance, coordination and functional technique to increase independence with   and improve safety during all functional mobility. Reason for Services/Other Comments:  · Patient continues to require skilled intervention due to recent falls and decreased safety. Use of outcome tool(s) and clinical judgement create a POC that gives a: Questionable prediction of patient's progress: MODERATE COMPLEXITY                 TREATMENT:   (In addition to Assessment/Re-Assessment sessions the following treatments were rendered)   Pre-treatment Symptoms/Complaints:    Pain: Initial:   Pain Intensity 1: 5  Pain Location 1: Head  Pain Intervention(s) 1: Ambulation/Increased Activity  Post Session:  Unchanged.       Assessment/Reassessment only, no treatment provided today     Braces/Orthotics/Lines/Etc:   · O2 Device: Room air  Treatment/Session Assessment:    · Response to Treatment:  Stable VS.  · Interdisciplinary Collaboration:  · Physical Therapist  · Registered Nurse  · After treatment position/precautions:  · Supine in bed  · Bed/Chair-wheels locked  · Bed in low position  · Call light within reach  · Family at bedside  · Side rails x 3  · Compliance with Program/Exercises: Will assess as treatment progresses. · Recommendations/Intent for next treatment session: \"Next visit will focus on advancements to more challenging activities and reduction in assistance provided\".   Total Treatment Duration:  PT Patient Time In/Time Out  Time In: 1010  Time Out: [address], PT

## 2017-08-07 NOTE — PROGRESS NOTES
Spiritual Care visit. Initial Visit.  visited with patient and family member at bedside. Patient, without her hearing aids, is exceptionally hard of hearing. Spoke with patient and family member as well as I could. Prayed for   Patient's recovery.     Visit by Jac Lawson M.Ed., Th.B. ,Staff

## 2017-08-07 NOTE — PROGRESS NOTES
Dual skin assessment with [employer]. Scattered bruising BUE. Purple, marcos, blanchable feet. Dry, fragile skin, Skin tear to LFA and laceration to right eye. Foam to sacrum.

## 2017-08-07 NOTE — PROGRESS NOTES
TRANSFER - OUT REPORT:    Verbal report given to TALHA Wen(name) on Lopez Mascorro  being transferred to 729(unit) for routine progression of care       Report consisted of patients Situation, Background, Assessment and   Recommendations(SBAR). Information from the following report(s) SBAR, ED Summary, Intake/Output, MAR and Recent Results was reviewed with the receiving nurse. Lines:   Peripheral IV 08/06/17 Left Antecubital (Active)   Site Assessment Clean, dry, & intact 8/7/2017 10:35 AM   Phlebitis Assessment 0 8/7/2017 10:35 AM   Infiltration Assessment 0 8/7/2017 10:35 AM   Dressing Status Clean, dry, & intact 8/7/2017 10:35 AM   Dressing Type Tape;Transparent 8/7/2017 10:35 AM        Opportunity for questions and clarification was provided.

## 2017-08-08 LAB
ANION GAP BLD CALC-SCNC: 9 MMOL/L (ref 7–16)
BUN SERPL-MCNC: 14 MG/DL (ref 8–23)
CALCIUM SERPL-MCNC: 9.4 MG/DL (ref 8.3–10.4)
CHLORIDE SERPL-SCNC: 108 MMOL/L (ref 98–107)
CO2 SERPL-SCNC: 29 MMOL/L (ref 21–32)
CREAT SERPL-MCNC: 0.95 MG/DL (ref 0.6–1)
ERYTHROCYTE [DISTWIDTH] IN BLOOD BY AUTOMATED COUNT: 14.7 % (ref 11.9–14.6)
GLUCOSE SERPL-MCNC: 79 MG/DL (ref 65–100)
HCT VFR BLD AUTO: 38.1 % (ref 35.8–46.3)
HGB BLD-MCNC: 12.3 G/DL (ref 11.7–15.4)
MCH RBC QN AUTO: 30 PG (ref 26.1–32.9)
MCHC RBC AUTO-ENTMCNC: 32.3 G/DL (ref 31.4–35)
MCV RBC AUTO: 92.9 FL (ref 79.6–97.8)
MM INDURATION POC: 0 MM (ref 0–5)
PLATELET # BLD AUTO: 184 K/UL (ref 150–450)
PMV BLD AUTO: 9.7 FL (ref 10.8–14.1)
POTASSIUM SERPL-SCNC: 3.5 MMOL/L (ref 3.5–5.1)
PPD POC: NEGATIVE NEGATIVE
RBC # BLD AUTO: 4.1 M/UL (ref 4.05–5.25)
SODIUM SERPL-SCNC: 146 MMOL/L (ref 136–145)
WBC # BLD AUTO: 5.1 K/UL (ref 4.3–11.1)

## 2017-08-08 PROCEDURE — 65660000000 HC RM CCU STEPDOWN

## 2017-08-08 PROCEDURE — 36415 COLL VENOUS BLD VENIPUNCTURE: CPT | Performed by: HOSPITALIST

## 2017-08-08 PROCEDURE — 97530 THERAPEUTIC ACTIVITIES: CPT

## 2017-08-08 PROCEDURE — 97166 OT EVAL MOD COMPLEX 45 MIN: CPT

## 2017-08-08 PROCEDURE — 85027 COMPLETE CBC AUTOMATED: CPT | Performed by: HOSPITALIST

## 2017-08-08 PROCEDURE — 80048 BASIC METABOLIC PNL TOTAL CA: CPT | Performed by: HOSPITALIST

## 2017-08-08 PROCEDURE — 74011250637 HC RX REV CODE- 250/637: Performed by: HOSPITALIST

## 2017-08-08 RX ADMIN — Medication 10 ML: at 05:38

## 2017-08-08 RX ADMIN — HYDROCODONE BITARTRATE AND ACETAMINOPHEN 1 TABLET: 5; 325 TABLET ORAL at 21:40

## 2017-08-08 RX ADMIN — CALCIUM CARBONATE 500 MG (1,250 MG)-VITAMIN D3 200 UNIT TABLET 1 TABLET: at 17:14

## 2017-08-08 RX ADMIN — CARVEDILOL 3.12 MG: 3.12 TABLET, FILM COATED ORAL at 08:46

## 2017-08-08 RX ADMIN — Medication 10 ML: at 21:38

## 2017-08-08 RX ADMIN — CARVEDILOL 3.12 MG: 3.12 TABLET, FILM COATED ORAL at 17:14

## 2017-08-08 RX ADMIN — MUPIROCIN: 20 OINTMENT TOPICAL at 17:15

## 2017-08-08 RX ADMIN — Medication 10 ML: at 13:35

## 2017-08-08 RX ADMIN — AMIODARONE HYDROCHLORIDE 100 MG: 200 TABLET ORAL at 08:47

## 2017-08-08 RX ADMIN — MUPIROCIN: 20 OINTMENT TOPICAL at 08:50

## 2017-08-08 RX ADMIN — ATORVASTATIN CALCIUM 10 MG: 10 TABLET, FILM COATED ORAL at 21:38

## 2017-08-08 RX ADMIN — CALCIUM CARBONATE 500 MG (1,250 MG)-VITAMIN D3 200 UNIT TABLET 1 TABLET: at 13:35

## 2017-08-08 RX ADMIN — GABAPENTIN 100 MG: 100 CAPSULE ORAL at 21:38

## 2017-08-08 RX ADMIN — FUROSEMIDE 20 MG: 20 TABLET ORAL at 08:47

## 2017-08-08 RX ADMIN — CALCIUM CARBONATE 500 MG (1,250 MG)-VITAMIN D3 200 UNIT TABLET 1 TABLET: at 08:47

## 2017-08-08 NOTE — PROGRESS NOTES
Care Management Interventions  PCP Verified by CM:  (Dr. Jenny Urbano)  Last Visit to PCP: 07/12/17  Mode of Transport at Discharge: BLS  Transition of Care Consult (CM Consult): Discharge Planning  Discharge Durable Medical Equipment: No  Physical Therapy Consult: Yes  Occupational Therapy Consult: Yes  Speech Therapy Consult: No  Current Support Network: Relative's Home, Family Lives Nearby  Confirm Follow Up Transport: Family  Plan discussed with Pt/Family/Caregiver: Yes  Freedom of Choice Offered: Yes  Discharge Location  Discharge Placement: Rehab Unit Subacute ([facility])    Pt is an 81 yo female admitted due to an [address] after a fall. Pt would benefit from STR services at discharge. SW met with pt/dtr to discuss choice of subacute rehab facility. They requested a referral to [facility]. Referral submitted requesting a bed for Wednesday and pt accepted for admission. SW notified pt/dtr of bed offer and plan and they are in agreement. PPD placed 8/6/17. SW following to facilitate pt's transfer to rehab at discharge.

## 2017-08-08 NOTE — PROGRESS NOTES
Zulay Jmienes CRITICAL CARE OUTREACH NURSE PROGRESS REPORT      SUBJECTIVE: Called to assess patient secondary to outreach protocol. MEWS Score: 2 (08/08/17 0700)  Vitals:    08/07/17 1906 08/07/17 2255 08/07/17 2357 08/08/17 0700   BP: 120/58  137/67 115/54   Pulse: 60  67 63   Resp: 20  20 20   Temp: 97.7 °F (36.5 °C)  98.3 °F (36.8 °C) 97.4 °F (36.3 °C)   SpO2: 97%  95% 96%   Weight:  48.2 kg (106 lb 4.2 oz)     Height:              LAB DATA:    Recent Labs      08/08/17 0517 08/07/17 0425 08/06/17 1905   NA  146*  145  144   K  3.5  4.0  5.1   CL  108*  108*  106   CO2  29  23  29   AGAP  9  14  9   GLU  79  82  98   BUN  14  15  17   CREA  0.95  0.97  1.09*   GFRAA  >60  >60  >60   GFRNA  59*  57*  50*   CA  9.4  9.9  10.1   ALB   --    --   3.3   TP   --    --   6.9   GLOB   --    --   3.6*   AGRAT   --    --   0.9*   ALT   --    --   11*        Recent Labs      08/08/17 0517 08/07/17 0425 08/06/17   1905   WBC  5.1  8.2  5.6   HGB  12.3  13.6  13.9   HCT  38.1  41.8  41.7   PLT  184  198  217          OBJECTIVE: On arrival to room, I found patient to be resting in bed. Pain Assessment  Pain Intensity 1: 0 (08/07/17 2225)  Pain Location 1: Head  Pain Intervention(s) 1: Medication (see MAR)  Patient Stated Pain Goal: 0                                 ASSESSMENT:  Pt resting in bed, [place], no hearing aids in. No neuro deficit, follows commands to loud verbal command. Bruising to R orbital area from previous fall. On RA, sat 92%, no family at bedside. Pt in NAD, no immediate needs at this time. PLAN:        Will continue to follow up per outreach protocol.     Signed By:   Stephon Natarajan RN    August 8, 2017 7:48 AM

## 2017-08-08 NOTE — PROGRESS NOTES
Problem: Self Care Deficits Care Plan (Adult)  Goal: *Acute Goals and Plan of Care (Insert Text)  1. Patient will complete full body bathing and dressing with setup assist and adaptive equipment as needed. 2. Patient will complete toileting with independence. 3. Patient will tolerate 25 minutes of OT treatment with as needed rest breaks to increase activity tolerance for ADLs. 4. Patient will complete functional transfers with modified independence and adaptive equipment as needed. 5. Patient will complete grooming tasks in standing at sink independently with no loss of balance. Timeframe: 7 visits       OCCUPATIONAL THERAPY: Initial Assessment, Treatment Day: Day of Assessment and PM 8/8/2017  INPATIENT: Hospital Day: 3  [place] / Product Type: Medicare /      NAME/AGE/GENDER: Darby Fox is a 80 y.o. female           PRIMARY DIAGNOSIS:  Intracranial hemorrhage after injury without loss of consciousness ([facility] [address] Intracranial hemorrhage after injury without loss of consciousness ([address] Intracranial hemorrhage after injury without loss of consciousness ([address]        ICD-10: Treatment Diagnosis:        · Generalized Muscle Weakness (M62.81)  · Other lack of cordination (R27.8)  · Difficulty in walking, Not elsewhere classified (R26.2)  · Repeated Falls (R29.6)  · History of falling (Z91.81)   Precautions/Allergies:        falls,  Celebrex [celecoxib] and Other medication       ASSESSMENT:      Ms. Ney Covarrubias presents supine in bed napping, no family present, easily awoken, agreeable to OT eval and getting up. Admitted with above after a fall at home while getting up off toilet. Alert and oriented except to place, but then self corrected when challenged. CGA to min A for bed mobility and sitting up to edge of bed. Prop sitting noted, but pt reports no pain. Completed MMT and rest of eval from this position. B UE are WFLs for basic self care tasks.   R hand has bandage and is swollen but pt still able to grasp items. Reports longstanding trouble with small buttons. Pt able to reach for drink on tray table without trouble. Sit to stand with CGA and took steps around the end of the bed with CGA to min A and then walked back around to starting side of bed. Returned to supine with min A for B LEs. Pt turned self to right sidelying and remained that way. All needs left in reach. Pt is functioning below her baseline and would benefit from skilled OT to maximize her independence with self care tasks and functional mobility as well as activity tolerance. Pt tells therapist she is going the [facility] tomorrow for more therapy. Agree that pt could benefit from more therapy. Will follow while in acute care. This section established at most recent assessment   PROBLEM LIST (Impairments causing functional limitations):  1. Decreased Strength  2. Decreased ADL/Functional Activities  3. Decreased Transfer Abilities  4. Decreased Ambulation Ability/Technique  5. Decreased Balance  6. Decreased Activity Tolerance  7. Decreased Pacing Skills  8. Decreased Work Simplification/Energy Conservation Techniques  9. Decreased Skin Integrity/Hygeine    INTERVENTIONS PLANNED: (Benefits and precautions of occupational therapy have been discussed with the patient.)  1. Activities of daily living training  2. Adaptive equipment training  3. Balance training  4. Clothing management  5. Cognitive training  6. Community reintergration  7. Donning&doffing training  8. Group therapy  9. Therapeutic activity  10. Therapeutic exercise  11. Safety training      TREATMENT PLAN: Frequency/Duration: Follow patient 3x per week to address above goals. Rehabilitation Potential For Stated Goals: GOOD      RECOMMENDED REHABILITATION/EQUIPMENT: (at time of discharge pending progress): Continue Skilled Therapy and Rehab.                       OCCUPATIONAL PROFILE AND HISTORY:   History of Present Injury/Illness (Reason for Referral):  Patient is 80years old female with multiple medical comorbidities who presented to ER as status post mechanical fall and head trauma. Pt was in the bathroom, was trying to get up from commode and lost her balance and fell forward hitting right forehead on the ground. She didn't lose consciousness, denied any chest pain, palpitations, lightheaded/dizziness, seizure like activity. Pt was attended by her , who called for EMS. Pt was conscious and ambulating. In ER, CT head showed multiple small foci of parenchymal hemorrhage right frontal and left parietal lobe along with small areas of subarachnoid hmg in right middle fossa, right frontal region and left parietal region. Past Medical History/Comorbidities:   Ms. Eric Bates  has a past medical history of Anemia of other chronic disease (4/26/2014); Anxiety; Arrhythmia; CAD (coronary artery disease) (20O7); Chronic systolic heart failure ([address] Coagulation disorder ([facility] [address] Congestive heart failure ([address] (08/2007); Contusion of chest wall (4/29/2012); Coronary artery disease involving autologous vein coronary bypass graft without angina pectoris (6/5/2015); Falls (4/23/2014); Generalized OA (6/5/2015); Heart failure ([address] Hip pain (4/26/2014); History of skin cancer (1992); [place] (hard of hearing); Hygroma (4/23/2014); Hyperglycemia (4/29/2012); Hyperlipidemia; Hypertension; Hypotension (4/23/2014); Mild dementia (12/3/2014); Nausea & vomiting; Neutrophilic leukocytosis (4/84/3084); Osteoarthritis; Osteoporosis; Osteoporosis (12/3/2014); Postmenopausal (12/3/2014); Postmenopausal bone loss; S/P cardiac pacemaker procedure (1/2011); S/P implantation of automatic cardioverter/defibrillator (AICD) (4/29/2012); S/P placement of cardiac pacemaker (4/29/2012); Shoulder fracture (1991); and Syncope (4/29/2012).   Ms. Eric Bates  has a past surgical history that includes biopsy of skin lesion (1992); bladder suspension (1995?); juan and bso (1984); tonsillectomy (as a child); heart catheterization (2007); shoulder arthroscopy (1991); pacemaker (1/13/2011); and ptca (08/2007). Social History/Living Environment:   Home Environment: Private residence  # Steps to Enter: 0 (has ramp on back porch to enter home)  One/Two Story Residence: One story  Living Alone: No  Support Systems: Child(ian)  Patient Expects to be Discharged to[de-identified] Rehabilitation facility  Current DME Used/Available at Home: Shower chair, Walker, rolling, Wheelchair  Tub or Shower Type: Tub/Shower combination ,showers with family help, sponge bathing mostly. Prior Level of Function/Work/Activity:  Reports lives with dtr and is independent with her own self care tasks, uses RW to get around, family completes all IADLS and driving. Reports has been in therapy recently \"working on walking again\". Dominant Side:         RIGHT   Number of Personal Factors/Comorbidities that affect the Plan of Care: Extensive review of physical, cognitive, and psychosocial performance (3+):  HIGH COMPLEXITY   ASSESSMENT OF OCCUPATIONAL PERFORMANCE[de-identified]   Activities of Daily Living:           Basic ADLs (From Assessment) Complex ADLs (From Assessment)   Basic ADL  Feeding: Setup, Additional time (R dominant, swollen R hand)  Oral Facial Hygiene/Grooming: Minimum assistance, Additional time  Bathing: Minimum assistance, Additional time  Upper Body Dressing: Minimum assistance, Additional time  Lower Body Dressing: Moderate assistance, Additional time  Toileting: Moderate assistance, Additional time Instrumental ADL  Meal Preparation: Total assistance (family completes)  Homemaking: Total assistance (family completes)  Medication Management: Setup (family sets up meds)  Financial Management: Total assistance (daughter manages)   Grooming/Bathing/Dressing Activities of Daily Living     Cognitive Retraining  Safety/Judgement: Awareness of environment; Fall prevention                 Functional Transfers  Toilet Transfer : Minimum assistance; Additional time; Adaptive equipment  Tub Transfer: Maximum assistance  Shower Transfer: Minimum assistance; Adaptive equipment; Additional time     Bed/Mat Mobility  Rolling: Contact guard assistance;Minimum assistance  Supine to Sit: Contact guard assistance; Additional time  Sit to Supine: Minimum assistance; Additional time  Sit to Stand: Contact guard assistance; Additional time  Bed to Chair: Minimum assistance; Additional time  Scooting: Contact guard assistance; Additional time          Most Recent Physical Functioning:   Gross Assessment:  AROM: Generally decreased, functional  Strength: Generally decreased, functional  Coordination: Generally decreased, functional  Sensation: Intact (to light touch,R hand bandaged and swollen)               Posture:  Posture (WDL): Exceptions to WDL  Posture Assessment: Forward head, Rounded shoulders  Balance:  Sitting: Impaired  Sitting - Static: Prop sitting  Sitting - Dynamic: Prop sitting  Standing: Impaired;Pull to stand; With support  Standing - Static: Fair  Standing - Dynamic : Poor Bed Mobility:  Rolling: Contact guard assistance;Minimum assistance  Supine to Sit: Contact guard assistance; Additional time  Sit to Supine: Minimum assistance; Additional time  Scooting: Contact guard assistance; Additional time  Wheelchair Mobility:     Transfers:  Sit to Stand: Contact guard assistance; Additional time  Stand to Sit: Contact guard assistance; Additional time  Bed to Chair: Minimum assistance; Additional time              Patient Vitals for the past 6 hrs:       BP BP Patient Position SpO2 Pulse   08/08/17 1115 111/56 At rest 97 % 60   08/08/17 1434 101/48 At rest 97 % 60        Mental Status  Neurologic State: Alert  Orientation Level: Oriented to person, Oriented to situation, Oriented to time, Disoriented to place (\"[place]\")  Cognition: Appropriate for age attention/concentration, Follows commands  Perception: Appears intact  Perseveration: No perseveration noted  Safety/Judgement: Awareness of environment, Fall prevention                               Physical Skills Involved:  1. Balance  2. Strength  3. Activity Tolerance  4. Pain (acute)  5. Edema  6. Skin Integrity Cognitive Skills Affected (resulting in the inability to perform in a timely and safe manner):  1. Perception  2. Executive Function  3. Immediate Memory  4. Short Term Recall  5. Sustained Attention  6. Divided Attention  7. Comprehension Psychosocial Skills Affected:  1. Habits/Routines  2. Environmental Adaptation  3. Social Interaction  4. Emotional Regulation  5. Self-Awareness  6. Awareness of Others  7. Social Roles   Number of elements that affect the Plan of Care: 5+:  HIGH COMPLEXITY   CLINICAL DECISION MAKING:   [facility] AM-PAC 6 Clicks   Daily Activity Inpatient Short Form  How much help from another person does the patient currently need. .. Total A Lot A Little None   1. Putting on and taking off regular lower body clothing?   [ ] 1   [X] 2   [ ] 3   [ ] 4   2. Bathing (including washing, rinsing, drying)? [ ] 1   [ ] 2   [X] 3   [ ] 4   3. Toileting, which includes using toilet, bedpan or urinal?   [ ] 1   [X] 2   [ ] 3   [ ] 4   4. Putting on and taking off regular upper body clothing?   [ ] 1   [ ] 2   [X] 3   [ ] 4   5. Taking care of personal grooming such as brushing teeth? [ ] 1   [ ] 2   [X] 3   [ ] 4   6. Eating meals? [ ] 1   [ ] 2   [ ] 3   [X] 4   © 2007, Trustees of [facility] [employer], under license to [employer]. All rights reserved    Score:  Initial: 17, completed 8/8/2017 Most Recent: X (Date: -- )     Interpretation of Tool:  Represents activities that are increasingly more difficult (i.e. Bed mobility, Transfers, Gait).        Score 24 23 22-20 19-15 14-10 9-7 6       Modifier CH CI CJ CK CL CM CN         · Self Care:               - CURRENT STATUS:    CK - 40%-59% impaired, limited or restricted  - GOAL STATUS:           CI - 1%-19% impaired, limited or restricted               - D/C STATUS:                       ---------------To be determined---------------  [place] / [place] / Product Type: Medicare /       Medical Necessity:     · Patient demonstrates good rehab potential due to higher previous functional level. Reason for Services/Other Comments:  · Patient continues to require skilled intervention due to s/p above and decreased ADLs and mobility. Use of outcome tool(s) and clinical judgement create a POC that gives a: MODERATE COMPLEXITY             TREATMENT:   (In addition to Assessment/Re-Assessment sessions the following treatments were rendered)      Pre-treatment Symptoms/Complaints:  \"I really am feeling better today. \"  Pain: Initial:   Pain Intensity 1: 0 (no complaint of pain during OT eval)  Post Session:  same      Therapeutic Activity: (    10 mins): Therapeutic activities including Bed transfers, Chair transfers, Toilet transfers, Ambulation on level ground, Carry objects and safety training to improve mobility, strength, balance, coordination and activity tolerance in ADLs and mobility. Required minimal   to promote dynamic balance in standing. Evaluation: 20 mins     Braces/Orthotics/Lines/Etc:   · O2 Device: Room air  Treatment/Session Assessment:    · Response to Treatment:  Tolerated well  · Interdisciplinary Collaboration:  · Occupational Therapist  · Registered Nurse  · Certified Nursing Assistant/Patient Care Technician  · After treatment position/precautions:  · Supine in bed  · Bed alarm/tab alert on  · Bed/Chair-wheels locked  · Bed in low position  · Call light within reach  · RN notified  · Side rails x 3  · Compliance with Program/Exercises: Will assess as treatment progresses. Compliant today. · Recommendations/Intent for next treatment session:   \"Next visit will focus on advancements to more challenging activities and reduction in assistance provided\".   Total Treatment Duration:  30 mins  OT Patient Time In/Time Out  Time In: 1400  Time Out: 46 Nicole Gomes, OT  Jorje Lopez MS, OTR/L

## 2017-08-08 NOTE — PROGRESS NOTES
Date of Outreach Update:  Natalie Farooq was seen and assessed. MEWS Score: 1 (08/08/17 1115)  Vitals:    08/07/17 2255 08/07/17 2357 08/08/17 0700 08/08/17 1115   BP:  137/67 115/54 111/56   Pulse:  67 63 60   Resp:  20 20 19   Temp:  98.3 °F (36.8 °C) 97.4 °F (36.3 °C) 97.9 °F (36.6 °C)   SpO2:  95% 96% 97%   Weight: 48.2 kg (106 lb 4.2 oz)      Height:             Pain Assessment  Pain Intensity 1: 0 (08/08/17 0845)  Pain Location 1: Head  Pain Intervention(s) 1: Medication (see MAR)  Patient Stated Pain Goal: 1      Previous Outreach assessment has been reviewed. There have been no significant clinical changes since the completion of the last dated Outreach assessment. Will continue to follow up per outreach protocol.     Signed By:   Avis Real RN    August 8, 2017 12:47 PM

## 2017-08-08 NOTE — PROGRESS NOTES
Hospitalist Progress Note     Admit Date:  2017  5:28 PM   Name:  Zuhair Hollis   Age:  80 y.o.  :  1928   MRN:  [id number]   PCP:  Karla Doran MD  Treatment Team: Attending Provider: Corazon Wilkinson MD; Care Manager: Harry Pineda, RN; Care Manager: Javier Cool LMSW    Subjective:   Patient is an 79 y/o F admitted to ICU for [address]. cardene gtt was ordered but pt was on it only briefly.  - pt says HA improved. No vision changes, focal weakness/numbness. No Cp, SOB    2017- pt seen - no adverse overnight  Events reported wants to go [place] but recommended for rehab. Rt hand pain but does not want pain meds      Objective:     Patient Vitals for the past 24 hrs:   Temp Pulse Resp BP SpO2   17 0700 97.4 °F (36.3 °C) 63 20 115/54 96 %   17 2357 98.3 °F (36.8 °C) 67 20 137/67 95 %   17 1906 97.7 °F (36.5 °C) 60 20 120/58 97 %   17 1316 97.3 °F (36.3 °C) 60 18 112/57 97 %   17 1200 - 62 20 109/56 97 %   17 1130 - 61 12 132/81 99 %   17 1116 - 62 (!) 48 136/69 97 %   17 1029 - 68 - 145/68 96 %   17 1001 - 60 26 147/66 99 %     Oxygen Therapy  O2 Sat (%): 96 % (17 0700)  Pulse via Oximetry: 62 beats per minute (17 1200)  O2 Device: Room air (17 0716)  No intake or output data in the 24 hours ending 17 0948      General:    Well nourished. Alert. Bruising with stitches to the rt upper eye  CV:   RRR. No murmur, rub, or gallop. Lungs:   Clear to auscultation bilaterally. No wheezing, rhonchi, or rales. Abdomen:   Soft, nontender, nondistended. Extremities: Warm and dry. No cyanosis or edema. Skin:     No rashes or jaundice. Data Review:  I have reviewed all labs, meds, telemetry events, and studies from the last 24 hours.     Recent Results (from the past 24 hour(s))   PLEASE READ & DOCUMENT PPD TEST IN 24 HRS    Collection Time: 17  9:34 PM   Result Value Ref Range    PPD negative Negative    mm Induration 0 mm   METABOLIC PANEL, BASIC    Collection Time: 08/08/17  5:17 AM   Result Value Ref Range    Sodium 146 (H) 136 - 145 mmol/L    Potassium 3.5 3.5 - 5.1 mmol/L    Chloride 108 (H) 98 - 107 mmol/L    CO2 29 21 - 32 mmol/L    Anion gap 9 7 - 16 mmol/L    Glucose 79 65 - 100 mg/dL    BUN 14 8 - 23 MG/DL    Creatinine 0.95 0.6 - 1.0 MG/DL    GFR est AA >60 >60 ml/min/1.73m2    GFR est non-AA 59 (L) >60 ml/min/1.73m2    Calcium 9.4 8.3 - 10.4 MG/DL   CBC W/O DIFF    Collection Time: 08/08/17  5:17 AM   Result Value Ref Range    WBC 5.1 4.3 - 11.1 K/uL    RBC 4.10 4.05 - 5.25 M/uL    HGB 12.3 11.7 - 15.4 g/dL    HCT 38.1 35.8 - 46.3 %    MCV 92.9 79.6 - 97.8 FL    MCH 30.0 26.1 - 32.9 PG    MCHC 32.3 31.4 - 35.0 g/dL    RDW 14.7 (H) 11.9 - 14.6 %    PLATELET 288 637 - 480 K/uL    MPV 9.7 (L) 10.8 - 14.1 FL        All Micro Results     Procedure Component Value Units Date/Time    MRSA SCREEN - PCR (NASAL) [[id number]]  (Abnormal) Collected:  08/06/17 2121    Order Status:  Completed Specimen:  Nasal from Nasal Updated:  08/06/17 2310     Special Requests: NO SPECIAL REQUESTS        Culture result:         MRSA target DNA is detected (presumptive positive for MRSA colonization).  (A)            RESULTS VERIFIED, PHONED TO AND READ BACK BY  JOSE PALENCIA AT [id number] ON 8/6/17 CF            Current Meds:  Current Facility-Administered Medications   Medication Dose Route Frequency    hydrALAZINE (APRESOLINE) 20 mg/mL injection 20 mg  20 mg IntraVENous Q6H PRN    mupirocin (BACTROBAN) 2 % ointment   Both Nostrils BID    PPD Read Reminder  1 Each Other Q24H    amiodarone (CORDARONE) tablet 100 mg  100 mg Oral DAILY    atorvastatin (LIPITOR) tablet 10 mg  10 mg Oral QHS    calcium-vitamin D (OS-SUN) 500 mg-200 unit tablet  1 Tab Oral TID WITH MEALS    carvedilol (COREG) tablet 3.125 mg  3.125 mg Oral BID WITH MEALS    diclofenac (VOLTAREN) 1 % topical gel   Topical QID    furosemide (LASIX) tablet 20 mg  20 mg Oral DAILY    gabapentin (NEURONTIN) capsule 100 mg  100 mg Oral QHS    HYDROcodone-acetaminophen (NORCO) 5-325 mg per tablet 1 Tab  1 Tab Oral Q4H PRN    nitroglycerin (NITROSTAT) tablet 0.4 mg  0.4 mg SubLINGual PRN    sodium chloride (NS) flush 5-10 mL  5-10 mL IntraVENous Q8H    sodium chloride (NS) flush 5-10 mL  5-10 mL IntraVENous PRN    acetaminophen (TYLENOL) tablet 650 mg  650 mg Oral Q6H PRN    morphine injection 1 mg  1 mg IntraVENous Q4H PRN    naloxone (NARCAN) injection 0.4 mg  0.4 mg IntraVENous PRN    ondansetron (ZOFRAN) injection 4 mg  4 mg IntraVENous Q4H PRN    magnesium hydroxide (MILK OF MAGNESIA) 400 mg/5 mL oral suspension 30 mL  30 mL Oral DAILY PRN       Other Studies (last 24 hours):  No results found.     Assessment and Plan:     Hospital Problems as of 8/8/2017  Date Reviewed: 7/12/2017          Codes Class Noted - Resolved POA    Bilateral hearing loss (Chronic) ICD-10-CM: H91.93  ICD-9-CM: 389.9  8/7/2017 - Present Yes        * (Principal)Intracranial hemorrhage after injury without loss of consciousness ([facility] [address] ICD-10-CM: S06.300A  ICD-9-CM: 853.01  8/6/2017 - Present Yes        Chronic systolic congestive heart failure (HCC) (Chronic) ICD-10-CM: I50.22  ICD-9-CM: 428.22, 428.0  5/24/2016 - Present Yes        S/P implantation of automatic cardioverter/defibrillator (AICD) (Chronic) ICD-10-CM: Z95.810  ICD-9-CM: V45.02  4/29/2012 - Present Yes              PLAN:    · ICH stable on repeat head CT  · BP stable, cont meds as listed  · Doing pt   · Rehab recommended- awaiting placement  · PT/OT/SW consulted    DC planning/Dispo:  Ppd placed  DVT ppx:  SCDs  Plan discussed with:  pt    Signed:  Lynn Auguste MD

## 2017-08-09 VITALS
WEIGHT: 106.26 LBS | DIASTOLIC BLOOD PRESSURE: 61 MMHG | OXYGEN SATURATION: 98 % | BODY MASS INDEX: 18.83 KG/M2 | HEART RATE: 60 BPM | HEIGHT: 63 IN | SYSTOLIC BLOOD PRESSURE: 103 MMHG | RESPIRATION RATE: 20 BRPM | TEMPERATURE: 98 F

## 2017-08-09 LAB
ANION GAP BLD CALC-SCNC: 6 MMOL/L (ref 7–16)
BUN SERPL-MCNC: 18 MG/DL (ref 8–23)
CALCIUM SERPL-MCNC: 9.1 MG/DL (ref 8.3–10.4)
CHLORIDE SERPL-SCNC: 106 MMOL/L (ref 98–107)
CO2 SERPL-SCNC: 31 MMOL/L (ref 21–32)
CREAT SERPL-MCNC: 1.14 MG/DL (ref 0.6–1)
ERYTHROCYTE [DISTWIDTH] IN BLOOD BY AUTOMATED COUNT: 14.7 % (ref 11.9–14.6)
GLUCOSE SERPL-MCNC: 89 MG/DL (ref 65–100)
HCT VFR BLD AUTO: 34.9 % (ref 35.8–46.3)
HGB BLD-MCNC: 11.3 G/DL (ref 11.7–15.4)
MCH RBC QN AUTO: 30.1 PG (ref 26.1–32.9)
MCHC RBC AUTO-ENTMCNC: 32.4 G/DL (ref 31.4–35)
MCV RBC AUTO: 93.1 FL (ref 79.6–97.8)
PLATELET # BLD AUTO: 175 K/UL (ref 150–450)
PMV BLD AUTO: 9.5 FL (ref 10.8–14.1)
POTASSIUM SERPL-SCNC: 3.4 MMOL/L (ref 3.5–5.1)
RBC # BLD AUTO: 3.75 M/UL (ref 4.05–5.25)
SODIUM SERPL-SCNC: 143 MMOL/L (ref 136–145)
WBC # BLD AUTO: 5.1 K/UL (ref 4.3–11.1)

## 2017-08-09 PROCEDURE — 85027 COMPLETE CBC AUTOMATED: CPT | Performed by: HOSPITALIST

## 2017-08-09 PROCEDURE — 36415 COLL VENOUS BLD VENIPUNCTURE: CPT | Performed by: HOSPITALIST

## 2017-08-09 PROCEDURE — 80048 BASIC METABOLIC PNL TOTAL CA: CPT | Performed by: HOSPITALIST

## 2017-08-09 PROCEDURE — 74011250637 HC RX REV CODE- 250/637: Performed by: HOSPITALIST

## 2017-08-09 RX ORDER — MUPIROCIN 20 MG/G
OINTMENT TOPICAL 2 TIMES DAILY
Qty: 22 G | Refills: 0 | Status: SHIPPED | OUTPATIENT
Start: 2017-08-09 | End: 2017-08-19

## 2017-08-09 RX ORDER — HYDROCODONE BITARTRATE AND ACETAMINOPHEN 5; 325 MG/1; MG/1
1 TABLET ORAL
Qty: 10 TAB | Refills: 0 | Status: SHIPPED | OUTPATIENT
Start: 2017-08-09 | End: 2017-09-25 | Stop reason: ALTCHOICE

## 2017-08-09 RX ORDER — LORAZEPAM 1 MG/1
1 TABLET ORAL
Qty: 14 TAB | Refills: 0 | Status: SHIPPED | OUTPATIENT
Start: 2017-08-09 | End: 2017-09-25 | Stop reason: SDUPTHER

## 2017-08-09 RX ADMIN — CALCIUM CARBONATE 500 MG (1,250 MG)-VITAMIN D3 200 UNIT TABLET 1 TABLET: at 08:48

## 2017-08-09 RX ADMIN — Medication 10 ML: at 05:33

## 2017-08-09 RX ADMIN — MUPIROCIN: 20 OINTMENT TOPICAL at 08:57

## 2017-08-09 RX ADMIN — CARVEDILOL 3.12 MG: 3.12 TABLET, FILM COATED ORAL at 08:48

## 2017-08-09 NOTE — PROGRESS NOTES
Date of Outreach Update:  Isabella Washington was seen and assessed. MEWS Score: 2 (08/09/17 0739)  Vitals:    08/08/17 2010 08/09/17 0002 08/09/17 0402 08/09/17 0739   BP: 108/48 120/59 97/45 95/47   Pulse: 60 61 62 65   Resp: 18 17 16 19   Temp: 97.7 °F (36.5 °C) 98.3 °F (36.8 °C) 97.6 °F (36.4 °C) 98 °F (36.7 °C)   SpO2: 97% 96% 96% 94%   Weight:       Height:             Pain Assessment  Pain Intensity 1: 0 (08/09/17 0054)  Pain Location 1: Head  Pain Intervention(s) 1: Medication (see MAR)  Patient Stated Pain Goal: 0      Previous Outreach assessment has been reviewed. There have been no significant clinical changes since the completion of the last dated Outreach assessment. Will continue to follow up per outreach protocol.     Signed By:   Steven Gilbert RN    August 9, 2017 9:53 AM

## 2017-08-09 NOTE — PROGRESS NOTES
Pt was medically cleared for discharge today and transferred to [facility] for [address] services. Orders were faxed to the facility prior to discharge and originals were forwarded to facility via [facility] transport staff. Pt's dtr was at the [facility] at the time of pt's discharge and transport.

## 2017-08-09 NOTE — DISCHARGE SUMMARY
Hospitalist Discharge Summary     Patient ID:  Waldemar Barnett  [id number]  52 y.o.  5/30/1928  Admit date: 8/6/2017  5:28 PM  Discharge date and time: 8/9/2017  Attending: Eulalia Apley, MD  PCP:  Balaji Adams MD  Treatment Team: Attending Provider: Eulalia Apley, MD; Care Manager: General Record, RN; Care Manager: Yulissa Pineda LMSW; Consulting Provider: Minna Mcrae MD    Principal Diagnosis Intracranial hemorrhage after injury without loss of consciousness [facility])   Principal Problem:    Intracranial hemorrhage after injury without loss of consciousness ([facility] [address] (8/6/2017)    Active Problems:    S/P implantation of automatic cardioverter/defibrillator (AICD) (4/29/2012)      Chronic systolic congestive heart failure ([facility] [address] (5/24/2016)      Bilateral hearing loss (8/7/2017)             Hospital Course:  Please refer to the admission H&P for details of presentation. In summary, the patient is A 80year old female with multiple medical comorbidities who presented to ER as status post mechanical fall and head trauma. Pt was in the bathroom,  trying to get up from commode and lost her balance and fell forward hitting right forehead on the ground. She didn't lose consciousness, denied any chest pain, palpitations, lightheaded/dizziness, seizure like activity. Pt was attended by her , who called for EMS. Pt was conscious and ambulating. In ER, CT head showed multiple small foci of parenchymal hemorrhage right frontal and left parietal lobe along with small areas of subarachnoid hmg in right middle fossa, right frontal region and left parietal region. Pt. Had been on both Plavix and ASA and Dr. Em Lin, ER attending discussed pt. With both Neurosurgery attending and Cardiology attending Dr. Ruth Richard and Dr. Araseli Celis, respectively and both were in agreement that Plavix and ASA should be discontinued. Dr. Ruth Richard added that there was no indication for surgical intervention of bleeding.  Pt. Continued to recover well in hospital and is stable to be discharged to [facility] today. Significant Diagnostic Studies: CT Head x 2. Labs: Results:       Chemistry Recent Labs      08/09/17 0520 08/08/17 0517 08/07/17 0425  08/06/17   1905   GLU  89  79  82  98   NA  143  146*  145  144   K  3.4*  3.5  4.0  5.1   CL  106  108*  108*  106   CO2  31  29  23  29   BUN  18  14  15  17   CREA  1.14*  0.95  0.97  1.09*   CA  9.1  9.4  9.9  10.1   AGAP  6*  9  14  9   AP   --    --    --   293*   TP   --    --    --   6.9   ALB   --    --    --   3.3   GLOB   --    --    --   3.6*   AGRAT   --    --    --   0.9*      CBC w/Diff Recent Labs      08/09/17 0520 08/08/17 0517 08/07/17   0425   WBC  5.1  5.1  8.2   RBC  3.75*  4.10  4.49   HGB  11.3*  12.3  13.6   HCT  34.9*  38.1  41.8   PLT  175  184  198      Cardiac Enzymes No results for input(s): CPK, CKND1, ERNIE in the last 72 hours. No lab exists for component: CKRMB, TROIP   Coagulation Recent Labs      08/06/17 1905   PTP  10.7   INR  1.0       Lipid Panel Lab Results   Component Value Date/Time    Cholesterol, total 167 01/10/2017 10:51 AM    HDL Cholesterol 77 01/10/2017 10:51 AM    LDL, calculated 74 01/10/2017 10:51 AM    VLDL, calculated 16 01/10/2017 10:51 AM    Triglyceride 82 01/10/2017 10:51 AM      BNP No results for input(s): BNPP in the last 72 hours. Liver Enzymes Recent Labs      08/06/17 1905   TP  6.9   ALB  3.3   AP  293*   SGOT  21      Thyroid Studies Lab Results   Component Value Date/Time    T4, Total 10.2 03/31/2016 08:56 AM    TSH 3.880 03/31/2016 08:56 AM            Discharge Exam:  Visit Vitals    BP 95/47 (BP 1 Location: Right arm, BP Patient Position: At rest)    Pulse 65    Temp 98 °F (36.7 °C)    Resp 19    Ht 5' 3\" (1.6 m)    Wt 48.2 kg (106 lb 4.2 oz)    SpO2 94%    Breastfeeding No    BMI 18.82 kg/m2     General appearance: WNWD. A&O x 3. NAD. Sitting up in bed.   Lungs: clear to auscultation bilaterally  Heart: regular rate and rhythm, S1, S2 normal, no murmur, click, rub or gallop  Abdomen: soft, non-tender. Bowel sounds normal. No masses,  no organomegaly  Extremities: no cyanosis or edema  Neurologic: No obvious focal deficits. Disposition: STR  Discharge Condition: stable  Patient Instructions:   Current Discharge Medication List      START taking these medications    Details   mupirocin (BACTROBAN) 2 % ointment by Both Nostrils route two (2) times a day for 10 days. Qty: 22 g, Refills: 0         CONTINUE these medications which have CHANGED    Details   HYDROcodone-acetaminophen (NORCO) 5-325 mg per tablet Take 1 Tab by mouth every six (6) hours as needed. Max Daily Amount: 4 Tabs. Qty: 10 Tab, Refills: 0      LORazepam (ATIVAN) 1 mg tablet Take 1 Tab by mouth every twelve (12) hours as needed for Anxiety. Max Daily Amount: 2 mg. Qty: 14 Tab, Refills: 0    Associated Diagnoses: MELANIA (generalized anxiety disorder)         CONTINUE these medications which have NOT CHANGED    Details   nitroglycerin (NITROSTAT) 0.4 mg SL tablet 1 Tab by SubLINGual route every five (5) minutes as needed. Qty: 25 Tab, Refills: 3      furosemide (LASIX) 20 mg tablet One tab daily as needed  Qty: 90 Tab, Refills: 2    Associated Diagnoses: Chronic systolic heart failure (HCC)      carvedilol (COREG) 3.125 mg tablet Take 1 Tab by mouth two (2) times daily (with meals). Qty: 60 Tab, Refills: 5    Associated Diagnoses: Chronic systolic heart failure (HCC)      docusate sodium (COLACE) 100 mg capsule Take 1 Cap by mouth two (2) times a day.  Indications: prevent constipation  Qty: 60 Cap, Refills: 0      atorvastatin (LIPITOR) 10 mg tablet TAKE ONE TABLET BY MOUTH ONE TIME DAILY  Qty: 30 Tab, Refills: 5      amiodarone (CORDARONE) 200 mg tablet TAKE ONE-HALF TABLET BY MOUTH ONE TIME DAILY  Qty: 15 Tab, Refills: 5      calcium-vitamin D (OYSTER SHELL) 500 mg(1,250mg) -200 unit per tablet Take 1 Tab by mouth three (3) times daily (with meals). Qty: 90 Tab, Refills: 0      ERGOCALCIFEROL, VITAMIN D2, (VITAMIN D2 PO) Take  by mouth.      gabapentin (NEURONTIN) 100 mg capsule TAKE ONE CAPSULE BY MOUTH AT BEDTIME  Refills: 0      albuterol (PROVENTIL HFA, VENTOLIN HFA, PROAIR HFA) 90 mcg/actuation inhaler Take 1 Puff by inhalation every four (4) hours as needed for Wheezing. Qty: 1 Inhaler, Refills: 1    Associated Diagnoses: Acute bronchitis, unspecified organism         STOP taking these medications       clopidogrel (PLAVIX) 75 mg tab Comments:   Reason for Stopping:         diclofenac (VOLTAREN) 1 % gel Comments:   Reason for Stopping:         aspirin (ASPIRIN) 325 mg tablet Comments:   Reason for Stopping:               Activity: Per Rehab Facility  Diet: Cardiac Diet      Follow-up: Pt. Will follow up with her PMD as well as her cardiologist in 1-2 weeks.     Time spent to discharge patient greater than 30 minutes  Signed:  Yared Gallego  8/9/2017  10:23 AM

## 2017-08-09 NOTE — PROGRESS NOTES
TRANSFER - OUT REPORT:    Verbal report given to Spotigo (name) on Waldemar Barnett  being transferred to [facility] room 329 (unit) for routine progression of care       Report consisted of patients Situation, Background, Assessment and   Recommendations(SBAR). Information from the following report(s) SBAR, Kardex, Intake/Output, MAR and Recent Results was reviewed with the receiving nurse. Lines:       Opportunity for questions and clarification was provided.       Patient transported with:   [facility]

## 2017-08-10 ENCOUNTER — PATIENT OUTREACH (OUTPATIENT)
Dept: CASE MANAGEMENT | Age: 82
End: 2017-08-10

## 2017-08-10 NOTE — PROGRESS NOTES
JEANNINE outreach postponed for 21 days due to discharge to non-preferred network SNF. This note will not be viewable in [address]

## 2017-08-22 NOTE — PROGRESS NOTES
Real Crooked  : [id number] [facility] [address]  [address] [place] [address].  [phone]   [phone]        OUTPATIENT PHYSICAL THERAPY:Discontinuation Summary 2017      ICD-10: Treatment Diagnosis: Pain in right hip (M25.551)  Difficulty in walking, not elsewhere classified (R26.2)  Precautions/Allergies:   Celebrex [celecoxib] and Other medication   Fall Risk Score: 6 (? 5 = High Risk)  MD Orders: s/p Right IT Fracture: Strengthen Hip abductors and quad, gait training WBAT MEDICAL/REFERRING DIAGNOSIS:  Hip fracture, right ([facility] [address] [S72.001A]   DATE OF ONSET: surgery 17  REFERRING PHYSICIAN: Anju Renae MD  RETURN PHYSICIAN APPOINTMENT: Aug 2017     INITIAL ASSESSMENT:  Ms. Silvia Crawford presents with R hip pain and difficulty walking s/p ORIF for IT fracture. Pt has very poor balance and requires rolling walker for gait. Pt has decreased LE strength weaker with R hip and decreased ROM with R hip and knee. Pt will benefit from PT for strengthening, ROM, balance and gait training. Pt has increased difficulty with transfers and requires some assist with ADLs. Pt will benefit from beginning in aquatic environment for ease of movement and safety with challenging pt's balance and then will progress towards further land based strengthening, balance, and gait training. Pt's last visit was 17. She then had to discontinue outpatient PT 17 due to a pelvic fracture according to a message from our front office staff. Pt is going to need home health PT at this time. Pt had performed aquatic PT and was just transitioning to land therapy for LE strength, gait, and balance. Pt had minimal c/o of hip at time of last visit. PROBLEM LIST (Impacting functional limitations):  1. Decreased Strength  2. Decreased ADL/Functional Activities  3. Decreased Transfer Abilities  4. Decreased Ambulation Ability/Technique  5. Decreased Balance  6.  Increased Pain  7. Decreased Activity Tolerance  8. Decreased Flexibility/Joint Mobility  9. Decreased [place] with Home Exercise Program INTERVENTIONS PLANNED:  1. Balance Exercise  2. Cold  3. Gait Training  4. Heat  5. Home Exercise Program (HEP)  6. Manual Therapy  7. Neuromuscular Re-education/Strengthening  8. Range of Motion (ROM)  9. Therapeutic Activites  10. Therapeutic Exercise/Strengthening  11. Transfer Training  12. aquatics   TREATMENT PLAN:  Effective Dates: 5/23/17 TO 8/15/17. Frequency/Duration: 2-3 times a week for 12 weeks  GOALS: (Goals have been discussed and agreed upon with patient.)  Short-Term Functional Goals: Time Frame: 4 weeks  1. Pt will independent with HEP. (in progress )  2. Pt will increase R hip at least 10 ° in each plane for increased mobility with dressing. (MET )  3. Pt will be able to participate in aquatic PT for at least 45 minutes without increased symptoms to improve mobility, gait, and strength. (MET )  4. Pt will be able to stand and balance for at least 30 seconds without her walker. (NOT MET )  5. Pt will be able to safely navigate up/down a curb with walker with SBA. (NOT MET )  Discharge Goals: Time Frame: 12 weeks  1. Pt will be able to ambulate at least 600 ft with rolling walker safely. (NOT MET )  2. Pt will be able to navigate up/down at least 4 stairs with railing safely. (NOT MET)  3. Pt will be able to increase BLE strength at least 4/5 or greater for increased functional strength for squatting, gait, and stairs. (NOT MET)  4. Pt will be able to  romberg stance for at least 30 seconds noting increased balance. (NOT MET)  5. Pt will be able to reach overhead with both UEs while standing without UE support as if reaching into a cabinet without loss of balance.  (NOT MET)  Rehabilitation Potential For Stated Goals: Excellent                  HISTORY:   History of Present Injury/Illness (Reason for Referral):  Pt reports falling for unknown reason in her bathroom Sat Feb 18 and causing R hip IT fracture. Pt underwent ORIF Sun Feb 20, 2017. Pt was discharged to the [facility] around the 2/23/17 and went home around mid April. Pt then had home health PT and was discharged to come to outpatient. Pt would like to be able to regain balance and strength to walk better. Pt states she is walking around her home but coming into PT today was the largest distance she has walked out of her home. Past Medical History/Comorbidities:   Ms. Nguyễn Brody  has a past medical history of Anemia of other chronic disease (4/26/2014); Anxiety; Arrhythmia; CAD (coronary artery disease) (20O7); Chronic systolic heart failure ([facility] [address] Coagulation disorder ([facility] [address] Congestive heart failure ([facility] [address] (08/2007); Contusion of chest wall (4/29/2012); Coronary artery disease involving autologous vein coronary bypass graft without angina pectoris (6/5/2015); Falls (4/23/2014); Generalized OA (6/5/2015); Heart failure ([facility] [address] Hip pain (4/26/2014); History of skin cancer (1992); [place] (hard of hearing); Hygroma (4/23/2014); Hyperglycemia (4/29/2012); Hyperlipidemia; Hypertension; Hypotension (4/23/2014); Mild dementia (12/3/2014); Nausea & vomiting; Neutrophilic leukocytosis (2/21/2450); Osteoarthritis; Osteoporosis; Osteoporosis (12/3/2014); Postmenopausal (12/3/2014); Postmenopausal bone loss; S/P cardiac pacemaker procedure (1/2011); S/P implantation of automatic cardioverter/defibrillator (AICD) (4/29/2012); S/P placement of cardiac pacemaker (4/29/2012); Shoulder fracture (1991); and Syncope (4/29/2012). Ms. Nguyễn Brody  has a past surgical history that includes biopsy of skin lesion (1992); bladder suspension (1995?); juan and bso (1984); tonsillectomy (as a child); heart catheterization (2007); shoulder arthroscopy (1991); pacemaker (1/13/2011); and ptca (08/2007).   Social History/Living Environment:    lives with daughter in one story home with ramp  Prior Level of Function/Work/Activity:  Independent with ADLs, Used walker intermittently prior to fall; had assist of daughter for cooking and cleaning  Current Medications:    Current Outpatient Prescriptions:     HYDROcodone-acetaminophen (NORCO) 5-325 mg per tablet, Take 1 Tab by mouth every six (6) hours as needed. Max Daily Amount: 4 Tabs., Disp: 10 Tab, Rfl: 0    LORazepam (ATIVAN) 1 mg tablet, Take 1 Tab by mouth every twelve (12) hours as needed for Anxiety. Max Daily Amount: 2 mg., Disp: 14 Tab, Rfl: 0    gabapentin (NEURONTIN) 100 mg capsule, TAKE ONE CAPSULE BY MOUTH AT BEDTIME, Disp: , Rfl: 0    nitroglycerin (NITROSTAT) 0.4 mg SL tablet, 1 Tab by SubLINGual route every five (5) minutes as needed. , Disp: 25 Tab, Rfl: 3    furosemide (LASIX) 20 mg tablet, One tab daily as needed, Disp: 90 Tab, Rfl: 2    carvedilol (COREG) 3.125 mg tablet, Take 1 Tab by mouth two (2) times daily (with meals). , Disp: 60 Tab, Rfl: 5    docusate sodium (COLACE) 100 mg capsule, Take 1 Cap by mouth two (2) times a day. Indications: prevent constipation, Disp: 60 Cap, Rfl: 0    atorvastatin (LIPITOR) 10 mg tablet, TAKE ONE TABLET BY MOUTH ONE TIME DAILY, Disp: 30 Tab, Rfl: 5    amiodarone (CORDARONE) 200 mg tablet, TAKE ONE-HALF TABLET BY MOUTH ONE TIME DAILY, Disp: 15 Tab, Rfl: 5    calcium-vitamin D (OYSTER SHELL) 500 mg(1,250mg) -200 unit per tablet, Take 1 Tab by mouth three (3) times daily (with meals). , Disp: 90 Tab, Rfl: 0    albuterol (PROVENTIL HFA, VENTOLIN HFA, PROAIR HFA) 90 mcg/actuation inhaler, Take 1 Puff by inhalation every four (4) hours as needed for Wheezing., Disp: 1 Inhaler, Rfl: 1    ERGOCALCIFEROL, VITAMIN D2, (VITAMIN D2 PO), Take  by mouth., Disp: , Rfl:    Date Last Reviewed:  7/6/2017   EXAMINATION:   Observation/Orthostatic Postural Assessment:           Forward flexed posture with rounded shoulders and forward head posture  Palpation:          Minimal tenderness R lateral hip with only minor scar tissue noted over incision; incision healed well  ROM:       Date:  5/23/17 Date:  06/23/17 Date:     LE ROM Left Right       Hip Flexion 120 ° 100 ° 120 ° 110 ° AAROM     Hip Abduction 20 ° 10 ° 20 ° 20 °     Straight Leg Raise (SLR)  65 °                Knee Flexion 140 ° 130 ° WFL WFL     Knee Extension 0 ° 0 ° [facility] WF       Strength:     Date:  5/23/17 Date:  06/23/17 Date:     LE MMT Left Right Left Right Left Right   Hip Flex (L1/L2) 4/5 3-/5  3/5     Hip Abd (glut med) 4-/5 2+/5  3-/5     Hip Ext  Not tested       Quad    ( L3/4) 4/5 4-/5  4-/5     Hamstring 4/5 4-/5  4-/5     Anterior Tib (L4/L5) 3/5 3-/5  3/5     Gastroc (S1/2) 3-/5 2+/5  3/5       Special Tests: deferred  Neurological Screen:        Sensation: intact to light touch but reports intermittent numbness/tingling bilateral feet  Functional Mobility:         Gait/Ambulation:  Using rolling walker with SBA to mod I with step through gait-as fatigue begins to shuffle        Transfers:  modified independent with transfer sit to stand from standard chair        Bed Mobility:  Increased effort and time but independent   Balance:          Poor; unable to let go of the walker during standing   Body Structures Involved:  1. Nerves  2. Bones  3. Joints  4. Muscles  5. Ligaments Body Functions Affected:  1. Sensory/Pain  2. Neuromusculoskeletal  3. Movement Related Activities and Participation Affected:  1. General Tasks and Demands  2. Mobility  3. Self Care  4. Domestic Life  5. Community, Social and Civic Life   CLINICAL PRESENTATION:   CLINICAL DECISION MAKING:   Outcome Measure: Tool Used: Lower Extremity Functional Scale (LEFS)  Score:  Initial: 12/80  Most Recent: 15/80 (Date: 06/23/17 )   Interpretation of Score: 20 questions each scored on a 5 point scale with 0 representing \"extreme difficulty or unable to perform\" and 4 representing \"no difficulty\". The lower the score, the greater the functional disability. 80/80 represents no disability. Minimal detectable change is 9 points.   Score 80 79-63 62-48 47-32 31-16 15-1 0   Modifier CH CI CJ CK CL CM CN     ? Mobility - Walking and Moving Around:     - CURRENT STATUS: CM - 80%-99% impaired, limited or restricted    - GOAL STATUS: CK - 40%-59% impaired, limited or restricted    - D/C STATUS:  ---------------To be determined---------------    Medical Necessity:   · Patient is expected to demonstrate progress in strength, range of motion, balance and functional technique to decrease assistance required with gait and ADLs and improve safety during gait. Reason for Services/Other Comments:  · Patient continues to require skilled intervention due to having difficulty walking and requires further PT to advance exercises for balance, strengthening, and gait. TREATMENT:   (In addition to Assessment/Re-Assessment sessions the following treatments were rendered)    Therapeutic Exercise: (see flow sheet below for minutes) ):  Exercises per grid below to improve mobility and strength. Required moderate visual, verbal and tactile cues to promote proper body alignment. Aquatic Therapy (see flow sheet below for minutes): Aquatic treatment performed per flow grid for Decreased muscle strength, Decreased static/dynamic balance and reactive control, Decreased range of motion and Ease of movement. Cues provided for technique and posture. Assistance by therapist provided for balance. Patient has difficulty with balance. Date: 5/23/17 6/6/17 6/9/17 6/12/17 6/15/17 06/23/17 6/27/17 6/29/17 7/3/17 07/06/17   Modalities:  Defibrillator/Pacemaker                                       Manual Therapy:                                       Aquatic Exercise:   With CGA/SBA of therapist in water c pt  60 mins  1.5# 45 min 1.5# 55 min 1.5 # 55 min 1.5# 60 min 1.5#/40 minutes 1.5#/40 minutes     Pt to use lift chair for in/out of pool             Gait F/B/S/M  With Gait Belt  Forward  CGA 20 mins total c rest breaks [id number]  X2L March  Side step 10ft to L   CGA/Ever c rest breaks X6L Forward 4ft CGA    X2L Forward 3 ft Min A X4 L forward 3.5ft  X2L forward 4.5ft  X1L March 4.5ft    Gait belt and CGA/Ever X2L F/B/S/M with aquatic walker x4 L each with aquatic walker x4 L      4-way hip  4x5 X10 B X10 B X15 B X15 BLE at edge of pool x20 BLE x20 BLE     PF/DF  x20 X15 X10 X15 x15 x20 x25     Hamstring Curls     X10 B        Hip Flexion with knee ext.   (rockette)  LAQ sitting in lift chair LAQ sitting in lift chair X15 BLE  LAQ sitting in lift chair BLE  x10 BLE x15 BLE     Squats     X15 CGA X15 X15  x20 x25     SLR march             Core:               Core:             Deep well bike             Deep well jumping jelena             Deep well scissors             Deep well:  traction             Core: Seated rows    X10 c open paddles X15 c open paddles X15 with hydro bells x15 open paddles x15 closed paddles     Core: Seated horizontal ad/Abd      X15 with hydro bells x15 x15 closed paddles     Core: Seated abd/add    X10 c open paddles X15 c open paddles X15 with hydro bells x15      Seated hip abd/add    Yellow ball squeeze X10H5  Manual resistance abd X10H5  X15H3 manual resistance/ using add ball       Seated leg press    Manual resistance X10 B X15 manual resistance  same       Seated Marches       X15 B x15 B X20 BLE x20 BLE x20 BLE     Standing Marches     X15 B        Seated LAQ    X15 B  X20 BLE       Hamstring Stretch             Piriformis stretch             PF Stretch             Balance: standing without use of rail        3 x 30 seconds with mild perturbations of water 3 x 1 minute with mild perturbations with one round     Balance: standing with UE reaching overhead        2x10 2x5     Step-ups        x10 RLE     Balance with turning body to R/L        x5 reps each     Therapist accompanied pt to car, c CGA during amb gait and SBA for getting in car    done           Therapeutic Exercise: 8 minutes        55 min 45 min   Supine hip abduction x15 Seated 2X10 red theraband x15 BLE 2#   bridging x15        X15 x15 with add ball   Heel/toe raises x5        Seated X15    Sit to stand         X5    Supine hip add         X10 H5    Seated marches         2X10 B x20 BLE 2#   LAQ         2X10 B 2x10 BLE 2#   SAQ             SLR         2X10 B x20 BLE 2#    Seated Hip abduction          x20 with red TB   Seated Hip adduction          x20H5 with add ball   Gait Training          x75 ft with RW   Lateral weight shifts with contralateral LE march         X10 B X15 BLE- progressed to 2 to 3 side steps R/L   Lateral weight shifts         X10 B    Diagonal weight shifts             Diagonal weight shifts with reach             A/P weight shift with reach         X5 B    A/P weight shift         X10 B X15 BLE- progressed to 2 to 3 steps forward    F=forward  B=Backward  S=sidesteps  M=marches    H=hold    Manual: (see flow sheet above for minutes) ---  Modalities: (see flow sheet above for minutes) ---    HEP: discussed current home health PT HEP and will advance as appropriate in the future.      Treatment/Session Assessment:  Discontinue at this time due to fall and pelvic fracture    · Compliance with Program/Exercises: partial   · Recommendations/Intent for next treatment session: Discontinue          Nahomi Gallegos, PT

## 2017-09-01 ENCOUNTER — PATIENT OUTREACH (OUTPATIENT)
Dept: CASE MANAGEMENT | Age: 82
End: 2017-09-01

## 2017-09-01 NOTE — PROGRESS NOTES
Initial SNF F/U outreach attempt to patient was unsuccessful. Second outreach attempt will be made by CC. This note will not be viewable in [address]

## 2017-09-02 ENCOUNTER — PATIENT OUTREACH (OUTPATIENT)
Dept: CASE MANAGEMENT | Age: 82
End: 2017-09-02

## 2018-01-01 ENCOUNTER — APPOINTMENT (OUTPATIENT)
Dept: GENERAL RADIOLOGY | Age: 83
DRG: 086 | End: 2018-01-01
Attending: INTERNAL MEDICINE
Payer: MEDICARE

## 2018-01-01 ENCOUNTER — HOME CARE VISIT (OUTPATIENT)
Dept: SCHEDULING | Facility: HOME HEALTH | Age: 83
End: 2018-01-01
Payer: MEDICARE

## 2018-01-01 ENCOUNTER — HOME CARE VISIT (OUTPATIENT)
Dept: HOME HEALTH SERVICES | Facility: HOME HEALTH | Age: 83
End: 2018-01-01
Payer: MEDICARE

## 2018-01-01 ENCOUNTER — HOME HEALTH ADMISSION (OUTPATIENT)
Dept: HOME HEALTH SERVICES | Facility: HOME HEALTH | Age: 83
End: 2018-01-01

## 2018-01-01 ENCOUNTER — PATIENT OUTREACH (OUTPATIENT)
Dept: CASE MANAGEMENT | Age: 83
End: 2018-01-01

## 2018-01-01 ENCOUNTER — APPOINTMENT (OUTPATIENT)
Dept: CT IMAGING | Age: 83
DRG: 086 | End: 2018-01-01
Attending: EMERGENCY MEDICINE
Payer: MEDICARE

## 2018-01-01 ENCOUNTER — HOSPITAL ENCOUNTER (INPATIENT)
Age: 83
LOS: 3 days | Discharge: NURSING, REHAB OR LONG-TERM CARE | DRG: 086 | End: 2018-09-14
Attending: EMERGENCY MEDICINE | Admitting: INTERNAL MEDICINE
Payer: MEDICARE

## 2018-01-01 ENCOUNTER — APPOINTMENT (OUTPATIENT)
Dept: CT IMAGING | Age: 83
DRG: 086 | End: 2018-01-01
Attending: INTERNAL MEDICINE
Payer: MEDICARE

## 2018-01-01 ENCOUNTER — HOME HEALTH ADMISSION (OUTPATIENT)
Dept: HOME HEALTH SERVICES | Facility: HOME HEALTH | Age: 83
End: 2018-01-01
Payer: MEDICARE

## 2018-01-01 ENCOUNTER — APPOINTMENT (OUTPATIENT)
Dept: GENERAL RADIOLOGY | Age: 83
DRG: 086 | End: 2018-01-01
Attending: EMERGENCY MEDICINE
Payer: MEDICARE

## 2018-01-01 VITALS
RESPIRATION RATE: 16 BRPM | DIASTOLIC BLOOD PRESSURE: 58 MMHG | HEART RATE: 62 BPM | SYSTOLIC BLOOD PRESSURE: 122 MMHG | TEMPERATURE: 97.6 F | OXYGEN SATURATION: 97 %

## 2018-01-01 VITALS
DIASTOLIC BLOOD PRESSURE: 64 MMHG | HEART RATE: 72 BPM | SYSTOLIC BLOOD PRESSURE: 108 MMHG | TEMPERATURE: 97.9 F | RESPIRATION RATE: 16 BRPM | OXYGEN SATURATION: 96 %

## 2018-01-01 VITALS
DIASTOLIC BLOOD PRESSURE: 60 MMHG | SYSTOLIC BLOOD PRESSURE: 98 MMHG | HEART RATE: 60 BPM | OXYGEN SATURATION: 93 % | TEMPERATURE: 97.4 F | RESPIRATION RATE: 16 BRPM

## 2018-01-01 VITALS
DIASTOLIC BLOOD PRESSURE: 62 MMHG | HEART RATE: 81 BPM | RESPIRATION RATE: 16 BRPM | SYSTOLIC BLOOD PRESSURE: 112 MMHG | TEMPERATURE: 97.3 F | OXYGEN SATURATION: 97 %

## 2018-01-01 VITALS
OXYGEN SATURATION: 96 % | TEMPERATURE: 97.9 F | DIASTOLIC BLOOD PRESSURE: 58 MMHG | HEART RATE: 62 BPM | RESPIRATION RATE: 17 BRPM | SYSTOLIC BLOOD PRESSURE: 110 MMHG

## 2018-01-01 VITALS
DIASTOLIC BLOOD PRESSURE: 68 MMHG | OXYGEN SATURATION: 93 % | SYSTOLIC BLOOD PRESSURE: 98 MMHG | RESPIRATION RATE: 18 BRPM | TEMPERATURE: 98.2 F | HEART RATE: 68 BPM

## 2018-01-01 VITALS
TEMPERATURE: 97.9 F | HEART RATE: 60 BPM | RESPIRATION RATE: 16 BRPM | DIASTOLIC BLOOD PRESSURE: 70 MMHG | SYSTOLIC BLOOD PRESSURE: 110 MMHG

## 2018-01-01 VITALS
HEART RATE: 66 BPM | TEMPERATURE: 97.5 F | RESPIRATION RATE: 16 BRPM | OXYGEN SATURATION: 97 % | SYSTOLIC BLOOD PRESSURE: 100 MMHG | DIASTOLIC BLOOD PRESSURE: 60 MMHG

## 2018-01-01 VITALS
TEMPERATURE: 97.8 F | OXYGEN SATURATION: 96 % | RESPIRATION RATE: 18 BRPM | SYSTOLIC BLOOD PRESSURE: 116 MMHG | DIASTOLIC BLOOD PRESSURE: 68 MMHG | HEART RATE: 72 BPM

## 2018-01-01 VITALS
TEMPERATURE: 97.3 F | RESPIRATION RATE: 17 BRPM | HEART RATE: 62 BPM | OXYGEN SATURATION: 98 % | DIASTOLIC BLOOD PRESSURE: 66 MMHG | SYSTOLIC BLOOD PRESSURE: 108 MMHG

## 2018-01-01 VITALS
OXYGEN SATURATION: 96 % | DIASTOLIC BLOOD PRESSURE: 58 MMHG | TEMPERATURE: 97.9 F | HEART RATE: 58 BPM | SYSTOLIC BLOOD PRESSURE: 102 MMHG | RESPIRATION RATE: 16 BRPM

## 2018-01-01 VITALS
SYSTOLIC BLOOD PRESSURE: 90 MMHG | TEMPERATURE: 96.5 F | DIASTOLIC BLOOD PRESSURE: 56 MMHG | HEART RATE: 63 BPM | RESPIRATION RATE: 17 BRPM | OXYGEN SATURATION: 96 %

## 2018-01-01 VITALS
HEART RATE: 62 BPM | TEMPERATURE: 96.2 F | DIASTOLIC BLOOD PRESSURE: 64 MMHG | RESPIRATION RATE: 17 BRPM | SYSTOLIC BLOOD PRESSURE: 108 MMHG

## 2018-01-01 VITALS
RESPIRATION RATE: 16 BRPM | OXYGEN SATURATION: 97 % | TEMPERATURE: 97.8 F | SYSTOLIC BLOOD PRESSURE: 108 MMHG | HEART RATE: 88 BPM | DIASTOLIC BLOOD PRESSURE: 58 MMHG

## 2018-01-01 VITALS
DIASTOLIC BLOOD PRESSURE: 60 MMHG | HEART RATE: 84 BPM | OXYGEN SATURATION: 97 % | TEMPERATURE: 98.3 F | SYSTOLIC BLOOD PRESSURE: 104 MMHG | RESPIRATION RATE: 16 BRPM

## 2018-01-01 VITALS
SYSTOLIC BLOOD PRESSURE: 102 MMHG | DIASTOLIC BLOOD PRESSURE: 68 MMHG | OXYGEN SATURATION: 95 % | TEMPERATURE: 97.7 F | HEART RATE: 88 BPM | RESPIRATION RATE: 16 BRPM

## 2018-01-01 VITALS
RESPIRATION RATE: 18 BRPM | TEMPERATURE: 97.5 F | DIASTOLIC BLOOD PRESSURE: 60 MMHG | SYSTOLIC BLOOD PRESSURE: 110 MMHG | HEART RATE: 66 BPM

## 2018-01-01 VITALS
RESPIRATION RATE: 17 BRPM | TEMPERATURE: 97.3 F | DIASTOLIC BLOOD PRESSURE: 70 MMHG | SYSTOLIC BLOOD PRESSURE: 108 MMHG | HEART RATE: 68 BPM

## 2018-01-01 VITALS
RESPIRATION RATE: 16 BRPM | TEMPERATURE: 97.8 F | HEART RATE: 62 BPM | SYSTOLIC BLOOD PRESSURE: 110 MMHG | DIASTOLIC BLOOD PRESSURE: 62 MMHG

## 2018-01-01 VITALS
TEMPERATURE: 97.3 F | OXYGEN SATURATION: 94 % | DIASTOLIC BLOOD PRESSURE: 52 MMHG | HEART RATE: 65 BPM | WEIGHT: 103.4 LBS | SYSTOLIC BLOOD PRESSURE: 109 MMHG | BODY MASS INDEX: 20.19 KG/M2 | RESPIRATION RATE: 20 BRPM

## 2018-01-01 VITALS
HEART RATE: 62 BPM | SYSTOLIC BLOOD PRESSURE: 98 MMHG | TEMPERATURE: 96.6 F | DIASTOLIC BLOOD PRESSURE: 60 MMHG | RESPIRATION RATE: 18 BRPM

## 2018-01-01 VITALS
TEMPERATURE: 97.4 F | HEART RATE: 60 BPM | OXYGEN SATURATION: 97 % | SYSTOLIC BLOOD PRESSURE: 122 MMHG | DIASTOLIC BLOOD PRESSURE: 64 MMHG | RESPIRATION RATE: 18 BRPM

## 2018-01-01 VITALS
HEART RATE: 64 BPM | RESPIRATION RATE: 16 BRPM | DIASTOLIC BLOOD PRESSURE: 68 MMHG | TEMPERATURE: 97.8 F | SYSTOLIC BLOOD PRESSURE: 124 MMHG

## 2018-01-01 DIAGNOSIS — F41.9 ANXIETY: ICD-10-CM

## 2018-01-01 DIAGNOSIS — S06.5XAA SUBDURAL HEMATOMA: Primary | ICD-10-CM

## 2018-01-01 LAB
ALBUMIN SERPL-MCNC: 2.8 G/DL (ref 3.2–4.6)
ALBUMIN/GLOB SERPL: 0.7 {RATIO} (ref 1.2–3.5)
ALP SERPL-CCNC: 115 U/L (ref 50–136)
ALT SERPL-CCNC: 12 U/L (ref 12–65)
ANION GAP SERPL CALC-SCNC: 10 MMOL/L (ref 7–16)
ANION GAP SERPL CALC-SCNC: 8 MMOL/L (ref 7–16)
APPEARANCE UR: ABNORMAL
APTT PPP: 24 SEC (ref 23.2–35.3)
AST SERPL-CCNC: 12 U/L (ref 15–37)
ATRIAL RATE: 60 BPM
BACTERIA SPEC CULT: ABNORMAL
BACTERIA URNS QL MICRO: ABNORMAL /HPF
BASOPHILS # BLD: 0 K/UL (ref 0–0.2)
BASOPHILS NFR BLD: 0 % (ref 0–2)
BILIRUB SERPL-MCNC: 0.3 MG/DL (ref 0.2–1.1)
BILIRUB UR QL: NEGATIVE
BUN SERPL-MCNC: 16 MG/DL (ref 8–23)
BUN SERPL-MCNC: 19 MG/DL (ref 8–23)
CALCIUM SERPL-MCNC: 9.5 MG/DL (ref 8.3–10.4)
CALCIUM SERPL-MCNC: 9.9 MG/DL (ref 8.3–10.4)
CALCULATED P AXIS, ECG09: 71 DEGREES
CALCULATED R AXIS, ECG10: -29 DEGREES
CALCULATED T AXIS, ECG11: 107 DEGREES
CHLORIDE SERPL-SCNC: 109 MMOL/L (ref 98–107)
CHLORIDE SERPL-SCNC: 109 MMOL/L (ref 98–107)
CK SERPL-CCNC: 65 U/L (ref 21–215)
CO2 SERPL-SCNC: 25 MMOL/L (ref 21–32)
CO2 SERPL-SCNC: 26 MMOL/L (ref 21–32)
COLOR UR: YELLOW
CREAT SERPL-MCNC: 1.06 MG/DL (ref 0.6–1)
CREAT SERPL-MCNC: 1.16 MG/DL (ref 0.6–1)
DIAGNOSIS, 93000: NORMAL
DIFFERENTIAL METHOD BLD: ABNORMAL
EOSINOPHIL # BLD: 0.1 K/UL (ref 0–0.8)
EOSINOPHIL NFR BLD: 1 % (ref 0.5–7.8)
ERYTHROCYTE [DISTWIDTH] IN BLOOD BY AUTOMATED COUNT: 13.4 %
ERYTHROCYTE [DISTWIDTH] IN BLOOD BY AUTOMATED COUNT: 13.6 %
GLOBULIN SER CALC-MCNC: 4.1 G/DL (ref 2.3–3.5)
GLUCOSE SERPL-MCNC: 83 MG/DL (ref 65–100)
GLUCOSE SERPL-MCNC: 83 MG/DL (ref 65–100)
GLUCOSE UR STRIP.AUTO-MCNC: NEGATIVE MG/DL
HCT VFR BLD AUTO: 39.1 % (ref 35.8–46.3)
HCT VFR BLD AUTO: 40.5 % (ref 35.8–46.3)
HGB BLD-MCNC: 12.3 G/DL (ref 11.7–15.4)
HGB BLD-MCNC: 12.5 G/DL (ref 11.7–15.4)
HGB UR QL STRIP: NEGATIVE
IMM GRANULOCYTES # BLD: 0 K/UL (ref 0–0.5)
IMM GRANULOCYTES NFR BLD AUTO: 0 % (ref 0–5)
INR PPP: 1
KETONES UR QL STRIP.AUTO: NEGATIVE MG/DL
LEUKOCYTE ESTERASE UR QL STRIP.AUTO: ABNORMAL
LYMPHOCYTES # BLD: 1.7 K/UL (ref 0.5–4.6)
LYMPHOCYTES NFR BLD: 20 % (ref 13–44)
MAGNESIUM SERPL-MCNC: 2.5 MG/DL (ref 1.8–2.4)
MAGNESIUM SERPL-MCNC: 2.5 MG/DL (ref 1.8–2.4)
MCH RBC QN AUTO: 29.7 PG (ref 26.1–32.9)
MCH RBC QN AUTO: 29.9 PG (ref 26.1–32.9)
MCHC RBC AUTO-ENTMCNC: 30.9 G/DL (ref 31.4–35)
MCHC RBC AUTO-ENTMCNC: 31.5 G/DL (ref 31.4–35)
MCV RBC AUTO: 95.1 FL (ref 79.6–97.8)
MCV RBC AUTO: 96.2 FL (ref 79.6–97.8)
MM INDURATION POC: NORMAL 0MM (ref 0–5)
MM INDURATION POC: NORMAL 0MM (ref 0–5)
MONOCYTES # BLD: 0.8 K/UL (ref 0.1–1.3)
MONOCYTES NFR BLD: 9 % (ref 4–12)
NEUTS SEG # BLD: 5.6 K/UL (ref 1.7–8.2)
NEUTS SEG NFR BLD: 69 % (ref 43–78)
NITRITE UR QL STRIP.AUTO: NEGATIVE
NRBC # BLD: 0 K/UL (ref 0–0.2)
NRBC # BLD: 0 K/UL (ref 0–0.2)
P-R INTERVAL, ECG05: 192 MS
PH UR STRIP: 7 [PH] (ref 5–9)
PLATELET # BLD AUTO: 191 K/UL (ref 150–450)
PLATELET # BLD AUTO: 213 K/UL (ref 150–450)
PMV BLD AUTO: 9.3 FL (ref 9.4–12.3)
PMV BLD AUTO: 9.5 FL (ref 9.4–12.3)
POTASSIUM SERPL-SCNC: 4.4 MMOL/L (ref 3.5–5.1)
POTASSIUM SERPL-SCNC: 5.2 MMOL/L (ref 3.5–5.1)
PPD POC: NORMAL NEGATIVE
PPD POC: NORMAL NEGATIVE
PROT SERPL-MCNC: 6.9 G/DL (ref 6.3–8.2)
PROT UR STRIP-MCNC: NEGATIVE MG/DL
PROTHROMBIN TIME: 13.1 SEC (ref 11.5–14.5)
Q-T INTERVAL, ECG07: 416 MS
QRS DURATION, ECG06: 86 MS
QTC CALCULATION (BEZET), ECG08: 416 MS
RBC # BLD AUTO: 4.11 M/UL (ref 4.05–5.2)
RBC # BLD AUTO: 4.21 M/UL (ref 4.05–5.2)
SERVICE CMNT-IMP: ABNORMAL
SODIUM SERPL-SCNC: 143 MMOL/L (ref 136–145)
SODIUM SERPL-SCNC: 144 MMOL/L (ref 136–145)
SP GR UR REFRACTOMETRY: 1.02 (ref 1–1.02)
UROBILINOGEN UR QL STRIP.AUTO: 1 EU/DL (ref 0.2–1)
VENTRICULAR RATE, ECG03: 60 BPM
WBC # BLD AUTO: 8.1 K/UL (ref 4.3–11.1)
WBC # BLD AUTO: 8.2 K/UL (ref 4.3–11.1)
WBC URNS QL MICRO: ABNORMAL /HPF

## 2018-01-01 PROCEDURE — 65660000000 HC RM CCU STEPDOWN

## 2018-01-01 PROCEDURE — 3331090001 HH PPS REVENUE CREDIT

## 2018-01-01 PROCEDURE — 3331090002 HH PPS REVENUE DEBIT

## 2018-01-01 PROCEDURE — 71046 X-RAY EXAM CHEST 2 VIEWS: CPT

## 2018-01-01 PROCEDURE — G0157 HHC PT ASSISTANT EA 15: HCPCS

## 2018-01-01 PROCEDURE — 77030020263 HC SOL INJ SOD CL0.9% LFCR 1000ML

## 2018-01-01 PROCEDURE — G0152 HHCP-SERV OF OT,EA 15 MIN: HCPCS

## 2018-01-01 PROCEDURE — 74011250637 HC RX REV CODE- 250/637: Performed by: INTERNAL MEDICINE

## 2018-01-01 PROCEDURE — 80048 BASIC METABOLIC PNL TOTAL CA: CPT

## 2018-01-01 PROCEDURE — G0153 HHCP-SVS OF S/L PATH,EA 15MN: HCPCS

## 2018-01-01 PROCEDURE — G0158 HHC OT ASSISTANT EA 15: HCPCS

## 2018-01-01 PROCEDURE — 74011250636 HC RX REV CODE- 250/636: Performed by: INTERNAL MEDICINE

## 2018-01-01 PROCEDURE — A6222 GAUZE <=16 IN NO W/SAL W/O B: HCPCS

## 2018-01-01 PROCEDURE — 81001 URINALYSIS AUTO W/SCOPE: CPT

## 2018-01-01 PROCEDURE — 97535 SELF CARE MNGMENT TRAINING: CPT

## 2018-01-01 PROCEDURE — 74011000302 HC RX REV CODE- 302: Performed by: INTERNAL MEDICINE

## 2018-01-01 PROCEDURE — 83735 ASSAY OF MAGNESIUM: CPT

## 2018-01-01 PROCEDURE — 74011000250 HC RX REV CODE- 250: Performed by: INTERNAL MEDICINE

## 2018-01-01 PROCEDURE — 97530 THERAPEUTIC ACTIVITIES: CPT

## 2018-01-01 PROCEDURE — 99284 EMERGENCY DEPT VISIT MOD MDM: CPT | Performed by: EMERGENCY MEDICINE

## 2018-01-01 PROCEDURE — 97162 PT EVAL MOD COMPLEX 30 MIN: CPT

## 2018-01-01 PROCEDURE — 86580 TB INTRADERMAL TEST: CPT | Performed by: INTERNAL MEDICINE

## 2018-01-01 PROCEDURE — 85025 COMPLETE CBC W/AUTO DIFF WBC: CPT

## 2018-01-01 PROCEDURE — 87186 SC STD MICRODIL/AGAR DIL: CPT

## 2018-01-01 PROCEDURE — A6212 FOAM DRG <=16 SQ IN W/BORDER: HCPCS

## 2018-01-01 PROCEDURE — G0299 HHS/HOSPICE OF RN EA 15 MIN: HCPCS

## 2018-01-01 PROCEDURE — G0151 HHCP-SERV OF PT,EA 15 MIN: HCPCS

## 2018-01-01 PROCEDURE — 77030032490 HC SLV COMPR SCD KNE COVD -B

## 2018-01-01 PROCEDURE — 87088 URINE BACTERIA CULTURE: CPT

## 2018-01-01 PROCEDURE — 36415 COLL VENOUS BLD VENIPUNCTURE: CPT

## 2018-01-01 PROCEDURE — 97166 OT EVAL MOD COMPLEX 45 MIN: CPT

## 2018-01-01 PROCEDURE — 80053 COMPREHEN METABOLIC PANEL: CPT

## 2018-01-01 PROCEDURE — 70450 CT HEAD/BRAIN W/O DYE: CPT

## 2018-01-01 PROCEDURE — 93005 ELECTROCARDIOGRAM TRACING: CPT | Performed by: EMERGENCY MEDICINE

## 2018-01-01 PROCEDURE — 65610000001 HC ROOM ICU GENERAL

## 2018-01-01 PROCEDURE — A6260 WOUND CLEANSER ANY TYPE/SIZE: HCPCS

## 2018-01-01 PROCEDURE — 400013 HH SOC

## 2018-01-01 PROCEDURE — 87086 URINE CULTURE/COLONY COUNT: CPT

## 2018-01-01 PROCEDURE — 85730 THROMBOPLASTIN TIME PARTIAL: CPT

## 2018-01-01 PROCEDURE — 71045 X-RAY EXAM CHEST 1 VIEW: CPT

## 2018-01-01 PROCEDURE — 97110 THERAPEUTIC EXERCISES: CPT

## 2018-01-01 PROCEDURE — 3331090003 HH PPS REVENUE ADJ

## 2018-01-01 PROCEDURE — 82550 ASSAY OF CK (CPK): CPT

## 2018-01-01 PROCEDURE — 85610 PROTHROMBIN TIME: CPT

## 2018-01-01 PROCEDURE — 77030011943

## 2018-01-01 PROCEDURE — 85027 COMPLETE CBC AUTOMATED: CPT

## 2018-01-01 PROCEDURE — 70486 CT MAXILLOFACIAL W/O DYE: CPT

## 2018-01-01 RX ORDER — ENALAPRILAT 1.25 MG/ML
0.62 INJECTION INTRAVENOUS
Status: DISCONTINUED | OUTPATIENT
Start: 2018-01-01 | End: 2018-01-01

## 2018-01-01 RX ORDER — ACETAMINOPHEN 500 MG
500 TABLET ORAL
Status: DISCONTINUED | OUTPATIENT
Start: 2018-01-01 | End: 2018-01-01 | Stop reason: HOSPADM

## 2018-01-01 RX ORDER — AMLODIPINE BESYLATE 5 MG/1
5 TABLET ORAL DAILY
Status: DISCONTINUED | OUTPATIENT
Start: 2018-01-01 | End: 2018-01-01

## 2018-01-01 RX ORDER — ENALAPRILAT 1.25 MG/ML
1.25 INJECTION INTRAVENOUS
Status: DISCONTINUED | OUTPATIENT
Start: 2018-01-01 | End: 2018-01-01 | Stop reason: HOSPADM

## 2018-01-01 RX ORDER — AMLODIPINE BESYLATE 5 MG/1
5 TABLET ORAL DAILY
Status: DISCONTINUED | OUTPATIENT
Start: 2018-01-01 | End: 2018-01-01 | Stop reason: HOSPADM

## 2018-01-01 RX ORDER — LEVETIRACETAM 500 MG/1
500 TABLET ORAL EVERY 12 HOURS
Status: DISCONTINUED | OUTPATIENT
Start: 2018-01-01 | End: 2018-01-01 | Stop reason: HOSPADM

## 2018-01-01 RX ORDER — LORAZEPAM 0.5 MG/1
0.5 TABLET ORAL
Qty: 10 TAB | Refills: 0 | Status: SHIPPED | OUTPATIENT
Start: 2018-01-01 | End: 2019-01-01 | Stop reason: SDUPTHER

## 2018-01-01 RX ORDER — DOCUSATE SODIUM 100 MG/1
100 CAPSULE, LIQUID FILLED ORAL 2 TIMES DAILY
Status: DISCONTINUED | OUTPATIENT
Start: 2018-01-01 | End: 2018-01-01 | Stop reason: HOSPADM

## 2018-01-01 RX ORDER — ONDANSETRON 2 MG/ML
4 INJECTION INTRAMUSCULAR; INTRAVENOUS
Status: DISCONTINUED | OUTPATIENT
Start: 2018-01-01 | End: 2018-01-01 | Stop reason: HOSPADM

## 2018-01-01 RX ORDER — ATORVASTATIN CALCIUM 10 MG/1
10 TABLET, FILM COATED ORAL
Status: DISCONTINUED | OUTPATIENT
Start: 2018-01-01 | End: 2018-01-01 | Stop reason: HOSPADM

## 2018-01-01 RX ORDER — FAMOTIDINE 20 MG/1
20 TABLET, FILM COATED ORAL DAILY
Status: DISCONTINUED | OUTPATIENT
Start: 2018-01-01 | End: 2018-01-01 | Stop reason: HOSPADM

## 2018-01-01 RX ORDER — CEFPODOXIME PROXETIL 100 MG/1
100 TABLET, FILM COATED ORAL DAILY
Qty: 2 TAB | Refills: 0 | Status: SHIPPED | OUTPATIENT
Start: 2018-01-01 | End: 2018-01-01

## 2018-01-01 RX ORDER — NITROGLYCERIN 0.4 MG/1
0.4 TABLET SUBLINGUAL
Status: DISCONTINUED | OUTPATIENT
Start: 2018-01-01 | End: 2018-01-01 | Stop reason: HOSPADM

## 2018-01-01 RX ORDER — AMLODIPINE BESYLATE 5 MG/1
5 TABLET ORAL DAILY
Qty: 20 TAB | Refills: 0 | Status: SHIPPED | OUTPATIENT
Start: 2018-01-01 | End: 2019-01-01

## 2018-01-01 RX ORDER — AMIODARONE HYDROCHLORIDE 100 MG/1
100 TABLET ORAL DAILY
Qty: 30 TAB | Refills: 0 | Status: SHIPPED | OUTPATIENT
Start: 2018-01-01 | End: 2018-01-01 | Stop reason: SDUPTHER

## 2018-01-01 RX ORDER — CARVEDILOL 3.12 MG/1
3.12 TABLET ORAL 2 TIMES DAILY WITH MEALS
Status: DISCONTINUED | OUTPATIENT
Start: 2018-01-01 | End: 2018-01-01 | Stop reason: HOSPADM

## 2018-01-01 RX ORDER — ALBUTEROL SULFATE 0.83 MG/ML
2.5 SOLUTION RESPIRATORY (INHALATION)
Status: DISCONTINUED | OUTPATIENT
Start: 2018-01-01 | End: 2018-01-01 | Stop reason: HOSPADM

## 2018-01-01 RX ORDER — SODIUM CHLORIDE 9 MG/ML
50 INJECTION, SOLUTION INTRAVENOUS CONTINUOUS
Status: DISPENSED | OUTPATIENT
Start: 2018-01-01 | End: 2018-01-01

## 2018-01-01 RX ORDER — ENALAPRILAT 1.25 MG/ML
0.62 INJECTION INTRAVENOUS
Status: COMPLETED | OUTPATIENT
Start: 2018-01-01 | End: 2018-01-01

## 2018-01-01 RX ORDER — CEFPODOXIME PROXETIL 200 MG/1
100 TABLET, FILM COATED ORAL DAILY
Status: DISCONTINUED | OUTPATIENT
Start: 2018-01-01 | End: 2018-01-01 | Stop reason: HOSPADM

## 2018-01-01 RX ORDER — TRAMADOL HYDROCHLORIDE 50 MG/1
50 TABLET ORAL
Status: DISCONTINUED | OUTPATIENT
Start: 2018-01-01 | End: 2018-01-01 | Stop reason: HOSPADM

## 2018-01-01 RX ORDER — LEVETIRACETAM 500 MG/1
500 TABLET ORAL EVERY 12 HOURS
Qty: 8 TAB | Refills: 0 | Status: SHIPPED | OUTPATIENT
Start: 2018-01-01 | End: 2018-01-01

## 2018-01-01 RX ORDER — LORAZEPAM 0.5 MG/1
0.5 TABLET ORAL
Status: DISCONTINUED | OUTPATIENT
Start: 2018-01-01 | End: 2018-01-01 | Stop reason: HOSPADM

## 2018-01-01 RX ORDER — MORPHINE SULFATE 2 MG/ML
1 INJECTION, SOLUTION INTRAMUSCULAR; INTRAVENOUS
Status: DISCONTINUED | OUTPATIENT
Start: 2018-01-01 | End: 2018-01-01 | Stop reason: HOSPADM

## 2018-01-01 RX ORDER — AMIODARONE HYDROCHLORIDE 200 MG/1
100 TABLET ORAL DAILY
Status: DISCONTINUED | OUTPATIENT
Start: 2018-01-01 | End: 2018-01-01 | Stop reason: HOSPADM

## 2018-01-01 RX ADMIN — FAMOTIDINE 20 MG: 20 TABLET, FILM COATED ORAL at 09:45

## 2018-01-01 RX ADMIN — ENALAPRILAT 0.62 MG: 1.25 INJECTION INTRAVENOUS at 21:31

## 2018-01-01 RX ADMIN — FAMOTIDINE 20 MG: 20 TABLET, FILM COATED ORAL at 11:42

## 2018-01-01 RX ADMIN — MORPHINE SULFATE 1 MG: 2 INJECTION, SOLUTION INTRAMUSCULAR; INTRAVENOUS at 23:18

## 2018-01-01 RX ADMIN — LEVETIRACETAM 500 MG: 500 TABLET, FILM COATED ORAL at 13:21

## 2018-01-01 RX ADMIN — LORAZEPAM 0.5 MG: 0.5 TABLET ORAL at 22:14

## 2018-01-01 RX ADMIN — ATORVASTATIN CALCIUM 10 MG: 10 TABLET, FILM COATED ORAL at 22:10

## 2018-01-01 RX ADMIN — CARVEDILOL 3.12 MG: 3.12 TABLET, FILM COATED ORAL at 17:24

## 2018-01-01 RX ADMIN — Medication 1 AMPULE: at 21:22

## 2018-01-01 RX ADMIN — Medication 1 AMPULE: at 08:35

## 2018-01-01 RX ADMIN — CEFPODOXIME PROXETIL 100 MG: 200 TABLET, FILM COATED ORAL at 03:03

## 2018-01-01 RX ADMIN — DOCUSATE SODIUM 100 MG: 100 CAPSULE, LIQUID FILLED ORAL at 18:38

## 2018-01-01 RX ADMIN — LEVETIRACETAM 500 MG: 500 TABLET, FILM COATED ORAL at 22:10

## 2018-01-01 RX ADMIN — Medication 1 AMPULE: at 11:41

## 2018-01-01 RX ADMIN — FAMOTIDINE 20 MG: 10 INJECTION INTRAVENOUS at 08:33

## 2018-01-01 RX ADMIN — SODIUM CHLORIDE 50 ML/HR: 900 INJECTION, SOLUTION INTRAVENOUS at 20:58

## 2018-01-01 RX ADMIN — ENALAPRILAT 0.62 MG: 1.25 INJECTION INTRAVENOUS at 23:28

## 2018-01-01 RX ADMIN — DOCUSATE SODIUM 100 MG: 100 CAPSULE, LIQUID FILLED ORAL at 08:33

## 2018-01-01 RX ADMIN — LEVETIRACETAM 500 MG: 500 TABLET, FILM COATED ORAL at 21:18

## 2018-01-01 RX ADMIN — AMIODARONE HYDROCHLORIDE 100 MG: 200 TABLET ORAL at 11:42

## 2018-01-01 RX ADMIN — AMLODIPINE BESYLATE 5 MG: 5 TABLET ORAL at 00:03

## 2018-01-01 RX ADMIN — CEFPODOXIME PROXETIL 100 MG: 200 TABLET, FILM COATED ORAL at 09:47

## 2018-01-01 RX ADMIN — CARVEDILOL 3.12 MG: 3.12 TABLET, FILM COATED ORAL at 11:43

## 2018-01-01 RX ADMIN — ATORVASTATIN CALCIUM 10 MG: 10 TABLET, FILM COATED ORAL at 21:18

## 2018-01-01 RX ADMIN — DOCUSATE SODIUM 100 MG: 100 CAPSULE, LIQUID FILLED ORAL at 17:24

## 2018-01-01 RX ADMIN — CARVEDILOL 3.12 MG: 3.12 TABLET, FILM COATED ORAL at 18:38

## 2018-01-01 RX ADMIN — LORAZEPAM 0.5 MG: 0.5 TABLET ORAL at 23:18

## 2018-01-01 RX ADMIN — Medication 1 AMPULE: at 09:48

## 2018-01-01 RX ADMIN — Medication 1 AMPULE: at 21:31

## 2018-01-01 RX ADMIN — ATORVASTATIN CALCIUM 10 MG: 10 TABLET, FILM COATED ORAL at 21:31

## 2018-01-01 RX ADMIN — AMLODIPINE BESYLATE 5 MG: 5 TABLET ORAL at 09:48

## 2018-01-01 RX ADMIN — AMIODARONE HYDROCHLORIDE 100 MG: 200 TABLET ORAL at 08:33

## 2018-01-01 RX ADMIN — AMLODIPINE BESYLATE 5 MG: 5 TABLET ORAL at 11:43

## 2018-01-01 RX ADMIN — CARVEDILOL 3.12 MG: 3.12 TABLET, FILM COATED ORAL at 08:33

## 2018-01-01 RX ADMIN — TUBERCULIN PURIFIED PROTEIN DERIVATIVE 5 UNITS: 5 INJECTION, SOLUTION INTRADERMAL at 21:30

## 2018-01-01 RX ADMIN — DOCUSATE SODIUM 100 MG: 100 CAPSULE, LIQUID FILLED ORAL at 09:45

## 2018-01-01 RX ADMIN — AMIODARONE HYDROCHLORIDE 100 MG: 200 TABLET ORAL at 09:45

## 2018-01-01 RX ADMIN — CEFPODOXIME PROXETIL 100 MG: 200 TABLET, FILM COATED ORAL at 11:41

## 2018-01-01 RX ADMIN — LEVETIRACETAM 500 MG: 500 TABLET, FILM COATED ORAL at 11:42

## 2018-01-01 RX ADMIN — DOCUSATE SODIUM 100 MG: 100 CAPSULE, LIQUID FILLED ORAL at 11:43

## 2018-01-01 RX ADMIN — Medication 1 AMPULE: at 22:09

## 2018-01-01 RX ADMIN — DOCUSATE SODIUM 100 MG: 100 CAPSULE, LIQUID FILLED ORAL at 21:31

## 2018-01-01 RX ADMIN — LEVETIRACETAM 500 MG: 500 TABLET, FILM COATED ORAL at 09:45

## 2018-01-01 RX ADMIN — CARVEDILOL 3.12 MG: 3.12 TABLET, FILM COATED ORAL at 09:45

## 2018-02-23 PROBLEM — I25.10 CORONARY ARTERY DISEASE INVOLVING NATIVE CORONARY ARTERY OF NATIVE HEART WITHOUT ANGINA PECTORIS: Chronic | Status: ACTIVE | Noted: 2018-02-23

## 2018-04-27 PROBLEM — S06.300A INTRACRANIAL HEMORRHAGE AFTER INJURY WITHOUT LOSS OF CONSCIOUSNESS (HCC): Status: RESOLVED | Noted: 2017-08-06 | Resolved: 2018-04-27

## 2018-04-27 PROBLEM — S72.141A CLOSED INTERTROCHANTERIC FRACTURE OF RIGHT FEMUR (HCC): Status: RESOLVED | Noted: 2017-02-18 | Resolved: 2018-04-27

## 2018-09-11 PROBLEM — S06.5XAA SUBDURAL HEMATOMA: Status: ACTIVE | Noted: 2018-01-01
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
## 2018-09-11 NOTE — ED TRIAGE NOTES
Patient presents to ed via ems post fall that happened approximately 10 hours ago. Patient denies syncope, tripping, falling. Patient is on plavix and has pacemaker in place. Patient has swelling to left eye and nose. Patient denies any pain, nausea, or vomiting. Patient denies blurred vision.

## 2018-09-11 NOTE — PROGRESS NOTES
NEUROSURGERY PROGRESS NOTE:  
 
Radha Adorno 80 y.o. female who suffered a fall from standing height and struck her forehead and face. There is a small area in the left frontal pole subdural space concerning for a small subdural hematoma. This may also be a bringing vein. Recommend holding anti-platelet and anti-coagulation medications. Please obtain a repeat CT Head WO contrast in the am. No acute neurosurgical intervention necessary. Recommend q4H neuro checks.   
 
Guillermina Perez MD

## 2018-09-12 NOTE — PROGRESS NOTES
CM received call from pt's daughter, Gaylin Halsted. She requested [facility] [place] since [facility] denied pt. CM sent referral to to [facility] [place] PT/OT/RN.

## 2018-09-12 NOTE — INTERDISCIPLINARY ROUNDS
Interdisciplinary team rounds were held 9/12/2018 with the following team members:Care Management, Nursing, Nurse Practitioner, Nutrition, Palliative Care, Pastoral Care, Pharmacy, Physical Therapy, Physician, Respiratory Therapy and Clinical Coordinator and the patient. Plan of care discussed. See clinical pathway and/or care plan for interventions and desired outcomes.
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
## 2018-09-12 NOTE — PROGRESS NOTES
Problem: Self Care Deficits Care Plan (Adult) Goal: *Acute Goals and Plan of Care (Insert Text) 1. Pt will toilet with SBA 2. Pt will complete functional mobility for ADLs with SBA 3. Pt will complete lower body dressing with SBA using AE as needed 4. Pt will complete grooming and hygiene at sink with SBA 5. Pt will demonstrate improved cognition to complete functional tasks with min or fewer cues Timeframe: 7 days OCCUPATIONAL THERAPY: Initial Assessment, Treatment Day: Day of Assessment and AM 9/12/2018 INPATIENT: Hospital Day: 2 [place] / Product Type: Medicare /  
  
NAME/AGE/GENDER: Lakeisha Blanca is a 80 y.o. female PRIMARY DIAGNOSIS:  Subdural hematoma (HCC) Subdural hematoma (HCC) Subdural hematoma (HCC) ICD-10: Treatment Diagnosis:  
 · History of falling (Z91.81) Precautions/Allergies: 
  falls Celebrex [celecoxib] and Other medication ASSESSMENT:  
 
Ms. Aden Rice was admitted with SDH d/t falling at home. Pt lives with her daughter who provides 24 hr assistance and reports that she was independent with dressing, toileting, and grooming and required assistance with bathing. Pt uses a RW for mobility, stated that she has a walk in shower with grab bars and a shower chair. Pt reports that her grandson is the one who assists her with bathing. This session, pt presented with deficits in cognition, mobility, and balance impacting ADLs. Pt was A&O to self only and was slightly confused with decreased memory. Pt demonstrated decreased safety awareness and mild impulsivity and required min-mod cues to maintain safety. Pt completed bed mobility with CGA, required min assistance to stand and to safely transfer using RW. Pt appears to have < vision on L side, was unable to locate chair on the L and required max cues to transfer from bed to chair as she passed the chair.  Pt demonstrated < sitting and standing balance, had a LOB while leaning forward to don her socks and required min assistance for standing balance during mobility for ADLs. Pt brushed her teeth with min assistance, washed her face with set up. Pt is below her functional baseline and would benefit from skilled OT services to address deficits in this setting and likely at next level of care. This section established at most recent assessment PROBLEM LIST (Impairments causing functional limitations): 1. Decreased ADL/Functional Activities 2. Decreased Transfer Abilities 3. Decreased Balance 4. Decreased Cognition INTERVENTIONS PLANNED: (Benefits and precautions of occupational therapy have been discussed with the patient.) 1. Activities of daily living training 2. Adaptive equipment training 3. Balance training 4. Cognitive training 5. Therapeutic activity 6. Therapeutic exercise TREATMENT PLAN: Frequency/Duration: Follow patient 3 times/ week to address above goals. Rehabilitation Potential For Stated Goals: Fair RECOMMENDED REHABILITATION/EQUIPMENT: (at time of discharge pending progress): Due to the probability of continued deficits (see above) this patient will likely need continued skilled occupational therapy after discharge. Equipment:  
? None at this time OCCUPATIONAL PROFILE AND HISTORY:  
History of Present Injury/Illness (Reason for Referral): 
See H&P Past Medical History/Comorbidities:  
Ms. Barbara Muir  has a past medical history of Anemia of other chronic disease (4/26/2014); Anxiety; Arrhythmia; CAD (coronary artery disease) (20O7); Chronic systolic heart failure ([address] Coagulation disorder ([address] Congestive heart failure ([address] (08/2007); Contusion of chest wall (4/29/2012); Coronary artery disease involving autologous vein coronary bypass graft without angina pectoris (6/5/2015); Falls (4/23/2014); Generalized OA (6/5/2015); Heart failure ([address]  Hip pain (4/26/2014); History of skin cancer (1992); [place] (hard of hearing); Hygroma (4/23/2014); Hyperglycemia (4/29/2012); Hyperlipidemia; Hypertension; Hypotension (4/23/2014); Mild dementia (12/3/2014); Nausea & vomiting; Neutrophilic leukocytosis (2/45/4437); Osteoarthritis; Osteoporosis; Osteoporosis (12/3/2014); Postmenopausal (12/3/2014); Postmenopausal bone loss; S/P cardiac pacemaker procedure (1/2011); S/P implantation of automatic cardioverter/defibrillator (AICD) (4/29/2012); S/P placement of cardiac pacemaker (4/29/2012); Shoulder fracture (1991); and Syncope (4/29/2012). Ms. Lina Tompkins  has a past surgical history that includes pr biopsy of skin lesion (1992); hx bladder suspension (1995?); hx juan and bso (1984); hx tonsillectomy (as a child); hx heart catheterization (2007); hx shoulder arthroscopy (1991); hx pacemaker (1/13/2011); and hx ptca (08/2007). Social History/Living Environment:  
Home Environment: Private residence # Steps to Enter: 0 One/Two Story Residence: One story Living Alone: No 
Support Systems: Family member(s), Child(ian) Patient Expects to be Discharged to[de-identified] Private residence Current DME Used/Available at Home: Grab bars, Safety frame toliet, Shower chair, Walker, rolling, Wheelchair Tub or Shower Type: Shower Prior Level of Function/Work/Activity: 
Pt lives with her daughter who provides 24 hr assistance and reports that she was independent with dressing, toileting, and grooming and required assistance with bathing. Pt uses a RW for mobility, stated that she has a walk in shower with grab bars and a shower chair. Pt reports that her grandson is the one who assists her with bathing. Number of Personal Factors/Comorbidities that affect the Plan of Care: Expanded review of therapy/medical records (1-2):  MODERATE COMPLEXITY ASSESSMENT OF OCCUPATIONAL PERFORMANCE[de-identified]  
Activities of Daily Living:  
Basic ADLs (From Assessment) Complex ADLs (From Assessment) Feeding: Setup Oral Facial Hygiene/Grooming: Minimum assistance Bathing: Moderate assistance Upper Body Dressing: Minimum assistance Lower Body Dressing: Minimum assistance Toileting: Minimum assistance Instrumental ADL Meal Preparation: Maximum assistance Homemaking: Maximum assistance Medication Management: Maximum assistance Financial Management: Maximum assistance Grooming/Bathing/Dressing Activities of Daily Living Grooming Washing Face: Supervision/set-up Brushing Teeth: Minimum assistance Cognitive Retraining Safety/Judgement:  (decreased safety awareness, slightly impulsive) Lower Body Dressing Assistance Socks: Minimum assistance Bed/Mat Mobility Supine to Sit: Contact guard assistance Sit to Stand: Minimum assistance Bed to Chair: Minimum assistance Scooting: Contact guard assistance Most Recent Physical Functioning:  
Gross Assessment: 
  
         
  
Posture: 
  
Balance: 
Sitting: Impaired Sitting - Static: Good (unsupported) Sitting - Dynamic: Fair (occasional) Standing: Impaired Standing - Static: Fair Standing - Dynamic : Poor Bed Mobility: 
Supine to Sit: Contact guard assistance Scooting: Contact guard assistance Wheelchair Mobility: 
  
Transfers: 
Sit to Stand: Minimum assistance Stand to Sit: Minimum assistance Bed to Chair: Minimum assistance Patient Vitals for the past 6 hrs: 
 BP SpO2 Pulse 09/12/18 0446 119/53 - 60  
09/12/18 0500 138/64 - 66  
09/12/18 0515 142/70 - 65  
09/12/18 0530 128/55 - 60  
09/12/18 0546 136/61 - 60  
09/12/18 0600 135/63 - 60  
09/12/18 0701 96/64 - 61  
09/12/18 0716 (!) 136/91 - 60  
09/12/18 0732 (!) 85/52 - 60  
09/12/18 0751 (!) 125/94 - 72  
09/12/18 0833 126/49 - 65  
09/12/18 0834 126/49 - 64  
09/12/18 0912 - 100 % 64 Mental Status Neurologic State: Alert Orientation Level: Oriented to person, Disoriented to place, Disoriented to situation, Disoriented to time Cognition: Follows commands, Decreased attention/concentration, Memory loss Perception: Cues to attend left visual field Perseveration: No perseveration noted Safety/Judgement:  (decreased safety awareness, slightly impulsive) Physical Skills Involved: 
1. Balance 2. Strength 3. Vision Cognitive Skills Affected (resulting in the inability to perform in a timely and safe manner): 1. Perception 2. Executive Function 3. Short Term Recall 4. Long Term Memory 5. Sustained Attention 6. Divided Attention 7. Comprehension Psychosocial Skills Affected: 1. Habits/Routines 2. Environmental Adaptation Number of elements that affect the Plan of Care: 5+:  HIGH COMPLEXITY CLINICAL DECISION MAKING:  
[facility] AM-PAC 6 Clicks Daily Activity Inpatient Short Form How much help from another person does the patient currently need. .. Total A Lot A Little None 1. Putting on and taking off regular lower body clothing? [] 1   [] 2   [x] 3   [] 4  
2. Bathing (including washing, rinsing, drying)? [] 1   [x] 2   [] 3   [] 4  
3. Toileting, which includes using toilet, bedpan or urinal?   [] 1   [] 2   [x] 3   [] 4  
4. Putting on and taking off regular upper body clothing? [] 1   [] 2   [x] 3   [] 4  
5. Taking care of personal grooming such as brushing teeth? [] 1   [] 2   [x] 3   [] 4  
6. Eating meals? [] 1   [] 2   [] 3   [x] 4  
© 2007, Trustees of [facility], under license to [employer]. All rights reserved Score:  Initial: 18 Most Recent: X (Date: -- ) Interpretation of Tool:  Represents activities that are increasingly more difficult (i.e. Bed mobility, Transfers, Gait). Score 24 23 22-20 19-15 14-10 9-7 6 Modifier CH CI CJ CK CL CM CN   
 
? Self Care:  
  - CURRENT STATUS: CK - 40%-59% impaired, limited or restricted  - GOAL STATUS: CJ - 20%-39% impaired, limited or restricted  - D/C STATUS:  ---------------To be determined--------------- [place] / [place] B / Product Type: Medicare /   
 
Medical Necessity:    
· Patient is expected to demonstrate progress in balance and functional technique to increase independence with ADLs and improve safety during DLs. Reason for Services/Other Comments: 
· Patient continues to require present interventions due to patient's inability to safely and independently complete ADLs. Use of outcome tool(s) and clinical judgement create a POC that gives a: MODERATE COMPLEXITY  
 
 
 
TREATMENT:  
(In addition to Assessment/Re-Assessment sessions the following treatments were rendered) Pre-treatment Symptoms/Complaints:   
Pain: Initial:  
Pain Intensity 1: 0  Post Session:  0 Self Care: (15 min): Procedure(s) (per grid) utilized to improve and/or restore self-care/home management as related to dressing and grooming. Required moderate visual, verbal and tactile cueing to facilitate activities of daily living skills and compensatory activities. Braces/Orthotics/Lines/Etc:  
· ICU monitoring · O2 Device: Room air Treatment/Session Assessment:   
· Response to Treatment:  No adverse reaction · Interdisciplinary Collaboration:  
o Occupational Therapist 
o Registered Nurse · After treatment position/precautions:  
o Up in chair 
o Bed alarm/tab alert on 
o Bed/Chair-wheels locked 
o Call light within reach 
o RN notified · Compliance with Program/Exercises: Will assess as treatment progresses. · Recommendations/Intent for next treatment session: \"Next visit will focus on advancements to more challenging activities and reduction in assistance provided\".  
Total Treatment Duration: 
  
  
Imani Daly, OT

## 2018-09-12 NOTE — PROGRESS NOTES
TRANSFER - IN REPORT: 
 
Verbal report received from Leilani Jade RN(name) on [address] [place] [address]  being received from ICU, room 3108(unit) for routine progression of care Report consisted of patients Situation, Background, Assessment and  
Recommendations(SBAR). Information from the following report(s) SBAR was reviewed with the receiving nurse. Opportunity for questions and clarification was provided. Assessment completed upon patients arrival to unit and care assumed.

## 2018-09-12 NOTE — PROGRESS NOTES
Initial visit was made, prayer, emotional support and a spiritual presence were provided.  card was left with the patient.  met her daughter, Alexandru Caldwell. Navjot Barajas

## 2018-09-12 NOTE — PROGRESS NOTES
Received bedside shift report from Wagner Ocasio [facility]. Patient is alert and oriented, on room air, patient's face is bruised with slight laceration under left eye, left eye is swollen from fall prior to admission, patient denies pain, external female catheter is patent and draining, patient has scattered bruising, feet are warm and red, patient is in a paced normal sinus rhythm, vital signs stable, will continue to monitor.

## 2018-09-12 NOTE — PROGRESS NOTES
LEAPFROG PROTOCOL NOTE Hilda Dumont 9/11/2018 The patient is currently in the critical care setting managed by Dr. Mack Vincent and Dr. Nicole Chaudhary with [facility] The patient's chart is reviewed and the patient is discussed with the staff. Patient is currently hemodynamically stable and is getting q4h neuro checks. Patient has no needs identified for Intensivist management in the critical care setting at this time. Please notify us if can be of assistance. No charge billed to the patient. Thank you.  
 
Slime Sims MD

## 2018-09-12 NOTE — PROGRESS NOTES
Rounded hourly on patient throughout the shift. Needs met at this time.  Will continue to monitor patient and report off to day shift RN

## 2018-09-12 NOTE — PROGRESS NOTES
Problem: Mobility Impaired (Adult and Pediatric) Goal: *Acute Goals and Plan of Care (Insert Text) LTG: 
(1.)Ms. Alessandra Oconnor will move from supine to sit and sit to supine , scoot up and down and roll side to side with SUPERVISION within 7 treatment day(s) from flat surface without handrail. (2.)Ms. Alessandra Oconnor will transfer from bed to chair and chair to bed with STAND BY ASSIST using the least restrictive device within 7 treatment day(s). (3.)Ms. Alessandra Oconnor will ambulate with STAND BY ASSIST for 150+ feet with the least restrictive device within 7 treatment day(s), while maintaining normal vital signs. (4.)Ms. Alessandra Oconnor will perform there ex in sitting or standing with SBA within 7 treatment days for increased strength and mobility.  
_____________________________________________________________________________________________ PHYSICAL THERAPY: Initial Assessment, PM 9/12/2018 INPATIENT: Hospital Day: 2 [place] / Product Type: Medicare /  
  
NAME/AGE/GENDER: Brett Lira is a 80 y.o. female PRIMARY DIAGNOSIS: Subdural hematoma (HCC) Subdural hematoma (HCC) Subdural hematoma (HCC) ICD-10: Treatment Diagnosis:  
 · Generalized Muscle Weakness (M62.81) · Difficulty in walking, Not elsewhere classified (R26.2) · History of falling (Z91.81) Precaution/Allergies: 
Celebrex [celecoxib] and Other medication ASSESSMENT:  
 
Ms. Alessandra Oconnor presents with decreased bed mobility, transfers, ambulation, balance, activity tolerance, strength and overall general functional mobility s/p hospital admission with fall at home resulting in subdural hematoma. Pt presenting now in ICU, B facial bruising and edema, cut below L eye, decreased vision. Pt A & O to self only, daughter present who states pt \"gets like this sometimes\" indicating pt normally gets confused at times. Pt follows directions but easily distracted.  Pt relates frequent falls at home in past 3 months, unable to recall what happened at home prior to this fall. Pt leaning to R in chair, able to correct with cues. Pt MIN A for transfers and ambulation short distance with RW. Pt with R lean in standing, cues for posture and L side awareness for steppage. Pt required tactile and verbal cues for L LE advancement at times, attempting to turn in repeated [place] at one point. Pt returned to supine with MIN A, left with lines intact, family present, RN made aware. Pt BP in sitting prior to mobility 123/55; after mobility 140/69. PT to cont to follow for acute care needs, pt functioning below her baseline level of mod I in home. Pt would benefit from further skilled PT services at discharge, family requesting rehab. This section established at most recent assessment PROBLEM LIST (Impairments causing functional limitations): 1. Decreased Strength 2. Decreased ADL/Functional Activities 3. Decreased Transfer Abilities 4. Decreased Ambulation Ability/Technique 5. Decreased Balance 6. Decreased Activity Tolerance 7. Decreased [place] with Home Exercise Program 
8. Decreased Cognition INTERVENTIONS PLANNED: (Benefits and precautions of physical therapy have been discussed with the patient.) 1. Balance Exercise 2. Bed Mobility 3. Family Education 4. Gait Training 5. Home Exercise Program (HEP) 6. Therapeutic Activites 7. Therapeutic Exercise/Strengthening 8. Transfer Training 9. Group Therapy TREATMENT PLAN: Frequency/Duration: 3 times a week for duration of hospital stay Rehabilitation Potential For Stated Goals: Fair RECOMMENDED REHABILITATION/EQUIPMENT: (at time of discharge pending progress): Due to the probability of continued deficits (see above) this patient will likely need continued skilled physical therapy after discharge. Equipment:  
? to be determined HISTORY:  
History of Present Injury/Illness (Reason for Referral): 
 This is a 80-year-old female patient who has a past medical history of systolic congestive heart failure with an ejection fraction of 30%, coronary artery disease status post ICD placement, who came into the emergency room after she had the mechanical fall and saw for a head injury. 
  
According to her family, the patient has been having difficulty walking around. This morning when she was getting out of bed and going to the bathroom, she lost her balance and fell down suffering an injury on the left side of her face. Her family says that she did not lose consciousness and they are sure it was just a mechanical fall. She developed a hematoma around her left eye and in the left forehead. She has home services and by the time a visiting nurse and physical therapy arrived at home, they recommended her coming to the emergency room to be checked. When she arrived here, her vital signs were blood pressure of 140/70, heart rate of 60, respiratory rate of 18, O2 saturation of 98% at room air. She was awake and alert and she had a large left periorbital hematoma. Her initial blood workup reported normal white blood cell count, stable hemoglobin, stable creatinine level. A CT scan of the head was done and reported a focal density adjacent to the left frontal lobe concerning for a tiny acute subdural hematoma. A moderate sized forehead hematoma with subsequent edematous changes likely represented additional contusion overlying the left face. No acute osseous abnormality was reported. The case was discussed with Neurosurgery who recommended holding antiplatelet and anticoagulant medications and repeating a CT scan of the head in the morning. No acute neurosurgical intervention was considered necessary.   The patient was presented to the hospitalist team to be admitted. 
  
Past Medical History/Comorbidities:  
Ms. Nakul Reyes  has a past medical history of Anemia of other chronic disease (4/26/2014); Anxiety; Arrhythmia; CAD (coronary artery disease) (20O7); Chronic systolic heart failure ([facility] Coagulation disorder ([facility] Congestive heart failure ([facility] (08/2007); Contusion of chest wall (4/29/2012); Coronary artery disease involving autologous vein coronary bypass graft without angina pectoris (6/5/2015); Falls (4/23/2014); Generalized OA (6/5/2015); Heart failure ([facility] Hip pain (4/26/2014); History of skin cancer (1992); [place] (hard of hearing); Hygroma (4/23/2014); Hyperglycemia (4/29/2012); Hyperlipidemia; Hypertension; Hypotension (4/23/2014); Mild dementia (12/3/2014); Nausea & vomiting; Neutrophilic leukocytosis (3/18/3036); Osteoarthritis; Osteoporosis; Osteoporosis (12/3/2014); Postmenopausal (12/3/2014); Postmenopausal bone loss; S/P cardiac pacemaker procedure (1/2011); S/P implantation of automatic cardioverter/defibrillator (AICD) (4/29/2012); S/P placement of cardiac pacemaker (4/29/2012); Shoulder fracture (1991); and Syncope (4/29/2012). Ms. Ruthanne Leyden  has a past surgical history that includes pr biopsy of skin lesion (1992); hx bladder suspension (1995?); hx juan and bso (1984); hx tonsillectomy (as a child); hx heart catheterization (2007); hx shoulder arthroscopy (1991); hx pacemaker (1/13/2011); and hx ptca (08/2007). Social History/Living Environment:  
Home Environment: Private residence # Steps to Enter: 0 One/Two Story Residence: One story Living Alone: No 
Support Systems: Family member(s), Child(ian) Patient Expects to be Discharged to[de-identified] Private residence Current DME Used/Available at Home: Grab bars, Safety frame toliet, Shower chair, Walker, rolling, Wheelchair Tub or Shower Type: Shower Prior Level of Function/Work/Activity: 
Lives with daughter; uses RW for ambulation; set up for ADLs; w/c in community; frequent falls Personal Factors:   
      Sex:  female Age:  [address] y.o. Overall Behavior:  agreeable Number of Personal Factors/Comorbidities that affect the Plan of Care: 
Smoker, pacemaker, age, CHF 3+: HIGH COMPLEXITY EXAMINATION:  
Most Recent Physical Functioning:  
Gross Assessment: 
AROM: Generally decreased, functional (R > L) Strength: Generally decreased, functional (B LE grossly 3+/5, some difficulty following direction) Coordination: Generally decreased, functional 
Sensation: Intact (to light touch per pt) Posture: 
Posture (WDL): Exceptions to [facility] Posture Assessment: Forward head, Rounded shoulders (leaning to R) Balance: 
Sitting: Impaired Sitting - Static: Good (unsupported) Sitting - Dynamic: Fair (occasional) Standing: Impaired Standing - Static: Constant support; Fair 
Standing - Dynamic : Poor Bed Mobility: 
Supine to Sit:  (in chair on arrival) Sit to Supine: Minimum assistance Scooting: Minimum assistance Wheelchair Mobility: 
  
Transfers: 
Sit to Stand: Minimum assistance Stand to Sit: Minimum assistance Gait: 
  
Base of Support: Narrowed; Shift to right Speed/Divya: Pace decreased (<100 feet/min); Shuffled Step Length: Left shortened;Right shortened Swing Pattern: Left asymmetrical;Right asymmetrical 
Gait Abnormalities: Decreased step clearance;Shuffling gait (leaning to R at times) Distance (ft): 5 Feet (ft) (in room ) Assistive Device: Gait belt;Walker, rolling Ambulation - Level of Assistance: Minimal assistance Interventions: Safety awareness training; Tactile cues; Verbal cues (for foot advancement, walker safety, posture) Body Structures Involved: 1. Muscles Body Functions Affected: 1. Movement Related Activities and Participation Affected: 1. General Tasks and Demands 2. Mobility 3. Self Care Number of elements that affect the Plan of Care: 4+: HIGH COMPLEXITY CLINICAL PRESENTATION:  
Presentation: Evolving clinical presentation with changing clinical characteristics: MODERATE COMPLEXITY CLINICAL DECISION MAKING:  
 [facility] Basic Mobility Inpatient Short Form How much difficulty does the patient currently have. .. Unable A Lot A Little None 1. Turning over in bed (including adjusting bedclothes, sheets and blankets)? [] 1   [] 2   [x] 3   [] 4  
2. Sitting down on and standing up from a chair with arms ( e.g., wheelchair, bedside commode, etc.)   [] 1   [] 2   [x] 3   [] 4  
3. Moving from lying on back to sitting on the side of the bed? [] 1   [] 2   [x] 3   [] 4 How much help from another person does the patient currently need. .. Total A Lot A Little None 4. Moving to and from a bed to a chair (including a wheelchair)? [] 1   [] 2   [x] 3   [] 4  
5. Need to walk in hospital room? [] 1   [] 2   [x] 3   [] 4  
6. Climbing 3-5 steps with a railing? [] 1   [x] 2   [] 3   [] 4  
© 2007, Trustees of [facility] [employer], under license to [employer]. All rights reserved Score:  Initial: 17 Most Recent: X (Date: -- ) Interpretation of Tool:  Represents activities that are increasingly more difficult (i.e. Bed mobility, Transfers, Gait). Score 24 23 22-20 19-15 14-10 9-7 6 Modifier CH CI CJ CK CL CM CN   
 
? Mobility - Walking and Moving Around:  
  - CURRENT STATUS: CK - 40%-59% impaired, limited or restricted  - GOAL STATUS: CJ - 20%-39% impaired, limited or restricted  - D/C STATUS:  ---------------To be determined--------------- [place] / [place] / Product Type: Medicare /   
 
Medical Necessity:    
· Patient is expected to demonstrate progress in strength, range of motion, balance and coordination to decrease assistance required with overall functional mobility, transfers, ambulation. · Patient demonstrates good rehab potential due to higher previous functional level.  
Reason for Services/Other Comments: 
· Patient continues to require present interventions due to patient's inability to perform bed mobility, transfers, ambulation safely and effectively. Use of outcome tool(s) and clinical judgement create a POC that gives a: Questionable prediction of patient's progress: MODERATE COMPLEXITY  
  
 
 
 
TREATMENT:  
(In addition to Assessment/Re-Assessment sessions the following treatments were rendered) Pre-treatment Symptoms/Complaints:  \"I need to go upstairs to get changed\" Pain: Initial:  
Pain Intensity 1: 0  Post Session:  0/10 post session in supine Therapeutic Activity: (    10 min): Therapeutic activities including Chair transfers, Ambulation on level ground and weight shifting, walker safety to improve mobility, strength, balance and coordination. Required minimal Safety awareness training; Tactile cues; Verbal cues (for foot advancement, walker safety, posture) to promote static and dynamic balance in standing and promote coordination of bilateral, lower extremity(s). Braces/Orthotics/Lines/Etc:  
· IV · pure wick, ICU lines and monitors · O2 Device: Room air Treatment/Session Assessment:   
· Response to Treatment:  Fair tolerance, fatigued quickly · Interdisciplinary Collaboration:  
o Physical Therapist 
o Registered Nurse · After treatment position/precautions:  
o Supine in bed 
o Bed/Chair-wheels locked 
o Bed in low position 
o Call light within reach 
o RN notified 
o Family at bedside · Compliance with Program/Exercises: Will assess as treatment progresses. · Recommendations/Intent for next treatment session: \"Next visit will focus on advancements to more challenging activities\". Total Treatment Duration: PT Patient Time In/Time Out Time In: 1240 Time Out: 2834 Trevin Lopez, PT

## 2018-09-12 NOTE — PROGRESS NOTES
09/12/18 1615 Dual Skin Pressure Injury Assessment Dual Skin Pressure Injury Assessment X Second Care Provider (Based on [address] [place] [address]) Dimitry Larkin RN Facial Bony Promineces Forehead;Nasal Bridge;Left Cheeck; Right Cheeck (brusing, with an open wound to left face cheek) Buttocks/[place]  Bilateral 
(brusing to bilateral buttocks cheeks) Patient transferred from ICU. Patient has a knot to the forehead, a gash on the left cheek of the face with bilateral bruising with red/purple areas on and under the eyes. Bilateral arm bruising and buttocks bruising with an Allevyn in place on sacral area. Generalized bruising on patients body. Patient oriented to the room and the use of the call light with verbal and demonstrated feed back. Will continue to monitor patient.

## 2018-09-12 NOTE — PROGRESS NOTES
TRANSFER - OUT REPORT: 
 
Verbal report given to Leeanna Jefferson RN (name) on Hilda Dumont  being transferred to 6th floor (unit) for routine progression of care Report consisted of patients Situation, Background, Assessment and  
Recommendations(SBAR). Information from the following report(s) SBAR, Kardex, [facility] and Cardiac Rhythm paced was reviewed with the receiving nurse. Opportunity for questions and clarification was provided. Patient transported with: 
Monitor.

## 2018-09-12 NOTE — PROGRESS NOTES
TRANSFER - IN REPORT: 
 
Verbal report received from Alex Rodriguez RN on Galo Maya  being received from ED for routine progression of care Report consisted of patients Situation, Background, Assessment and  
Recommendations(SBAR). Information from the following report(s) SBAR, Kardex, ED Summary, MAR, Recent Results and Cardiac Rhythm NSR was reviewed with the receiving nurse. Opportunity for questions and clarification was provided. Assessment completed upon patients arrival to unit and care assumed.
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
## 2018-09-12 NOTE — PROGRESS NOTES
[facility] - INPATIENT Face to Face Encounter Patients Name: Roberto Bridges    YOB: 1928 Ordering Physician: Dr. Lupe Kingsley Primary Diagnosis: Subdural hematoma (HCC) Date of Face to Face:   9/12/2018 Face to Face Encounter findings are related to primary reason for home care:   yes. 1. I certify that the patient needs intermittent care as follows: skilled nursing care:  skilled observation/assessment, patient education 
physical therapy: strengthening, stretching/ROM, transfer training, gait/stair training, balance training and pt/caregiver education 
occupational therapy:  ADL safety (ie. cooking, bathing, dressing), ROM and pt/caregiver education 2. I certify that this patient is homebound, that is: 1) patient requires the use of a walker device, special transportation, or assistance of another to leave the home; or 2) patient's condition makes leaving the home medically contraindicated; and 3) patient has a normal inability to leave the home and leaving the home requires considerable and taxing effort. Patient may leave the home for infrequent and short duration for medical reasons, and occasional absences for non-medical reasons. Homebound status is due to the following functional limitations: Patient with strength deficits limiting the performance of all ADL's without caregiver assistance or the use of an assistive device. 3. I certify that this patient is under my care and that I, or a nurse practitioner or  [id number], or clinical nurse specialist, or certified nurse midwife, working with me, had a Face-to-Face Encounter that meets the physician Face-to-Face Encounter requirements. The following are the clinical findings from the [address] [place] [address] encounter that support the need for skilled services and is a summary of the encounter: see hospital chart See summary of the patient's illness Alex Elder LMSW 
9/12/2018 THE FOLLOWING TO BE COMPLETED BY THE COMMUNITY PHYSICIAN: 
 
I concur with the findings described above from the F2F encounter that this patient is homebound and in need of a skilled service. Certifying Physician: _____________________________________ Printed Certifying Physician Name: _____________________________________ Date: _________________

## 2018-09-12 NOTE — ED NOTES
Christa Aaron, RN in ICU  Requested to be called back in 5 minutes to take report for 3108. This RN to return call.

## 2018-09-12 NOTE — PROGRESS NOTES
Pt to 3108 from ED. Pt is alert and oriented x4, no neuro deficits noted. -170, will medicate per MAR. . Other VSS. Full assessment as charted. Dual skin assessment completed upon admission with Angel Arredondo RN. No pressure injuries noted, allevyn applied. Contusion noted to L eye/forehead from earlier fall. Multiple bruises and abrasion noted to BLE. Psoriatic area noted to RUE. No other issues noted.

## 2018-09-12 NOTE — PROGRESS NOTES
LMSW met with daughter at bedside to discuss care planning. Daughter requests [facility] for pt rehab placement as she was there before and daughter was pleased with care. Provided SNF list for daughter to review for possible additional referrals if [facility] cannot accommodate pt care. Care Management Interventions PCP Verified by CM:  (Nicolette Montemayor MD) Mode of Transport at Discharge:  (family/[place]) Transition of Care Consult (CM Consult):  (Pt is insured with pharmacy benefits.) Discharge Durable Medical Equipment:  (pt has a rolling walker) Physical Therapy Consult: Yes Occupational Therapy Consult: Yes Speech Therapy Consult: No 
Current Support Network: Lives with Caregiver (Pt resides with and is cared for by her daughter, Ganga Armstrong.) Confirm Follow Up Transport: Family Plan discussed with Pt/Family/Caregiver: Yes Freedom of Choice Offered: Yes [employer] Information Provided?: No 
Discharge Location Discharge Placement: Skilled nursing facility

## 2018-09-12 NOTE — H&P
Joana Kaplan [address] HISTORY AND PHYSICAL General Huan 
MR#: [id number] : 1928 ACCOUNT #: [de-identified] ADMIT DATE: 2018 TIME OF ADMISSION:  8 p.m. CHIEF COMPLAINT:  \"I fell down. \" HISTORY OF PRESENT ILLNESS:  This is a 43-year-old female patient who has a past medical history of systolic congestive heart failure with an ejection fraction of 30%, coronary artery disease status post ICD placement, who came into the emergency room after she had the mechanical fall and saw for a head injury. According to her family, the patient has been having difficulty walking around. This morning when she was getting out of bed and going to the bathroom, she lost her balance and fell down suffering an injury on the left side of her face. Her family says that she did not lose consciousness and they are sure it was just a mechanical fall. She developed a hematoma around her left eye and in the left forehead. She has home services and by the time a visiting nurse and physical therapy arrived at home, they recommended her coming to the emergency room to be checked. When she arrived here, her vital signs were blood pressure of 140/70, heart rate of 60, respiratory rate of 18, O2 saturation of 98% at room air. She was awake and alert and she had a large left periorbital hematoma. Her initial blood workup reported normal white blood cell count, stable hemoglobin, stable creatinine level. A CT scan of the head was done and reported a focal density adjacent to the left frontal lobe concerning for a tiny acute subdural hematoma. A moderate sized forehead hematoma with subsequent edematous changes likely represented additional contusion overlying the left face. No acute osseous abnormality was reported.   The case was discussed with Neurosurgery who recommended holding antiplatelet and anticoagulant medications and repeating a CT scan of the head in the morning. No acute neurosurgical intervention was considered necessary. The patient was presented to the hospitalist team to be admitted. REVIEW OF SYSTEMS:  A review of 14 systems was performed and it was negative except for the findings that are reported in the HPI. PAST MEDICAL HISTORY: 
1. Coronary artery disease. 2.  Status post AICD implantation. 3.  Bilateral hearing loss. 4.  Mechanical fall. 5.  Osteoarthritis. 6.  Chronic systolic congestive heart failure. PAST SURGICAL HISTORY: 
1.  Skin biopsies. 2.  Bladder sling. 3.  Left heart catheterization with stent placement. 4.  Shoulder arthroscopy. 5.  AICD implantation. SOCIAL HISTORY:  Denies smoking, alcohol use or illicit drug use. FAMILY HISTORY:  Positive for coronary artery disease, systolic congestive heart failure, stroke. ALLERGIES: 
1.  CELEBREX. 
2.  UNKNOWN ANTIBIOTIC. PHYSICAL EXAMINATION: 
VITAL SIGNS:  Blood pressure 153/73, heart rate 62, respiratory rate of 18, O2 saturation of 98% at room air. EYES:  PERRLA. EARS, NOSE, MOUTH AND THROAT:  There is no evidence of pharyngeal erythema, edema or purulent exudates. RESPIRATORY:  Clear breath sounds bilaterally. HEART:  Regular rate and rhythm with no murmurs. GASTROINTESTINAL:  Abdomen is soft and nontender with positive bowel sounds. GENITOURINARY:  Unremarkable. MUSCULOSKELETAL:  No evidence of severe trauma except for a large ecchymosis on the left periorbital area and left forehead. NEUROLOGIC:  Patient is alert and oriented with no evidence of focal weakness. PSYCHIATRIC:  Normal mood. HEMATOLOGIC, LYMPHATIC, IMMUNOLOGIC:  Large ecchymosis on the left side of her face. LABORATORY AND IMAGING DATA:  White blood cell count 8.1, hemoglobin 12.3, platelet count 469. Sodium 144, potassium 5.2, CO2 25, glucose 83, BUN 19, creatinine 1. 16. ALT 12, AST 12.  CK 65, glucose 83.   CT scan of the head as previously described in the history of present illness. EKG seen and analyzed by me, normal sinus rhythm, nonspecific ST and T-wave abnormality in the lateral leads. ASSESSMENT:  This is a 59-year-old female patient with an extensive past medical history who came into the emergency room after she had the mechanical fall and she suffered a left facial trauma. A CT scan of the head is concerning for a small subdural hematoma. She is going to be admitted to the intensive care unit and her initial length of stay is calculated to be more than 2 midnights. PLAN: 
1. Small left frontal subdural hematoma. The patient is going to be admitted to the intensive care unit and neuro checks will be done q.4 hours. A CT scan of the head is going to be repeated in the morning. Neurosurgery has been made aware of her case and they will reevaluate her in the morning. Plavix has been stopped for now. Her blood pressure is going to be kept below 140/90. 
2.  History of systolic congestive heart failure. For now, the patient will receive gentle hydration with IV fluids for only 12 hours. I will continue with her regular medications for CHF except for Lasix. She might need some extra medication for blood pressure to keep her systolic blood pressure below 525 and her diastolic blood pressure below 90. 
3. Anxiety. Ativan p.r.n. 
4.  History of COPD. I will continue using p.r.n. nebulizations with albuterol. 5.  Deep venous thrombosis prophylaxis, bilateral compression devices. MD KATHRIN Deng/MIRI 
D: 09/11/2018 20:58    
T: 09/11/2018 21:22 
JOB #: [id number]

## 2018-09-12 NOTE — ED NOTES
TRANSFER - OUT REPORT: 
 
Verbal report given to Rusty Munoz on Javier Challenger  being transferred to 3108(unit) for routine progression of care Report consisted of patients Situation, Background, Assessment and  
Recommendations(SBAR). Information from the following report(s) SBAR was reviewed with the receiving nurse. Lines:  
Peripheral IV 09/11/18 Right Antecubital (Active) Site Assessment Clean, dry, & intact 9/11/2018  8:17 PM  
Phlebitis Assessment 0 9/11/2018  8:17 PM  
Infiltration Assessment 0 9/11/2018  8:17 PM  
Dressing Status Clean, dry, & intact 9/11/2018  8:17 PM  
Hub Color/Line Status Pink 9/11/2018  8:17 PM  
   
Peripheral IV 09/11/18 Left Forearm (Active) Site Assessment Clean, dry, & intact 9/11/2018  6:00 PM  
Phlebitis Assessment 0 9/11/2018  6:00 PM  
Infiltration Assessment 0 9/11/2018  6:00 PM  
Dressing Status Clean, dry, & intact 9/11/2018  6:00 PM  
  
 
Opportunity for questions and clarification was provided. Patient transported with: 
 Registered Nurse

## 2018-09-13 PROBLEM — N39.0 UTI (URINARY TRACT INFECTION): Status: ACTIVE | Noted: 2018-01-01

## 2018-09-13 NOTE — PROGRESS NOTES
[facility] OUTREACH NURSE PROGRESS REPORT SUBJECTIVE: Called to assess patient secondary to transfer out of ICU. MEWS Score: 2 (09/13/18 0452) Vitals:  
 09/12/18 1942 09/13/18 7554 09/13/18 6967 09/13/18 1416 BP: 114/48 110/55  142/67 Pulse: 61 63  65 Resp: 22 22  20 Temp: 97.9 °F (36.6 °C) 97.7 °F (36.5 °C)  97.4 °F (36.3 °C) SpO2: 97% 98%  96% Weight:   46.8 kg (103 lb 3.2 oz) LAB DATA: 
 
Recent Labs  
   09/12/18 
 0403  09/11/18 
 1728 NA  143  144  
K  4.4  5.2*  
CL  109*  109* CO2  26  25 AGAP  8  10 GLU  83  83 BUN  16  19 CREA  1.06*  1.16* GFRAA  >60  56* GFRNA  52*  47*  
CA  9.9  9.5 MG  2.5*  2.5* ALB   --   2.8*  
TP   --   6.9 GLOB   --   4.1* AGRAT   --   0.7* ALT   --   12 Recent Labs  
   09/12/18 
 0403  09/11/18 
 1728 WBC  8.2  8.1 HGB  12.5  12.3 HCT  40.5  39.1 PLT  191  213 OBJECTIVE: On arrival to room, I found patient to be sitting in chair. Family at bedside Pain Assessment Pain Intensity 1: 0 (09/13/18 1100) Patient Stated Pain Goal: 0 
 
  
  
  
  
 
  
  
  
   
 
ASSESSMENT:  Pt is pleasantly confused, no pain or distress noted. Lungs CTA. 02 sat 97% on room air. Respirations even and unlabored. Pt with no complaints at this time. PLAN:  Will continue to follow per outreach protocol.   
 
Sampson Vergara RN

## 2018-09-13 NOTE — PROGRESS NOTES
Date of Outreach Update: 
Kesha Dyer was seen and assessed. MEWS Score: 1 (09/13/18 1611) Vitals:  
 09/13/18 1122 09/13/18 5691 09/13/18 1154 09/13/18 1611 BP:  142/67 122/57 123/56 Pulse:  65 61 61 Resp:  20 20 20 Temp:  97.4 °F (36.3 °C) 97.6 °F (36.4 °C) 97.8 °F (36.6 °C) SpO2:  96% 91% 95% Weight: 46.8 kg (103 lb 3.2 oz) Pain Assessment Pain Intensity 1: 0 (09/13/18 1500) Patient Stated Pain Goal: 0 Previous Outreach assessment has been reviewed. There have been no significant clinical changes since the completion of the last dated Outreach assessment. Will continue to follow up per outreach protocol. Signed By:   Christopher Morocho   September 13, 2018 5:31 PM
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
## 2018-09-13 NOTE — PROGRESS NOTES
100 [facility] OUTREACH NURSE PROGRESS REPORT SUBJECTIVE:  to assess patient secondary to outreach protocol secondary transfer from unit. MEWS Score: 2 (09/12/18 1942) Vitals:  
 09/12/18 1502 09/12/18 1533 09/12/18 1620 09/12/18 1942 BP: 137/67 110/48 111/56 114/48 Pulse: 65 65 61 61 Resp: 22 22 22 22 Temp:   97.6 °F (36.4 °C) 97.9 °F (36.6 °C) SpO2: 100% 99% 98% 97% Weight:      
  
 
 
LAB DATA: 
 
Recent Labs  
   09/12/18 
 0403  09/11/18 
 1728 NA  143  144  
K  4.4  5.2*  
CL  109*  109* CO2  26  25 AGAP  8  10 GLU  83  83 BUN  16  19 CREA  1.06*  1.16* GFRAA  >60  56* GFRNA  52*  47*  
CA  9.9  9.5 MG  2.5*  2.5* ALB   --   2.8*  
TP   --   6.9 GLOB   --   4.1* AGRAT   --   0.7* ALT   --   12 Recent Labs  
   09/12/18 
 0403  09/11/18 
 1728 WBC  8.2  8.1 HGB  12.5  12.3 HCT  40.5  39.1 PLT  191  213 OBJECTIVE: On arrival to room, I found patient to be asleep. Respirations are even and unlabored. No s/sx of distress observed. Pain Assessment Pain Intensity 1: 0 (09/12/18 1947) Patient Stated Pain Goal: 0 
 
  
  
  
  
 
  
  
  
   
 
ASSESSMENT:  Multiple areas of bruising on face. PLAN:  Continue to monitor per outreach protocol.

## 2018-09-13 NOTE — PROGRESS NOTES
Spoke with patient's daughter and discussed that MD and therapy feel patient would benefit from SNF rehab. Patient's daughter states she is very picky as to where she would want her mother placed. Daughter states she would like referral sent to [facility]. Referral sent. Awaiting response.

## 2018-09-13 NOTE — PROGRESS NOTES
Date of Outreach Update: 
Lakeisha Blanca was seen and assessed. Previous Outreach assessment has been reviewed. There have been no significant clinical changes since the completion of the last dated Outreach assessment. Will continue to follow up per outreach protocol. Signed By:   Zohra Miller RN   September 13, 2018 3:17 AM

## 2018-09-13 NOTE — PROGRESS NOTES
Problem: Mobility Impaired (Adult and Pediatric) Goal: *Acute Goals and Plan of Care (Insert Text) LTG: 
(1.)Ms. Rachid Babcock will move from supine to sit and sit to supine , scoot up and down and roll side to side with SUPERVISION within 7 treatment day(s) from flat surface without handrail. (2.)Ms. Rachid Babcock will transfer from bed to chair and chair to bed with STAND BY ASSIST using the least restrictive device within 7 treatment day(s). (3.)Ms. Rachid Babcock will ambulate with STAND BY ASSIST for 150+ feet with the least restrictive device within 7 treatment day(s), while maintaining normal vital signs. (4.)Ms. Rachid Babcock will perform there ex in sitting or standing with SBA within 7 treatment days for increased strength and mobility.  
_____________________________________________________________________________________________ PHYSICAL THERAPY: Daily Note, Treatment Day: 1st, AM 9/13/2018 INPATIENT: Hospital Day: 3 [place] / [place] / Product Type: Medicare /  
  
NAME/AGE/GENDER: Steph Qiu is a 80 y.o. female PRIMARY DIAGNOSIS: Subdural hematoma (HCC) Subdural hematoma (HCC) Subdural hematoma (HCC) ICD-10: Treatment Diagnosis:  
 · Generalized Muscle Weakness (M62.81) · Difficulty in walking, Not elsewhere classified (R26.2) · History of falling (Z91.81) Precaution/Allergies: 
Celebrex [celecoxib] and Other medication ASSESSMENT:  
 
Ms. Rachid Babcock presents supine in bed, requesting to use the bathroom and bed and pt in disarray. Pt sat edge of bed with CGA and changed her gown as it was very soiled from coffee and different foods. Pt stood and ambulated into the bathroom with min assist.  Pt stood to rolling walker to finish being cleaned up and ambulated to sink to wash her hands. Pt requires increased assistance with balance. Pt then ambulated about 35' using rolling walker and min assist for safety and balance.   Pt returned to chair and was left with PCT present to assist with her bath. Pt is very pleasant and agreeable to therapy. Pt does require increased assistance and cues due to decreased safety awareness and decreased balance. Pt does well using rolling walker. Pt is making progress towards goals. Will continue with POC. Pt would benefit from further skilled PT services at discharge, family requesting rehab. This section established at most recent assessment PROBLEM LIST (Impairments causing functional limitations): 1. Decreased Strength 2. Decreased ADL/Functional Activities 3. Decreased Transfer Abilities 4. Decreased Ambulation Ability/Technique 5. Decreased Balance 6. Decreased Activity Tolerance 7. Decreased [place] with Home Exercise Program 
8. Decreased Cognition INTERVENTIONS PLANNED: (Benefits and precautions of physical therapy have been discussed with the patient.) 1. Balance Exercise 2. Bed Mobility 3. Family Education 4. Gait Training 5. Home Exercise Program (HEP) 6. Therapeutic Activites 7. Therapeutic Exercise/Strengthening 8. Transfer Training 9. Group Therapy TREATMENT PLAN: Frequency/Duration: 3 times a week for duration of hospital stay Rehabilitation Potential For Stated Goals: Fair RECOMMENDED REHABILITATION/EQUIPMENT: (at time of discharge pending progress): Due to the probability of continued deficits (see above) this patient will likely need continued skilled physical therapy after discharge. Equipment:  
? to be determined HISTORY:  
History of Present Injury/Illness (Reason for Referral): This is a 80-year-old female patient who has a past medical history of systolic congestive heart failure with an ejection fraction of 30%, coronary artery disease status post ICD placement, who came into the emergency room after she had the mechanical fall and saw for a head injury. 
  
According to her family, the patient has been having difficulty walking around. This morning when she was getting out of bed and going to the bathroom, she lost her balance and fell down suffering an injury on the left side of her face. Her family says that she did not lose consciousness and they are sure it was just a mechanical fall. She developed a hematoma around her left eye and in the left forehead. She has home services and by the time a visiting nurse and physical therapy arrived at home, they recommended her coming to the emergency room to be checked. When she arrived here, her vital signs were blood pressure of 140/70, heart rate of 60, respiratory rate of 18, O2 saturation of 98% at room air. She was awake and alert and she had a large left periorbital hematoma. Her initial blood workup reported normal white blood cell count, stable hemoglobin, stable creatinine level. A CT scan of the head was done and reported a focal density adjacent to the left frontal lobe concerning for a tiny acute subdural hematoma. A moderate sized forehead hematoma with subsequent edematous changes likely represented additional contusion overlying the left face. No acute osseous abnormality was reported. The case was discussed with Neurosurgery who recommended holding antiplatelet and anticoagulant medications and repeating a CT scan of the head in the morning. No acute neurosurgical intervention was considered necessary. The patient was presented to the hospitalist team to be admitted. 
  
Past Medical History/Comorbidities:  
Ms. Yola Lemons  has a past medical history of Anemia of other chronic disease (4/26/2014); Anxiety; Arrhythmia; CAD (coronary artery disease) (20O7); Chronic systolic heart failure ([address] Coagulation disorder ([address] Congestive heart failure ([address] (08/2007); Contusion of chest wall (4/29/2012); Coronary artery disease involving autologous vein coronary bypass graft without angina pectoris (6/5/2015);  Falls (4/23/2014); Generalized OA (6/5/2015); Heart failure ([facility] Hip pain (4/26/2014); History of skin cancer (1992); [place] (hard of hearing); Hygroma (4/23/2014); Hyperglycemia (4/29/2012); Hyperlipidemia; Hypertension; Hypotension (4/23/2014); Mild dementia (12/3/2014); Nausea & vomiting; Neutrophilic leukocytosis (5/03/3706); Osteoarthritis; Osteoporosis; Osteoporosis (12/3/2014); Postmenopausal (12/3/2014); Postmenopausal bone loss; S/P cardiac pacemaker procedure (1/2011); S/P implantation of automatic cardioverter/defibrillator (AICD) (4/29/2012); S/P placement of cardiac pacemaker (4/29/2012); Shoulder fracture (1991); and Syncope (4/29/2012). Ms. Douglas Pepe  has a past surgical history that includes pr biopsy of skin lesion (1992); hx bladder suspension (1995?); hx juan and bso (1984); hx tonsillectomy (as a child); hx heart catheterization (2007); hx shoulder arthroscopy (1991); hx pacemaker (1/13/2011); and hx ptca (08/2007). Social History/Living Environment:  
Home Environment: Private residence # Steps to Enter: 0 One/Two Story Residence: One story Living Alone: No 
Support Systems: Family member(s), Child(ian) Patient Expects to be Discharged to[de-identified] Private residence Current DME Used/Available at Home: Grab bars, Safety frame toliet, Shower chair, Walker, rolling, Wheelchair Tub or Shower Type: Shower Prior Level of Function/Work/Activity: 
Lives with daughter; uses RW for ambulation; set up for ADLs; w/c in community; frequent falls Personal Factors:   
      Sex:  female Age:  80 y.o. Overall Behavior:  agreeable Number of Personal Factors/Comorbidities that affect the Plan of Care: 
Smoker, pacemaker, age, CHF 3+: HIGH COMPLEXITY EXAMINATION:  
Most Recent Physical Functioning:  
Gross Assessment: 
  
         
  
Posture: 
  
Balance: 
Sitting - Static: Good (unsupported) Sitting - Dynamic: Fair (occasional) Standing - Static: Fair Standing - Dynamic : Poor Bed Mobility: Supine to Sit: Contact guard assistance Wheelchair Mobility: 
  
Transfers: 
Sit to Stand: Minimum assistance Stand to Sit: Minimum assistance Gait: 
  
Base of Support: Narrowed; Center of gravity altered Speed/Divya: Pace decreased (<100 feet/min); Shuffled Step Length: Left shortened;Right shortened Gait Abnormalities: Decreased step clearance;Shuffling gait; Path deviations Distance (ft): 35 Feet (ft) (8' into the bathroom) Assistive Device: Walker, rolling Ambulation - Level of Assistance: Minimal assistance Interventions: Safety awareness training;Verbal cues;Manual cues Body Structures Involved: 1. Muscles Body Functions Affected: 1. Movement Related Activities and Participation Affected: 1. General Tasks and Demands 2. Mobility 3. Self Care Number of elements that affect the Plan of Care: 4+: HIGH COMPLEXITY CLINICAL PRESENTATION:  
Presentation: Evolving clinical presentation with changing clinical characteristics: MODERATE COMPLEXITY CLINICAL DECISION [address] [place] [id number] AM-PAC 6 Clicks Basic Mobility Inpatient Short Form How much difficulty does the patient currently have. .. Unable A Lot A Little None 1. Turning over in bed (including adjusting bedclothes, sheets and blankets)? [] 1   [] 2   [x] 3   [] 4  
2. Sitting down on and standing up from a chair with arms ( e.g., wheelchair, bedside commode, etc.)   [] 1   [] 2   [x] 3   [] 4  
3. Moving from lying on back to sitting on the side of the bed? [] 1   [] 2   [x] 3   [] 4 How much help from another person does the patient currently need. .. Total A Lot A Little None 4. Moving to and from a bed to a chair (including a wheelchair)? [] 1   [] 2   [x] 3   [] 4  
5. Need to walk in hospital room? [] 1   [] 2   [x] 3   [] 4  
6. Climbing 3-5 steps with a railing? [] 1   [x] 2   [] 3   [] 4  
© , Trustees of [address] [place] [id number], under license to [employer]. All rights reserved Score:  Initial: 17 Most Recent: X (Date: -- ) Interpretation of Tool:  Represents activities that are increasingly more difficult (i.e. Bed mobility, Transfers, Gait). Score 24 23 22-20 19-15 14-10 9-7 6 Modifier CH CI CJ CK CL CM CN   
 
? Mobility - Walking and Moving Around:  
  - CURRENT STATUS: CK - 40%-59% impaired, limited or restricted  - GOAL STATUS: CJ - 20%-39% impaired, limited or restricted  - D/C STATUS:  ---------------To be determined--------------- [place] / [place] PART A AND B / Product Type: Medicare /   
 
Medical Necessity:    
· Patient is expected to demonstrate progress in strength, range of motion, balance and coordination to decrease assistance required with overall functional mobility, transfers, ambulation. · Patient demonstrates good rehab potential due to higher previous functional level. Reason for Services/Other Comments: 
· Patient continues to require present interventions due to patient's inability to perform bed mobility, transfers, ambulation safely and effectively. Use of outcome tool(s) and clinical judgement create a POC that gives a: Questionable prediction of patient's progress: MODERATE COMPLEXITY  
  
 
 
 
TREATMENT:  
  
Pre-treatment Symptoms/Complaints:  No complaints Pain: Initial:  
Pain Intensity 1: 0  Post Session:  0/10 post session in supine Therapeutic Activity: (    25 minutes): Therapeutic activities including Bed transfers, toilet transfers, Chair transfers, Ambulation on level ground and weight shifting, walker safety to improve mobility, strength, balance and coordination. Required minimal Safety awareness training;Verbal cues;Manual cues to promote static and dynamic balance in standing and promote coordination of bilateral, lower extremity(s). Braces/Orthotics/Lines/Etc:  
· IV 
· O2 Device: Room air Treatment/Session Assessment: · Response to Treatment:  Fair tolerance, fatigued quickly · Interdisciplinary Collaboration:  
o Physical Therapy Assistant 
o Registered Nurse 
o Certified Nursing Assistant/Patient Care Technician · After treatment position/precautions:  
o Up in chair 
o Bed/Chair-wheels locked 
o Bed in low position 
o Call light within reach 
o RN notified 
o Family at bedside 
o PCT at bedside to assist with bath · Compliance with Program/Exercises: Will assess as treatment progresses. · Recommendations/Intent for next treatment session: \"Next visit will focus on advancements to more challenging activities\". Total Treatment Duration: PT Patient Time In/Time Out Time In: 3120 Time Out: 1016 Wayne Brochure, PTA
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
## 2018-09-13 NOTE — PROGRESS NOTES
Pt was confused and trying to get out of bed. RN responded to bed alarm to find pt hanging over bed with leg caught in rail. It caused a skin tear. Bandage applied.

## 2018-09-14 NOTE — PROGRESS NOTES
Pt to DC today to [place]. Room and report line given to RN. Pt and pt's sister notified.   Hailee Davison to transport 2:15pm.

## 2018-09-14 NOTE — PROGRESS NOTES
[facility] OUTREACH NURSE PROGRESS REPORT SUBJECTIVE: Called to assess patient secondary to Outreach Protocol. MEWS Score: 1 (09/13/18 2232) Vitals:  
 09/13/18 1611 09/13/18 2232 09/14/18 4135 09/14/18 5617 BP: 123/56 119/55 122/58 115/87 Pulse: 61 65 63 69 Resp: 20 19 19 20 Temp: 97.8 °F (36.6 °C) 97.9 °F (36.6 °C) 97.7 °F (36.5 °C) 97.7 °F (36.5 °C) SpO2: 95% 96% 97% 99% Weight:   46.9 kg (103 lb 6.3 oz) EKG: normal EKG, normal sinus rhythm, HR in 70s. OBJECTIVE: On arrival to room, I found patient to be sitting up in chair with daughter in room. Pain Assessment Pain Intensity 1: 0 (09/14/18 1100) Patient Stated Pain Goal: 0 
 
 
ASSESSMENT:  Patient sitting up in chair pleasantly confused. Patient eating lunch and denies any needs at this time. Spoke to Primary RN about UC results, MD to see patient. PLAN:  To follow up per Outreach Protocol.

## 2018-09-14 NOTE — PROGRESS NOTES
Problem: Self Care Deficits Care Plan (Adult) Goal: *Acute Goals and Plan of Care (Insert Text) 1. Pt will toilet with SBA 2. Pt will complete functional mobility for ADLs with SBA 3. Pt will complete lower body dressing with SBA using AE as needed 4. Pt will complete grooming and hygiene at sink with SBA 5. Pt will demonstrate improved cognition to complete functional tasks with min or fewer cues Timeframe: 7 days OCCUPATIONAL THERAPY: Daily Note, Treatment Day: 1st and AM 9/14/2018 INPATIENT: Hospital Day: 4 [place] / Product Type: Medicare /  
  
NAME/AGE/GENDER: Ophelia Padron is a 80 y.o. female PRIMARY DIAGNOSIS:  Subdural hematoma (HCC) Subdural hematoma (HCC) Subdural hematoma (HCC) ICD-10: Treatment Diagnosis:  
 · History of falling (Z91.81) Precautions/Allergies: 
  falls Celebrex [celecoxib] and Other medication ASSESSMENT:  
 
Ms. Santosh Phipps was admitted with SDH d/t falling at home. Pt lives with her daughter who provides 24 hr assistance. Pt completed bed mobility and functional transfer with the assistance listed below. Pt completed grooming while sitting edge of bed. Pt is progressing towards goals. Continue POC. This section established at most recent assessment PROBLEM LIST (Impairments causing functional limitations): 1. Decreased ADL/Functional Activities 2. Decreased Transfer Abilities 3. Decreased Balance 4. Decreased Cognition INTERVENTIONS PLANNED: (Benefits and precautions of occupational therapy have been discussed with the patient.) 1. Activities of daily living training 2. Adaptive equipment training 3. Balance training 4. Cognitive training 5. Therapeutic activity 6. Therapeutic exercise TREATMENT PLAN: Frequency/Duration: Follow patient 3 times/ week to address above goals. Rehabilitation Potential For Stated Goals: Fair RECOMMENDED REHABILITATION/EQUIPMENT: (at time of discharge pending progress): Due to the probability of continued deficits (see above) this patient will likely need continued skilled occupational therapy after discharge. Equipment:  
? None at this time OCCUPATIONAL PROFILE AND HISTORY:  
History of Present Injury/Illness (Reason for Referral): 
See H&P Past Medical History/Comorbidities:  
Ms. Praveen Recinos  has a past medical history of Anemia of other chronic disease (4/26/2014); Anxiety; Arrhythmia; CAD (coronary artery disease) (20O7); Chronic systolic heart failure ([facility] Coagulation disorder ([facility] Congestive heart failure ([facility] (08/2007); Contusion of chest wall (4/29/2012); Coronary artery disease involving autologous vein coronary bypass graft without angina pectoris (6/5/2015); Falls (4/23/2014); Generalized OA (6/5/2015); Heart failure ([facility] Hip pain (4/26/2014); History of skin cancer (1992); [place] (hard of hearing); Hygroma (4/23/2014); Hyperglycemia (4/29/2012); Hyperlipidemia; Hypertension; Hypotension (4/23/2014); Mild dementia (12/3/2014); Nausea & vomiting; Neutrophilic leukocytosis (8/14/9045); Osteoarthritis; Osteoporosis; Osteoporosis (12/3/2014); Postmenopausal (12/3/2014); Postmenopausal bone loss; S/P cardiac pacemaker procedure (1/2011); S/P implantation of automatic cardioverter/defibrillator (AICD) (4/29/2012); S/P placement of cardiac pacemaker (4/29/2012); Shoulder fracture (1991); and Syncope (4/29/2012). Ms. Praveen Recinos  has a past surgical history that includes pr biopsy of skin lesion (1992); hx bladder suspension (1995?); hx juan and bso (1984); hx tonsillectomy (as a child); hx heart catheterization (2007); hx shoulder arthroscopy (1991); hx pacemaker (1/13/2011); and hx ptca (08/2007). Social History/Living Environment:  
Home Environment: Private residence # Steps to Enter: 0 One/Two Story Residence: One story Living Alone: No 
 Support Systems: Family member(s), Child(ian) Patient Expects to be Discharged to[de-identified] Private residence Current DME Used/Available at Home: Grab bars, Safety frame toliet, Shower chair, Walker, rolling, Wheelchair Tub or Shower Type: Shower Prior Level of Function/Work/Activity: 
Pt lives with her daughter who provides 24 hr assistance and reports that she was independent with dressing, toileting, and grooming and required assistance with bathing. Pt uses a RW for mobility, stated that she has a walk in shower with grab bars and a shower chair. Pt reports that her grandson is the one who assists her with bathing. Number of Personal Factors/Comorbidities that affect the Plan of Care: Expanded review of therapy/medical records (1-2):  MODERATE COMPLEXITY ASSESSMENT OF OCCUPATIONAL PERFORMANCE[de-identified]  
Activities of Daily Living:  
Basic ADLs (From Assessment) Complex ADLs (From Assessment) Feeding: Setup Oral Facial Hygiene/Grooming: Minimum assistance Bathing: Moderate assistance Upper Body Dressing: Minimum assistance Lower Body Dressing: Minimum assistance Toileting: Minimum assistance Instrumental ADL Meal Preparation: Maximum assistance Homemaking: Maximum assistance Medication Management: Maximum assistance Financial Management: Maximum assistance Grooming/Bathing/Dressing Activities of Daily Living Grooming Brushing/Combing Hair: Minimum assistance Cognitive Retraining Safety/Judgement: Fall prevention Bed/Mat Mobility Supine to Sit: Minimum assistance Sit to Stand: Minimum assistance Bed to Chair: Minimum assistance Most Recent Physical Functioning:  
Gross Assessment: 
  
         
  
Posture: 
Posture (WDL): Exceptions to [facility] Posture Assessment: Forward head, Rounded shoulders (leaning to R) Balance: 
Sitting: Intact Standing: With support;Pull to stand Bed Mobility: 
Supine to Sit: Minimum assistance Wheelchair Mobility: 
  
Transfers: Sit to Stand: Minimum assistance Bed to Chair: Minimum assistance Patient Vitals for the past 6 hrs: 
 BP BP Patient Position SpO2 Pulse 18 0553 122/58 - 97 % 63  
18 0808 115/87 Head of bed elevated (Comment degrees) 99 % 69 Mental Status Neurologic State: Alert Orientation Level: Oriented to person Cognition: Follows commands Perception: Verbal, Tactile Perseveration: Verbal cues provided, Tactile cues provided Safety/Judgement: Fall prevention Physical Skills Involved: 
1. Balance 2. Strength 3. Vision Cognitive Skills Affected (resulting in the inability to perform in a timely and safe manner): 1. Perception 2. Executive Function 3. Short Term Recall 4. Long Term Memory 5. Sustained Attention 6. Divided Attention 7. Comprehension Psychosocial Skills Affected: 1. Habits/Routines 2. Environmental Adaptation Number of elements that affect the Plan of Care: 5+:  HIGH COMPLEXITY CLINICAL DECISION MAKIN[address] [place] [id number] AM-PAC 6 Clicks Daily Activity Inpatient Short Form How much help from another person does the patient currently need. .. Total A Lot A Little None 1. Putting on and taking off regular lower body clothing? [] 1   [] 2   [x] 3   [] 4  
2. Bathing (including washing, rinsing, drying)? [] 1   [x] 2   [] 3   [] 4  
3. Toileting, which includes using toilet, bedpan or urinal?   [] 1   [] 2   [x] 3   [] 4  
4. Putting on and taking off regular upper body clothing? [] 1   [] 2   [x] 3   [] 4  
5. Taking care of personal grooming such as brushing teeth? [] 1   [] 2   [x] 3   [] 4  
6. Eating meals? [] 1   [] 2   [] 3   [x] 4  
© , Trustees of [address] [place] [address], under license to [employer]. All rights reserved Score:  Initial: 18 Most Recent: X (Date: -- ) Interpretation of Tool:  Represents activities that are increasingly more difficult (i.e. Bed mobility, Transfers, Gait). Score 24 23 22-20 19-15 14-10 9-7 6 Modifier CH CI CJ CK CL CM CN   
 
? Self Care:  
  - CURRENT STATUS: CK - 40%-59% impaired, limited or restricted  - GOAL STATUS: CJ - 20%-39% impaired, limited or restricted  - D/C STATUS:  ---------------To be determined--------------- [place] / [place] B / Product Type: Medicare /   
 
Medical Necessity:    
· Patient is expected to demonstrate progress in balance and functional technique to increase independence with ADLs and improve safety during DLs. Reason for Services/Other Comments: 
· Patient continues to require present interventions due to patient's inability to safely and independently complete ADLs. Use of outcome tool(s) and clinical judgement create a POC that gives a: MODERATE COMPLEXITY  
 
 
 
TREATMENT:  
(In addition to Assessment/Re-Assessment sessions the following treatments were rendered) Pre-treatment Symptoms/Complaints:   
Pain: Initial:  
Pain Intensity 1: 0  Post Session:  0 Therapeutic Activity: (    15): Therapeutic activities including bed mobility and functional transfer to improve mobility and strength. Required min verbal and tactile cues   to promote motor control of bilateral, upper extremity(s), lower extremity(s). Braces/Orthotics/Lines/Etc:  
· IV 
· O2 Device: Room air Treatment/Session Assessment:   
· Response to Treatment:  No adverse reaction · Interdisciplinary Collaboration:  
o Certified Occupational Therapy Assistant 
o Registered Nurse · After treatment position/precautions:  
o Up in chair 
o Bed alarm/tab alert on 
o Bed/Chair-wheels locked 
o Call light within reach 
o RN notified · Compliance with Program/Exercises: Will assess as treatment progresses. · Recommendations/Intent for next treatment session: \"Next visit will focus on advancements to more challenging activities and reduction in assistance provided\". Total Treatment Duration: OT Patient Time In/Time Out Time In: 0007 Time Out: [address]

## 2018-09-14 NOTE — PROGRESS NOTES
Pt resting in bed during PM shift. Pt displays with confusion. PO Ativan given for restlessness. IV morphine given for pain to BLE. Pt is [place]. Encouraged pt to wear hearing aid. Pt reoriented to place and time and situation. Purewick catheter in place. Oriented patient to catheter. Pt continues to be somewhat confused and is called for \"Justine\". Informed patient family would most likely visit. Pt wanted to get out of bed, informed patient due to high fall risk she would need to stay in bed during PM shift. Will continue to monitor. VSS.
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
## 2018-09-14 NOTE — PROGRESS NOTES
Problem: Mobility Impaired (Adult and Pediatric) Goal: *Acute Goals and Plan of Care (Insert Text) LTG: 
(1.)Ms. Rosalinda Ferrari will move from supine to sit and sit to supine , scoot up and down and roll side to side with SUPERVISION within 7 treatment day(s) from flat surface without handrail. (2.)Ms. Rosalinda Ferrari will transfer from bed to chair and chair to bed with STAND BY ASSIST using the least restrictive device within 7 treatment day(s). (3.)Ms. Rosalinda Ferrari will ambulate with STAND BY ASSIST for 150+ feet with the least restrictive device within 7 treatment day(s), while maintaining normal vital signs. (4.)Ms. Rosalinda Ferrari will perform there ex in sitting or standing with SBA within 7 treatment days for increased strength and mobility.  
_____________________________________________________________________________________________ PHYSICAL THERAPY: Daily Note, Treatment Day: 2nd, AM 9/14/2018 INPATIENT: Hospital Day: 4 [place] / Product Type: Medicare /  
  
NAME/AGE/GENDER: Raya Soler is a 80 y.o. female PRIMARY DIAGNOSIS: Subdural hematoma (HCC) Subdural hematoma (HCC) Subdural hematoma (HCC) ICD-10: Treatment Diagnosis:  
 · Generalized Muscle Weakness (M62.81) · Difficulty in walking, Not elsewhere classified (R26.2) · History of falling (Z91.81) Precaution/Allergies: 
Celebrex [celecoxib] and Other medication ASSESSMENT:  
 
Ms. Rosalinda Ferrari presents sitting up in chair and very agreeable to therapy. Pt is very pleasant and cooperative. Pt stood and was able to ambulate about 80' using rolling walker and min assist.  Pt with decreased balance with initial standing as well as with ambulation. Pt returned to recliner, daughter arrived also. Pt rested a few minutes and then performed below LE exercises. Pt follows well with demonstration. Pt was left sitting up in chair, daughter present, needs in reach.  Pt is making good progress with ambulation and overall mobility. Will continue with POC. Pt would benefit from further skilled PT services at discharge. This section established at most recent assessment PROBLEM LIST (Impairments causing functional limitations): 1. Decreased Strength 2. Decreased ADL/Functional Activities 3. Decreased Transfer Abilities 4. Decreased Ambulation Ability/Technique 5. Decreased Balance 6. Decreased Activity Tolerance 7. Decreased [place] with Home Exercise Program 
8. Decreased Cognition INTERVENTIONS PLANNED: (Benefits and precautions of physical therapy have been discussed with the patient.) 1. Balance Exercise 2. Bed Mobility 3. Family Education 4. Gait Training 5. Home Exercise Program (HEP) 6. Therapeutic Activites 7. Therapeutic Exercise/Strengthening 8. Transfer Training 9. Group Therapy TREATMENT PLAN: Frequency/Duration: 3 times a week for duration of hospital stay Rehabilitation Potential For Stated Goals: Fair RECOMMENDED REHABILITATION/EQUIPMENT: (at time of discharge pending progress): Due to the probability of continued deficits (see above) this patient will likely need continued skilled physical therapy after discharge. Equipment:  
? to be determined HISTORY:  
History of Present Injury/Illness (Reason for Referral): This is a 80-year-old female patient who has a past medical history of systolic congestive heart failure with an ejection fraction of 30%, coronary artery disease status post ICD placement, who came into the emergency room after she had the mechanical fall and saw for a head injury. 
  
According to her family, the patient has been having difficulty walking around. This morning when she was getting out of bed and going to the bathroom, she lost her balance and fell down suffering an injury on the left side of her face. Her family says that she did not lose consciousness and they are sure it was just a mechanical fall.   She developed a hematoma around her left eye and in the left forehead. She has home services and by the time a visiting nurse and physical therapy arrived at home, they recommended her coming to the emergency room to be checked. When she arrived here, her vital signs were blood pressure of 140/70, heart rate of 60, respiratory rate of 18, O2 saturation of 98% at room air. She was awake and alert and she had a large left periorbital hematoma. Her initial blood workup reported normal white blood cell count, stable hemoglobin, stable creatinine level. A CT scan of the head was done and reported a focal density adjacent to the left frontal lobe concerning for a tiny acute subdural hematoma. A moderate sized forehead hematoma with subsequent edematous changes likely represented additional contusion overlying the left face. No acute osseous abnormality was reported. The case was discussed with Neurosurgery who recommended holding antiplatelet and anticoagulant medications and repeating a CT scan of the head in the morning. No acute neurosurgical intervention was considered necessary. The patient was presented to the hospitalist team to be admitted. 
  
Past Medical History/Comorbidities:  
Ms. Anatoliy Hernandez  has a past medical history of Anemia of other chronic disease (4/26/2014); Anxiety; Arrhythmia; CAD (coronary artery disease) (20O7); Chronic systolic heart failure ([address] Coagulation disorder ([address] Congestive heart failure ([address] (08/2007); Contusion of chest wall (4/29/2012); Coronary artery disease involving autologous vein coronary bypass graft without angina pectoris (6/5/2015); Falls (4/23/2014); Generalized OA (6/5/2015); Heart failure ([address] Hip pain (4/26/2014); History of skin cancer (1992); [place] (hard of hearing); Hygroma (4/23/2014); Hyperglycemia (4/29/2012); Hyperlipidemia; Hypertension; Hypotension (4/23/2014); Mild dementia (12/3/2014);  Nausea & vomiting; Neutrophilic leukocytosis (4/29/2012); Osteoarthritis; Osteoporosis; Osteoporosis (12/3/2014); Postmenopausal (12/3/2014); Postmenopausal bone loss; S/P cardiac pacemaker procedure (1/2011); S/P implantation of automatic cardioverter/defibrillator (AICD) (4/29/2012); S/P placement of cardiac pacemaker (4/29/2012); Shoulder fracture (1991); and Syncope (4/29/2012). Ms. Barbara Muir  has a past surgical history that includes pr biopsy of skin lesion (1992); hx bladder suspension (1995?); hx juan and bso (1984); hx tonsillectomy (as a child); hx heart catheterization (2007); hx shoulder arthroscopy (1991); hx pacemaker (1/13/2011); and hx ptca (08/2007). Social History/Living Environment:  
Home Environment: Private residence # Steps to Enter: 0 One/Two Story Residence: One story Living Alone: No 
Support Systems: Family member(s), Child(ian) Patient Expects to be Discharged to[de-identified] Private residence Current DME Used/Available at Home: Grab bars, Safety frame toliet, Shower chair, Walker, rolling, Wheelchair Tub or Shower Type: Shower Prior Level of Function/Work/Activity: 
Lives with daughter; uses RW for ambulation; set up for ADLs; w/c in community; frequent falls Personal Factors:   
      Sex:  female Age:  [address]o. Overall Behavior:  agreeable Number of Personal Factors/Comorbidities that affect the Plan of Care: 
Smoker, pacemaker, age, CHF 3+: HIGH COMPLEXITY EXAMINATION:  
Most Recent Physical Functioning:  
Gross Assessment: 
  
         
  
Posture: 
  
Balance: 
Sitting: Intact Sitting - Static: Good (unsupported) Sitting - Dynamic: Fair (occasional) Standing - Static: Fair Standing - Dynamic : Poor Bed Mobility: 
  
Wheelchair Mobility: 
  
Transfers: 
Sit to Stand: Minimum assistance; Additional time Stand to Sit: Minimum assistance Gait: 
  
Base of Support: Center of gravity altered;Narrowed Speed/Divya: Pace decreased (<100 feet/min); Shuffled Step Length: Left shortened;Right shortened Gait Abnormalities: Decreased step clearance;Shuffling gait; Path deviations Distance (ft): 80 Feet (ft) Assistive Device: Walker, rolling Ambulation - Level of Assistance: Minimal assistance Interventions: Safety awareness training; Tactile cues; Verbal cues Body Structures Involved: 1. Muscles Body Functions Affected: 1. Movement Related Activities and Participation Affected: 1. General Tasks and Demands 2. Mobility 3. Self Care Number of elements that affect the Plan of Care: 4+: HIGH COMPLEXITY CLINICAL PRESENTATION:  
Presentation: Evolving clinical presentation with changing clinical characteristics: MODERATE COMPLEXITY CLINICAL DECISION MAKIN[address] [place] [address] AM-PAC 6 Clicks Basic Mobility Inpatient Short Form How much difficulty does the patient currently have. .. Unable A Lot A Little None 1. Turning over in bed (including adjusting bedclothes, sheets and blankets)? [] 1   [] 2   [x] 3   [] 4  
2. Sitting down on and standing up from a chair with arms ( e.g., wheelchair, bedside commode, etc.)   [] 1   [] 2   [x] 3   [] 4  
3. Moving from lying on back to sitting on the side of the bed? [] 1   [] 2   [x] 3   [] 4 How much help from another person does the patient currently need. .. Total A Lot A Little None 4. Moving to and from a bed to a chair (including a wheelchair)? [] 1   [] 2   [x] 3   [] 4  
5. Need to walk in hospital room? [] 1   [] 2   [x] 3   [] 4  
6. Climbing 3-5 steps with a railing? [] 1   [x] 2   [] 3   [] 4  
© , Trustees of [address] [place] [id number], under license to [employer]. All rights reserved Score:  Initial: 17 Most Recent: X (Date: -- ) Interpretation of Tool:  Represents activities that are increasingly more difficult (i.e. Bed mobility, Transfers, Gait). Score 24 23 22-20 19-15 14-10 9-7 6 Modifier CH CI CJ CK CL CM CN   
 
? Mobility - Walking and Moving Around:  - CURRENT STATUS: CK - 40%-59% impaired, limited or restricted  - GOAL STATUS: CJ - 20%-39% impaired, limited or restricted  - D/C STATUS:  ---------------To be determined--------------- [place] / [place] A [place] B / Product Type: Medicare /   
 
Medical Necessity:    
· Patient is expected to demonstrate progress in strength, range of motion, balance and coordination to decrease assistance required with overall functional mobility, transfers, ambulation. · Patient demonstrates good rehab potential due to higher previous functional level. Reason for Services/Other Comments: 
· Patient continues to require present interventions due to patient's inability to perform bed mobility, transfers, ambulation safely and effectively. Use of outcome tool(s) and clinical judgement create a POC that gives a: Questionable prediction of patient's progress: MODERATE COMPLEXITY  
  
 
 
 
TREATMENT:  
  
Pre-treatment Symptoms/Complaints:  No complaints Pain: Initial:  
Pain Intensity 1: 0  Post Session:  0/10 post session in supine Therapeutic Activity: (    15 minutes): Therapeutic activities including Bed transfers, Chair transfers, Ambulation on level ground and weight shifting, walker safety to improve mobility, strength, balance and coordination. Required minimal Safety awareness training; Tactile cues; Verbal cues to promote static and dynamic balance in standing and promote coordination of bilateral, lower extremity(s). Therapeutic Exercise: (  8 minutes):  Exercises per grid below to improve strength and balance. Required minimal visual and verbal cues to promote proper body posture and promote proper body mechanics. Progressed range and repetitions as indicated. Date: 
9/14/18 Date: 
 Date: 
  
ACTIVITY/EXERCISE AM PM AM PM AM PM  
Ambulation:           Distance Device Duration Seated Heel Raises X 15 B Seated Toe Raises X 15 B       
 Seated Long Arc Quads X 15 B Seated Marching X 15 B Seated Hip Abduction X 15 B       
        
B = bilateral; AA = active assistive; A = active; P = passive Braces/Orthotics/Lines/Etc:  
· IV 
· O2 Device: Room air Treatment/Session Assessment:   
· Response to Treatment:  Fair tolerance, increased ambulation today · Interdisciplinary Collaboration:  
o Physical Therapy Assistant 
o Registered Nurse · After treatment position/precautions:  
o Up in chair 
o Bed/Chair-wheels locked 
o Bed in low position 
o Call light within reach 
o RN notified 
o Family at bedside · Compliance with Program/Exercises: Will assess as treatment progresses. · Recommendations/Intent for next treatment session: \"Next visit will focus on advancements to more challenging activities\". Total Treatment Duration: PT Patient Time In/Time Out Time In: 1001 Time Out: 1024 Neno Rosales, PTA

## 2018-09-14 NOTE — PROGRESS NOTES
TRANSFER - OUT REPORT: 
 
Verbal report given to Del Polanco RN (name) on Estefany Leung  being transferred to [facility](unit) for routine progression of care Report consisted of patients Situation, Background, Assessment and  
Recommendations(SBAR). Information from the following report(s) SBAR, Kardex and Intake/Output was reviewed with the receiving nurse. Lines:  
Peripheral IV 09/11/18 Right Antecubital (Active) Site Assessment Clean, dry, & intact 9/14/2018  5:03 AM  
Phlebitis Assessment 0 9/14/2018  5:03 AM  
Infiltration Assessment 0 9/14/2018  5:03 AM  
Dressing Status Clean, dry, & intact 9/14/2018  5:03 AM  
Dressing Type Tape;Transparent 9/14/2018  5:03 AM  
Hub Color/Line Status Flushed;Patent 9/13/2018  3:00 PM  
Alcohol Cap Used No 9/13/2018  3:00 PM  
  
 
Opportunity for questions and clarification was provided. Patient transported with: 
 Hearing aids, Glasses, and Dentures.

## 2018-09-14 NOTE — PROGRESS NOTES
Pt resting quietly in bed with  at bedside. Pt has lap sites to lower abdomen. Perez draining serosnaguoinous fluid. Pt c/o persistent pain. Pt given 0.5mg dilaudid IV. Pt is receiving IV APAP. IV ABT administered. Pt states she is passing gas as of this AM.  IV Toradol administered. Pt states relief. Pt inquiring as to when to get OOB to chair. Informed her MD and PT would evaluate.  is present and assisting patient. Pt did have a lower bP during PM shiftl. With 90/55. VSS at this time. No further complaints. Will continue to monitor/assess.

## 2018-09-14 NOTE — PROGRESS NOTES
In accordance with Medicare Guidelines, a copy of the Important Letter from Medicare was presented to the patient in anticipation of expected discharge within 48 hours. An oral explanation was provided and all questions were answered. A signed copy of the Important Letter from Medicare was placed in the patient's medical chart, and a copy of the Important Letter from Medicare was provided to the patient. Care Managers were notified.

## 2018-09-14 NOTE — PROGRESS NOTES
Hourly rounds completed throughout this shift. Pt had 1 BM this shift. Family in room. Bed alarm turned on. Pt offered pain medicine this shift but pt refused. Skin tear dressing on right sheen C/D/I. Pt resting in bed; denies needs at this time. Will continue to monitor and report to oncoming night shift nurse.

## 2018-10-08 NOTE — PROGRESS NOTES
This note will not be viewable in [address] Transition of Care Discharge Follow-up Questionnaire Date/Time of Call: 
 10/8/18 3:39pm  
What was the patient hospitalized for? Subdural hematoma, d/c to [place] 9/14/18; d/c home 10/5/18 Does the patient understand his/her diagnosis and/or treatment and what happened during the hospitalization? Yes, spoke with patients daughter, Mitzi Moss, she states her understanding of diagnosis and treatment; and is agreeable to call. Shruti states patient is doing well, I was tired of that place, they were bugging me so I just brought her home Did the patient receive discharge instructions? Yes   
CM Assessed Risk for Readmission:  
 
 
Patient stated Risk for Readmission: Low r/t diagnosis Unable to verify Review any discharge instructions (see discharge instructions/AVS in [facility]). Ask patient if they understand these. Do they have any questions? Reviewed, understanding is stated, no questions at this time Were home services ordered (nursing, PT, OT, ST, etc.)? Shruti indicates Miko Aldana were supposed to fax all that stuff to [facility]. Verified with [facility] Intake no orders/documentation received from the [place] If so, has the first visit occurred? If not, why? (Assist with coordination of services if necessary.) No  
Was any DME ordered? No  
  
If so, has it been received? If not, why?  (Assist patient in obtaining DME orders &/or equipment if necessary.) N/A Complete a review of all medications (new, continued and discontinued meds per the D/C instructions and medication tab in [facility]). Completed to this writers ability Were all new prescriptions filled? If not, why?  (Assist patient in obtaining medications if necessary  escalate for CCM &/or SW if ongoing issues are verbalized by pt or anticipated) Yes, all meds in home per daughter Does the patient understand the purpose and dosing instructions for all medications? (If patient has questions, provide explanation and education.) Yes, understanding of medications Does the patient have any problems in performing ADLs? (If patient is unable to perform ADLs  what is the limiting factor(s)? Do they have a support system that can assist? If no support system is present, discuss possible assistance that they may be able to obtain. Escalate for CCM/SW if ongoing issues are verbalized by pt or anticipated) Requires assistance with ADLs Does the patient have all follow-up appointments scheduled? 7 day f/up with PCP?  
(f/up with PCP may be w/in 14 days if patient has a f/up with their specialist w/in 7 days) 7-14 day f/up with specialist?  
(or per discharge instructions) If f/up has not been made  what actions has the care coordinator made to accomplish this? Has transportation been arranged? Per [facility] patient has following appointment, sukh was unaware: 
 
Charmayne Lemming, MD 12/6/18 3:15pm 
 
Dr. Nitin [facility] spine and Neurosurgery- 10/10/18 11:30 Dr. Destiney Campos, [address] 10/18/18 11:15am 
 
Device check UCDG 10/29/18 CT HEAD 11/5/18 Yes, no transportation issues Any other questions or concerns expressed by the patient? No further questions or concerns at this time. Sukh states her gratitude for follow up Contact information for [facility] was given, instructed to call with questions or concerns. Schedule next appointment with JAMES MARTINEZ Coordinator or refer to RN Case Manager/ per the workflow guidelines. When is care coordinators next follow-up call scheduled? If referred for CCM  what RN care manager was the referral assigned? Referral to RN CCM for educational needs of caregiver Information sent to Tiffany Daniel RN CCM 
 
 
 
  
JEANNINE Call Completed By: Elvira Ornelas LPN 
 Community Care Coordinator

## 2018-10-08 NOTE — Clinical Note
Patient d/c home from the [facility] 10/5/18. Patient may have been brought home ama, daughter Gillian Thomas states \"I was tired of that place, they were bugging me so I just brought her home\". Daughter was unaware of appointments and only states med were in the home. Educational needs.

## 2018-10-10 NOTE — PROGRESS NOTES
This note will not be viewable in [address] RNCM spoke w/ pt's daughter Shruti regarding CCM services. Pt has children that stay w/ her 24/7. She has a wheelchair, walker, BSC, walk in shower. Pt is ambulatory w/ walker, however daughter says she needs assistance 24/7 d/t frequent falls and poor recall about using walker. Pt has [facility] PT, OT, and ST. RNCM reviewed medications w/ Shruti who reports she manages pt's medications using pill planner. RNCM reviewed falls precautions w/ Shruti who verbalized understanding. RNCM spoke w/ pt's PT Vicki Brewster, who reports pt has 2 open sores on chin areas and requests RN to come into the home. Discussed upcoming appts: family to transport. Sharlot Dubin, MD 10/16/18  10:15 
  
Dr. Leda Watts, [facility]- 10/15/18 11:30 
  
Dr. Sue Azevedo, [address] 10/18/18 11:15am 
  
Device check UCDG 10/29/18 
  
CT HEAD 11/5/18 Next Steps: RNCM to request order for RN to be added to [facility]. RNCM scheduled f/u in 1wk for med educ.

## 2018-10-22 NOTE — PROGRESS NOTES
This note will not be viewable in [address] RNCM spoke w/ pt's daughter Sheryle Stanford who reports pt fell and hit her head, daughter requests; call ahead since physician appts. Discussed falls precautions w/ daughter, as well as s/s UTI. Daughter states pt sometimes hard to tell if pt having symptoms. Discussed cleasing well after BMs, and encouraging pt to empty bladder completely. Daughter verbalizes understanding. [facility] continues w/ PT, OT, and anticipating RN Eval.  
 
Discussed upcoming appts: 10/23/2018 9:00 AM Peter Next Steps: RNCM scheduled f/u in 1wk.

## 2018-11-06 NOTE — PROGRESS NOTES
This note will not be viewable in [address] S:Pt dc'd 9/14/18 from [facility] s/p fall and UTI. RNCM spoke w/ pt's daughter Shruti regarding f/u [facility] services. Shruti reports pt is using walker, denies falls. She is continuing to receive [facility] services. Shruti plans to keep pt in home for as long as possible, until pt is need of penitentiary. Per Shruti, her name is listed on pt's home first then pt's name. Discussed finances for LEIF, per Shruti pt's  served in [facility] would help cover expenses. Next Steps: Carmian Meneses will graduate pt at this time, daughter retains RNCM contact information for future questions or concerns.

## 2019-01-01 ENCOUNTER — HOSPITAL ENCOUNTER (INPATIENT)
Age: 84
LOS: 10 days | Discharge: HOSPICE | DRG: 177 | End: 2019-05-23
Attending: EMERGENCY MEDICINE | Admitting: FAMILY MEDICINE
Payer: MEDICARE

## 2019-01-01 ENCOUNTER — HOSPICE ADMISSION (OUTPATIENT)
Dept: HOSPICE | Facility: HOSPICE | Age: 84
End: 2019-01-01
Payer: MEDICARE

## 2019-01-01 ENCOUNTER — APPOINTMENT (OUTPATIENT)
Dept: GENERAL RADIOLOGY | Age: 84
DRG: 177 | End: 2019-01-01
Attending: INTERNAL MEDICINE
Payer: MEDICARE

## 2019-01-01 ENCOUNTER — HOSPITAL ENCOUNTER (INPATIENT)
Age: 84
LOS: 2 days | End: 2019-05-25
Attending: INTERNAL MEDICINE | Admitting: INTERNAL MEDICINE

## 2019-01-01 ENCOUNTER — APPOINTMENT (OUTPATIENT)
Dept: GENERAL RADIOLOGY | Age: 84
DRG: 177 | End: 2019-01-01
Attending: EMERGENCY MEDICINE
Payer: MEDICARE

## 2019-01-01 VITALS
WEIGHT: 107.58 LBS | RESPIRATION RATE: 17 BRPM | SYSTOLIC BLOOD PRESSURE: 119 MMHG | TEMPERATURE: 97.9 F | OXYGEN SATURATION: 93 % | BODY MASS INDEX: 21.12 KG/M2 | DIASTOLIC BLOOD PRESSURE: 56 MMHG | HEIGHT: 60 IN | HEART RATE: 93 BPM

## 2019-01-01 VITALS
HEART RATE: 86 BPM | TEMPERATURE: 100.3 F | SYSTOLIC BLOOD PRESSURE: 117 MMHG | RESPIRATION RATE: 20 BRPM | DIASTOLIC BLOOD PRESSURE: 46 MMHG

## 2019-01-01 DIAGNOSIS — F03.A0 MILD DEMENTIA: ICD-10-CM

## 2019-01-01 DIAGNOSIS — Z51.5 ENCOUNTER FOR PALLIATIVE CARE: ICD-10-CM

## 2019-01-01 DIAGNOSIS — J69.0 ASPIRATION PNEUMONIA OF LEFT LOWER LOBE DUE TO VOMIT (HCC): ICD-10-CM

## 2019-01-01 DIAGNOSIS — J18.9 PNEUMONIA OF RIGHT UPPER LOBE DUE TO INFECTIOUS ORGANISM: Primary | ICD-10-CM

## 2019-01-01 DIAGNOSIS — I50.22 CHRONIC SYSTOLIC HEART FAILURE (HCC): Chronic | ICD-10-CM

## 2019-01-01 DIAGNOSIS — R53.81 DEBILITY: ICD-10-CM

## 2019-01-01 LAB
ALBUMIN SERPL-MCNC: 2.4 G/DL (ref 3.2–4.6)
ALBUMIN/GLOB SERPL: 0.5 {RATIO} (ref 1.2–3.5)
ALP SERPL-CCNC: 131 U/L (ref 50–136)
ALT SERPL-CCNC: 18 U/L (ref 12–65)
ANION GAP SERPL CALC-SCNC: 10 MMOL/L (ref 7–16)
ANION GAP SERPL CALC-SCNC: 11 MMOL/L (ref 7–16)
ANION GAP SERPL CALC-SCNC: 6 MMOL/L (ref 7–16)
ANION GAP SERPL CALC-SCNC: 7 MMOL/L (ref 7–16)
ANION GAP SERPL CALC-SCNC: 8 MMOL/L (ref 7–16)
ANION GAP SERPL CALC-SCNC: 8 MMOL/L (ref 7–16)
ANION GAP SERPL CALC-SCNC: 9 MMOL/L (ref 7–16)
ANION GAP SERPL CALC-SCNC: 9 MMOL/L (ref 7–16)
APPEARANCE UR: ABNORMAL
AST SERPL-CCNC: 30 U/L (ref 15–37)
ATRIAL RATE: 65 BPM
BACTERIA SPEC CULT: NORMAL
BACTERIA URNS QL MICRO: 0 /HPF
BASOPHILS # BLD: 0 K/UL (ref 0–0.2)
BASOPHILS NFR BLD: 0 % (ref 0–2)
BILIRUB SERPL-MCNC: 0.5 MG/DL (ref 0.2–1.1)
BILIRUB UR QL: NEGATIVE
BNP SERPL-MCNC: 627 PG/ML
BUN SERPL-MCNC: 19 MG/DL (ref 8–23)
BUN SERPL-MCNC: 20 MG/DL (ref 8–23)
BUN SERPL-MCNC: 23 MG/DL (ref 8–23)
BUN SERPL-MCNC: 24 MG/DL (ref 8–23)
BUN SERPL-MCNC: 24 MG/DL (ref 8–23)
BUN SERPL-MCNC: 25 MG/DL (ref 8–23)
BUN SERPL-MCNC: 27 MG/DL (ref 8–23)
BUN SERPL-MCNC: 31 MG/DL (ref 8–23)
CALCIUM SERPL-MCNC: 8.9 MG/DL (ref 8.3–10.4)
CALCIUM SERPL-MCNC: 9.2 MG/DL (ref 8.3–10.4)
CALCIUM SERPL-MCNC: 9.2 MG/DL (ref 8.3–10.4)
CALCIUM SERPL-MCNC: 9.3 MG/DL (ref 8.3–10.4)
CALCIUM SERPL-MCNC: 9.3 MG/DL (ref 8.3–10.4)
CALCIUM SERPL-MCNC: 9.5 MG/DL (ref 8.3–10.4)
CALCULATED P AXIS, ECG09: 74 DEGREES
CALCULATED R AXIS, ECG10: -38 DEGREES
CALCULATED T AXIS, ECG11: 54 DEGREES
CASTS URNS QL MICRO: ABNORMAL /LPF
CHLORIDE SERPL-SCNC: 102 MMOL/L (ref 98–107)
CHLORIDE SERPL-SCNC: 104 MMOL/L (ref 98–107)
CHLORIDE SERPL-SCNC: 107 MMOL/L (ref 98–107)
CHLORIDE SERPL-SCNC: 109 MMOL/L (ref 98–107)
CHLORIDE SERPL-SCNC: 110 MMOL/L (ref 98–107)
CHLORIDE SERPL-SCNC: 110 MMOL/L (ref 98–107)
CHLORIDE SERPL-SCNC: 111 MMOL/L (ref 98–107)
CHLORIDE SERPL-SCNC: 95 MMOL/L (ref 98–107)
CO2 SERPL-SCNC: 23 MMOL/L (ref 21–32)
CO2 SERPL-SCNC: 23 MMOL/L (ref 21–32)
CO2 SERPL-SCNC: 25 MMOL/L (ref 21–32)
CO2 SERPL-SCNC: 26 MMOL/L (ref 21–32)
CO2 SERPL-SCNC: 26 MMOL/L (ref 21–32)
CO2 SERPL-SCNC: 30 MMOL/L (ref 21–32)
COLOR UR: YELLOW
CREAT SERPL-MCNC: 0.96 MG/DL (ref 0.6–1)
CREAT SERPL-MCNC: 0.98 MG/DL (ref 0.6–1)
CREAT SERPL-MCNC: 1.05 MG/DL (ref 0.6–1)
CREAT SERPL-MCNC: 1.08 MG/DL (ref 0.6–1)
CREAT SERPL-MCNC: 1.08 MG/DL (ref 0.6–1)
CREAT SERPL-MCNC: 1.14 MG/DL (ref 0.6–1)
CREAT SERPL-MCNC: 1.15 MG/DL (ref 0.6–1)
CREAT SERPL-MCNC: 1.18 MG/DL (ref 0.6–1)
DIAGNOSIS, 93000: NORMAL
DIFFERENTIAL METHOD BLD: ABNORMAL
EOSINOPHIL # BLD: 0.1 K/UL (ref 0–0.8)
EOSINOPHIL # BLD: 0.1 K/UL (ref 0–0.8)
EOSINOPHIL # BLD: 0.2 K/UL (ref 0–0.8)
EOSINOPHIL NFR BLD: 1 % (ref 0.5–7.8)
EOSINOPHIL NFR BLD: 2 % (ref 0.5–7.8)
EOSINOPHIL NFR BLD: 2 % (ref 0.5–7.8)
EPI CELLS #/AREA URNS HPF: ABNORMAL /HPF
ERYTHROCYTE [DISTWIDTH] IN BLOOD BY AUTOMATED COUNT: 14.5 % (ref 11.9–14.6)
ERYTHROCYTE [DISTWIDTH] IN BLOOD BY AUTOMATED COUNT: 14.6 % (ref 11.9–14.6)
ERYTHROCYTE [DISTWIDTH] IN BLOOD BY AUTOMATED COUNT: 14.6 % (ref 11.9–14.6)
ERYTHROCYTE [DISTWIDTH] IN BLOOD BY AUTOMATED COUNT: 14.8 % (ref 11.9–14.6)
ERYTHROCYTE [DISTWIDTH] IN BLOOD BY AUTOMATED COUNT: 14.8 % (ref 11.9–14.6)
ERYTHROCYTE [DISTWIDTH] IN BLOOD BY AUTOMATED COUNT: 15 % (ref 11.9–14.6)
ERYTHROCYTE [DISTWIDTH] IN BLOOD BY AUTOMATED COUNT: 15 % (ref 11.9–14.6)
ERYTHROCYTE [DISTWIDTH] IN BLOOD BY AUTOMATED COUNT: 15.1 % (ref 11.9–14.6)
GLOBULIN SER CALC-MCNC: 4.5 G/DL (ref 2.3–3.5)
GLUCOSE SERPL-MCNC: 103 MG/DL (ref 65–100)
GLUCOSE SERPL-MCNC: 104 MG/DL (ref 65–100)
GLUCOSE SERPL-MCNC: 108 MG/DL (ref 65–100)
GLUCOSE SERPL-MCNC: 129 MG/DL (ref 65–100)
GLUCOSE SERPL-MCNC: 131 MG/DL (ref 65–100)
GLUCOSE SERPL-MCNC: 71 MG/DL (ref 65–100)
GLUCOSE SERPL-MCNC: 83 MG/DL (ref 65–100)
GLUCOSE SERPL-MCNC: 95 MG/DL (ref 65–100)
GLUCOSE UR STRIP.AUTO-MCNC: NEGATIVE MG/DL
HCT VFR BLD AUTO: 26.6 % (ref 35.8–46.3)
HCT VFR BLD AUTO: 27.8 % (ref 35.8–46.3)
HCT VFR BLD AUTO: 28 % (ref 35.8–46.3)
HCT VFR BLD AUTO: 29.2 % (ref 35.8–46.3)
HCT VFR BLD AUTO: 30 % (ref 35.8–46.3)
HCT VFR BLD AUTO: 30.4 % (ref 35.8–46.3)
HCT VFR BLD AUTO: 31.9 % (ref 35.8–46.3)
HCT VFR BLD AUTO: 36.1 % (ref 35.8–46.3)
HGB BLD-MCNC: 11.1 G/DL (ref 11.7–15.4)
HGB BLD-MCNC: 8.2 G/DL (ref 11.7–15.4)
HGB BLD-MCNC: 8.6 G/DL (ref 11.7–15.4)
HGB BLD-MCNC: 8.9 G/DL (ref 11.7–15.4)
HGB BLD-MCNC: 9.2 G/DL (ref 11.7–15.4)
HGB BLD-MCNC: 9.3 G/DL (ref 11.7–15.4)
HGB BLD-MCNC: 9.5 G/DL (ref 11.7–15.4)
HGB BLD-MCNC: 9.8 G/DL (ref 11.7–15.4)
HGB UR QL STRIP: ABNORMAL
IMM GRANULOCYTES # BLD AUTO: 0.1 K/UL (ref 0–0.5)
IMM GRANULOCYTES NFR BLD AUTO: 1 % (ref 0–5)
KETONES UR QL STRIP.AUTO: NEGATIVE MG/DL
LACTATE BLD-SCNC: 1.59 MMOL/L (ref 0.5–1.9)
LEUKOCYTE ESTERASE UR QL STRIP.AUTO: NEGATIVE
LYMPHOCYTES # BLD: 1.1 K/UL (ref 0.5–4.6)
LYMPHOCYTES # BLD: 1.2 K/UL (ref 0.5–4.6)
LYMPHOCYTES # BLD: 1.2 K/UL (ref 0.5–4.6)
LYMPHOCYTES # BLD: 1.3 K/UL (ref 0.5–4.6)
LYMPHOCYTES # BLD: 1.3 K/UL (ref 0.5–4.6)
LYMPHOCYTES NFR BLD: 10 % (ref 13–44)
LYMPHOCYTES NFR BLD: 12 % (ref 13–44)
LYMPHOCYTES NFR BLD: 16 % (ref 13–44)
LYMPHOCYTES NFR BLD: 9 % (ref 13–44)
LYMPHOCYTES NFR BLD: 9 % (ref 13–44)
MAGNESIUM SERPL-MCNC: 2.2 MG/DL (ref 1.8–2.4)
MCH RBC QN AUTO: 28.2 PG (ref 26.1–32.9)
MCH RBC QN AUTO: 28.3 PG (ref 26.1–32.9)
MCH RBC QN AUTO: 28.4 PG (ref 26.1–32.9)
MCH RBC QN AUTO: 28.7 PG (ref 26.1–32.9)
MCH RBC QN AUTO: 28.8 PG (ref 26.1–32.9)
MCH RBC QN AUTO: 28.8 PG (ref 26.1–32.9)
MCH RBC QN AUTO: 28.9 PG (ref 26.1–32.9)
MCH RBC QN AUTO: 28.9 PG (ref 26.1–32.9)
MCHC RBC AUTO-ENTMCNC: 30.7 G/DL (ref 31.4–35)
MCHC RBC AUTO-ENTMCNC: 30.8 G/DL (ref 31.4–35)
MCHC RBC AUTO-ENTMCNC: 30.9 G/DL (ref 31.4–35)
MCHC RBC AUTO-ENTMCNC: 31.3 G/DL (ref 31.4–35)
MCHC RBC AUTO-ENTMCNC: 31.8 G/DL (ref 31.4–35)
MCHC RBC AUTO-ENTMCNC: 31.8 G/DL (ref 31.4–35)
MCV RBC AUTO: 88.6 FL (ref 79.6–97.8)
MCV RBC AUTO: 90.7 FL (ref 79.6–97.8)
MCV RBC AUTO: 91.8 FL (ref 79.6–97.8)
MCV RBC AUTO: 92 FL (ref 79.6–97.8)
MCV RBC AUTO: 92.3 FL (ref 79.6–97.8)
MCV RBC AUTO: 93 FL (ref 79.6–97.8)
MCV RBC AUTO: 93.5 FL (ref 79.6–97.8)
MCV RBC AUTO: 94.1 FL (ref 79.6–97.8)
MM INDURATION POC: 0 MM (ref 0–5)
MONOCYTES # BLD: 0.8 K/UL (ref 0.1–1.3)
MONOCYTES # BLD: 1.1 K/UL (ref 0.1–1.3)
MONOCYTES # BLD: 1.2 K/UL (ref 0.1–1.3)
MONOCYTES # BLD: 1.4 K/UL (ref 0.1–1.3)
MONOCYTES # BLD: 1.5 K/UL (ref 0.1–1.3)
MONOCYTES NFR BLD: 11 % (ref 4–12)
MONOCYTES NFR BLD: 16 % (ref 4–12)
MONOCYTES NFR BLD: 7 % (ref 4–12)
NEUTS SEG # BLD: 10 K/UL (ref 1.7–8.2)
NEUTS SEG # BLD: 10 K/UL (ref 1.7–8.2)
NEUTS SEG # BLD: 10.6 K/UL (ref 1.7–8.2)
NEUTS SEG # BLD: 5.1 K/UL (ref 1.7–8.2)
NEUTS SEG # BLD: 8 K/UL (ref 1.7–8.2)
NEUTS SEG NFR BLD: 67 % (ref 43–78)
NEUTS SEG NFR BLD: 74 % (ref 43–78)
NEUTS SEG NFR BLD: 78 % (ref 43–78)
NEUTS SEG NFR BLD: 78 % (ref 43–78)
NEUTS SEG NFR BLD: 82 % (ref 43–78)
NITRITE UR QL STRIP.AUTO: NEGATIVE
NRBC # BLD: 0 K/UL (ref 0–0.2)
P-R INTERVAL, ECG05: 154 MS
PH UR STRIP: 5 [PH] (ref 5–9)
PLATELET # BLD AUTO: 197 K/UL (ref 150–450)
PLATELET # BLD AUTO: 208 K/UL (ref 150–450)
PLATELET # BLD AUTO: 212 K/UL (ref 150–450)
PLATELET # BLD AUTO: 223 K/UL (ref 150–450)
PLATELET # BLD AUTO: 224 K/UL (ref 150–450)
PLATELET # BLD AUTO: 247 K/UL (ref 150–450)
PLATELET # BLD AUTO: 260 K/UL (ref 150–450)
PLATELET # BLD AUTO: 279 K/UL (ref 150–450)
PMV BLD AUTO: 10 FL (ref 9.4–12.3)
PMV BLD AUTO: 10.1 FL (ref 9.4–12.3)
PMV BLD AUTO: 11 FL (ref 9.4–12.3)
PMV BLD AUTO: 9.8 FL (ref 9.4–12.3)
PMV BLD AUTO: 9.8 FL (ref 9.4–12.3)
PMV BLD AUTO: 9.9 FL (ref 9.4–12.3)
POTASSIUM SERPL-SCNC: 2.7 MMOL/L (ref 3.5–5.1)
POTASSIUM SERPL-SCNC: 3.2 MMOL/L (ref 3.5–5.1)
POTASSIUM SERPL-SCNC: 3.3 MMOL/L (ref 3.5–5.1)
POTASSIUM SERPL-SCNC: 4 MMOL/L (ref 3.5–5.1)
POTASSIUM SERPL-SCNC: 4.2 MMOL/L (ref 3.5–5.1)
POTASSIUM SERPL-SCNC: 4.2 MMOL/L (ref 3.5–5.1)
POTASSIUM SERPL-SCNC: 4.4 MMOL/L (ref 3.5–5.1)
POTASSIUM SERPL-SCNC: 5.2 MMOL/L (ref 3.5–5.1)
PPD POC: NEGATIVE NEGATIVE
PROCALCITONIN SERPL-MCNC: 0.1 NG/ML
PROT SERPL-MCNC: 6.9 G/DL (ref 6.3–8.2)
PROT UR STRIP-MCNC: NEGATIVE MG/DL
Q-T INTERVAL, ECG07: 418 MS
QRS DURATION, ECG06: 84 MS
QTC CALCULATION (BEZET), ECG08: 434 MS
RBC # BLD AUTO: 2.89 M/UL (ref 4.05–5.2)
RBC # BLD AUTO: 2.99 M/UL (ref 4.05–5.2)
RBC # BLD AUTO: 3.16 M/UL (ref 4.05–5.2)
RBC # BLD AUTO: 3.22 M/UL (ref 4.05–5.2)
RBC # BLD AUTO: 3.25 M/UL (ref 4.05–5.2)
RBC # BLD AUTO: 3.31 M/UL (ref 4.05–5.2)
RBC # BLD AUTO: 3.39 M/UL (ref 4.05–5.2)
RBC # BLD AUTO: 3.86 M/UL (ref 4.05–5.2)
RBC #/AREA URNS HPF: ABNORMAL /HPF
SERVICE CMNT-IMP: NORMAL
SODIUM SERPL-SCNC: 136 MMOL/L (ref 136–145)
SODIUM SERPL-SCNC: 140 MMOL/L (ref 136–145)
SODIUM SERPL-SCNC: 141 MMOL/L (ref 136–145)
SODIUM SERPL-SCNC: 142 MMOL/L (ref 136–145)
SODIUM SERPL-SCNC: 144 MMOL/L (ref 136–145)
SODIUM SERPL-SCNC: 144 MMOL/L (ref 136–145)
SP GR UR REFRACTOMETRY: 1.01 (ref 1–1.02)
TROPONIN I SERPL-MCNC: <0.02 NG/ML (ref 0.02–0.05)
UROBILINOGEN UR QL STRIP.AUTO: 0.2 EU/DL (ref 0.2–1)
VENTRICULAR RATE, ECG03: 65 BPM
WBC # BLD AUTO: 10.8 K/UL (ref 4.3–11.1)
WBC # BLD AUTO: 10.9 K/UL (ref 4.3–11.1)
WBC # BLD AUTO: 12.2 K/UL (ref 4.3–11.1)
WBC # BLD AUTO: 12.4 K/UL (ref 4.3–11.1)
WBC # BLD AUTO: 13 K/UL (ref 4.3–11.1)
WBC # BLD AUTO: 13.5 K/UL (ref 4.3–11.1)
WBC # BLD AUTO: 13.7 K/UL (ref 4.3–11.1)
WBC # BLD AUTO: 7.7 K/UL (ref 4.3–11.1)
WBC URNS QL MICRO: 0 /HPF

## 2019-01-01 PROCEDURE — 94640 AIRWAY INHALATION TREATMENT: CPT

## 2019-01-01 PROCEDURE — 94760 N-INVAS EAR/PLS OXIMETRY 1: CPT

## 2019-01-01 PROCEDURE — 80048 BASIC METABOLIC PNL TOTAL CA: CPT

## 2019-01-01 PROCEDURE — 74011250637 HC RX REV CODE- 250/637: Performed by: FAMILY MEDICINE

## 2019-01-01 PROCEDURE — 74011250637 HC RX REV CODE- 250/637: Performed by: INTERNAL MEDICINE

## 2019-01-01 PROCEDURE — 74011000250 HC RX REV CODE- 250: Performed by: INTERNAL MEDICINE

## 2019-01-01 PROCEDURE — 74011250636 HC RX REV CODE- 250/636: Performed by: INTERNAL MEDICINE

## 2019-01-01 PROCEDURE — 74011000250 HC RX REV CODE- 250: Performed by: EMERGENCY MEDICINE

## 2019-01-01 PROCEDURE — 36415 COLL VENOUS BLD VENIPUNCTURE: CPT

## 2019-01-01 PROCEDURE — 65270000029 HC RM PRIVATE

## 2019-01-01 PROCEDURE — 97530 THERAPEUTIC ACTIVITIES: CPT

## 2019-01-01 PROCEDURE — 97166 OT EVAL MOD COMPLEX 45 MIN: CPT

## 2019-01-01 PROCEDURE — 77030020250 HC SOL INJ D 5% LFCR 1000ML BG LF

## 2019-01-01 PROCEDURE — 77030011943

## 2019-01-01 PROCEDURE — 87088 URINE BACTERIA CULTURE: CPT

## 2019-01-01 PROCEDURE — 74011250636 HC RX REV CODE- 250/636: Performed by: FAMILY MEDICINE

## 2019-01-01 PROCEDURE — 74011000250 HC RX REV CODE- 250: Performed by: FAMILY MEDICINE

## 2019-01-01 PROCEDURE — 77030020256 HC SOL INJ NACL 0.9%  500ML

## 2019-01-01 PROCEDURE — 93005 ELECTROCARDIOGRAM TRACING: CPT | Performed by: EMERGENCY MEDICINE

## 2019-01-01 PROCEDURE — 84484 ASSAY OF TROPONIN QUANT: CPT

## 2019-01-01 PROCEDURE — 77010033678 HC OXYGEN DAILY

## 2019-01-01 PROCEDURE — 76450000000

## 2019-01-01 PROCEDURE — 74011000258 HC RX REV CODE- 258: Performed by: INTERNAL MEDICINE

## 2019-01-01 PROCEDURE — 0656 HSPC GENERAL INPATIENT

## 2019-01-01 PROCEDURE — 85025 COMPLETE CBC W/AUTO DIFF WBC: CPT

## 2019-01-01 PROCEDURE — 97110 THERAPEUTIC EXERCISES: CPT

## 2019-01-01 PROCEDURE — G0155 HHCP-SVS OF CSW,EA 15 MIN: HCPCS

## 2019-01-01 PROCEDURE — 84145 PROCALCITONIN (PCT): CPT

## 2019-01-01 PROCEDURE — G0299 HHS/HOSPICE OF RN EA 15 MIN: HCPCS

## 2019-01-01 PROCEDURE — 3336500001 HSPC ELECTION

## 2019-01-01 PROCEDURE — 97535 SELF CARE MNGMENT TRAINING: CPT

## 2019-01-01 PROCEDURE — 77030011256 HC DRSG MEPILEX <16IN NO BORD MOLN -A

## 2019-01-01 PROCEDURE — G0156 HHCP-SVS OF AIDE,EA 15 MIN: HCPCS

## 2019-01-01 PROCEDURE — 83880 ASSAY OF NATRIURETIC PEPTIDE: CPT

## 2019-01-01 PROCEDURE — 93005 ELECTROCARDIOGRAM TRACING: CPT

## 2019-01-01 PROCEDURE — 74011000258 HC RX REV CODE- 258: Performed by: EMERGENCY MEDICINE

## 2019-01-01 PROCEDURE — 83605 ASSAY OF LACTIC ACID: CPT

## 2019-01-01 PROCEDURE — 77030013140 HC MSK NEB VYRM -A

## 2019-01-01 PROCEDURE — 74011000258 HC RX REV CODE- 258: Performed by: FAMILY MEDICINE

## 2019-01-01 PROCEDURE — 71046 X-RAY EXAM CHEST 2 VIEWS: CPT

## 2019-01-01 PROCEDURE — 77030031642 HC BOOT FT ROOKE HFS OSBM -B

## 2019-01-01 PROCEDURE — C8929 TTE W OR WO FOL WCON,DOPPLER: HCPCS

## 2019-01-01 PROCEDURE — 97162 PT EVAL MOD COMPLEX 30 MIN: CPT

## 2019-01-01 PROCEDURE — 81001 URINALYSIS AUTO W/SCOPE: CPT

## 2019-01-01 PROCEDURE — 74011250636 HC RX REV CODE- 250/636: Performed by: NURSE PRACTITIONER

## 2019-01-01 PROCEDURE — 77030027688 HC DRSG MEPILEX 16-48IN NO BORD MOLN -A

## 2019-01-01 PROCEDURE — 92526 ORAL FUNCTION THERAPY: CPT

## 2019-01-01 PROCEDURE — 87040 BLOOD CULTURE FOR BACTERIA: CPT

## 2019-01-01 PROCEDURE — 92610 EVALUATE SWALLOWING FUNCTION: CPT

## 2019-01-01 PROCEDURE — 73110 X-RAY EXAM OF WRIST: CPT

## 2019-01-01 PROCEDURE — 83735 ASSAY OF MAGNESIUM: CPT

## 2019-01-01 PROCEDURE — 85027 COMPLETE CBC AUTOMATED: CPT

## 2019-01-01 PROCEDURE — 86580 TB INTRADERMAL TEST: CPT | Performed by: FAMILY MEDICINE

## 2019-01-01 PROCEDURE — 99284 EMERGENCY DEPT VISIT MOD MDM: CPT | Performed by: EMERGENCY MEDICINE

## 2019-01-01 PROCEDURE — 74011000250 HC RX REV CODE- 250: Performed by: NURSE PRACTITIONER

## 2019-01-01 PROCEDURE — 80053 COMPREHEN METABOLIC PANEL: CPT

## 2019-01-01 PROCEDURE — 71045 X-RAY EXAM CHEST 1 VIEW: CPT

## 2019-01-01 PROCEDURE — 74011000302 HC RX REV CODE- 302: Performed by: FAMILY MEDICINE

## 2019-01-01 PROCEDURE — 99222 1ST HOSP IP/OBS MODERATE 55: CPT | Performed by: INTERNAL MEDICINE

## 2019-01-01 PROCEDURE — 87086 URINE CULTURE/COLONY COUNT: CPT

## 2019-01-01 PROCEDURE — 74011250636 HC RX REV CODE- 250/636: Performed by: EMERGENCY MEDICINE

## 2019-01-01 PROCEDURE — 99222 1ST HOSP IP/OBS MODERATE 55: CPT | Performed by: NURSE PRACTITIONER

## 2019-01-01 RX ORDER — AMIODARONE HYDROCHLORIDE 200 MG/1
100 TABLET ORAL DAILY
Status: DISCONTINUED | OUTPATIENT
Start: 2019-01-01 | End: 2019-01-01

## 2019-01-01 RX ORDER — SODIUM CHLORIDE 0.9 % (FLUSH) 0.9 %
3 SYRINGE (ML) INJECTION EVERY 12 HOURS
Status: DISCONTINUED | OUTPATIENT
Start: 2019-01-01 | End: 2019-01-01 | Stop reason: HOSPADM

## 2019-01-01 RX ORDER — CODEINE PHOSPHATE AND GUAIFENESIN 10; 100 MG/5ML; MG/5ML
5 SOLUTION ORAL
Status: DISCONTINUED | OUTPATIENT
Start: 2019-01-01 | End: 2019-01-01

## 2019-01-01 RX ORDER — IPRATROPIUM BROMIDE AND ALBUTEROL SULFATE 2.5; .5 MG/3ML; MG/3ML
3 SOLUTION RESPIRATORY (INHALATION)
Status: COMPLETED | OUTPATIENT
Start: 2019-01-01 | End: 2019-01-01

## 2019-01-01 RX ORDER — IPRATROPIUM BROMIDE AND ALBUTEROL SULFATE 2.5; .5 MG/3ML; MG/3ML
3 SOLUTION RESPIRATORY (INHALATION)
Status: DISCONTINUED | OUTPATIENT
Start: 2019-01-01 | End: 2019-01-01

## 2019-01-01 RX ORDER — CARVEDILOL 3.12 MG/1
3.12 TABLET ORAL 2 TIMES DAILY WITH MEALS
Status: DISCONTINUED | OUTPATIENT
Start: 2019-01-01 | End: 2019-01-01

## 2019-01-01 RX ORDER — HYOSCYAMINE SULFATE 0.12 MG/1
0.12 TABLET SUBLINGUAL
Status: DISCONTINUED | OUTPATIENT
Start: 2019-01-01 | End: 2019-01-01

## 2019-01-01 RX ORDER — SENNOSIDES 8.6 MG/1
1 TABLET ORAL 2 TIMES DAILY
Status: DISCONTINUED | OUTPATIENT
Start: 2019-01-01 | End: 2019-01-01

## 2019-01-01 RX ORDER — FUROSEMIDE 20 MG/1
20 TABLET ORAL DAILY
Status: DISCONTINUED | OUTPATIENT
Start: 2019-01-01 | End: 2019-01-01

## 2019-01-01 RX ORDER — LORAZEPAM 2 MG/ML
1 INJECTION INTRAMUSCULAR
Status: DISCONTINUED | OUTPATIENT
Start: 2019-01-01 | End: 2019-01-01 | Stop reason: HOSPADM

## 2019-01-01 RX ORDER — MORPHINE SULFATE 2 MG/ML
1 INJECTION, SOLUTION INTRAMUSCULAR; INTRAVENOUS
Status: DISCONTINUED | OUTPATIENT
Start: 2019-01-01 | End: 2019-01-01

## 2019-01-01 RX ORDER — POTASSIUM CHLORIDE 20 MEQ/1
40 TABLET, EXTENDED RELEASE ORAL
Status: COMPLETED | OUTPATIENT
Start: 2019-01-01 | End: 2019-01-01

## 2019-01-01 RX ORDER — DOCUSATE SODIUM 100 MG/1
100 CAPSULE, LIQUID FILLED ORAL 2 TIMES DAILY
Status: DISCONTINUED | OUTPATIENT
Start: 2019-01-01 | End: 2019-01-01

## 2019-01-01 RX ORDER — SODIUM CHLORIDE 0.9 % (FLUSH) 0.9 %
3 SYRINGE (ML) INJECTION AS NEEDED
Status: DISCONTINUED | OUTPATIENT
Start: 2019-01-01 | End: 2019-01-01 | Stop reason: HOSPADM

## 2019-01-01 RX ORDER — PETROLATUM 42 G/100G
OINTMENT TOPICAL DAILY
Status: DISCONTINUED | OUTPATIENT
Start: 2019-01-01 | End: 2019-01-01 | Stop reason: SDUPTHER

## 2019-01-01 RX ORDER — LOPERAMIDE HYDROCHLORIDE 2 MG/1
4 CAPSULE ORAL AS NEEDED
Status: DISCONTINUED | OUTPATIENT
Start: 2019-01-01 | End: 2019-01-01

## 2019-01-01 RX ORDER — GLYCOPYRROLATE 0.2 MG/ML
0.2 INJECTION INTRAMUSCULAR; INTRAVENOUS
Status: DISCONTINUED | OUTPATIENT
Start: 2019-01-01 | End: 2019-01-01

## 2019-01-01 RX ORDER — MORPHINE SULFATE 100 MG/5ML
10 SOLUTION ORAL
Status: DISCONTINUED | OUTPATIENT
Start: 2019-01-01 | End: 2019-01-01

## 2019-01-01 RX ORDER — SODIUM CHLORIDE 0.9 % (FLUSH) 0.9 %
5-10 SYRINGE (ML) INJECTION AS NEEDED
Status: DISCONTINUED | OUTPATIENT
Start: 2019-01-01 | End: 2019-01-01

## 2019-01-01 RX ORDER — HEPARIN SODIUM 5000 [USP'U]/ML
5000 INJECTION, SOLUTION INTRAVENOUS; SUBCUTANEOUS EVERY 8 HOURS
Status: DISCONTINUED | OUTPATIENT
Start: 2019-01-01 | End: 2019-01-01

## 2019-01-01 RX ORDER — POTASSIUM CHLORIDE 20 MEQ/1
20 TABLET, EXTENDED RELEASE ORAL 2 TIMES DAILY
Status: DISCONTINUED | OUTPATIENT
Start: 2019-01-01 | End: 2019-01-01

## 2019-01-01 RX ORDER — QUETIAPINE FUMARATE 25 MG/1
25 TABLET, FILM COATED ORAL
Status: DISCONTINUED | OUTPATIENT
Start: 2019-01-01 | End: 2019-01-01

## 2019-01-01 RX ORDER — POTASSIUM CHLORIDE 14.9 MG/ML
20 INJECTION INTRAVENOUS
Status: COMPLETED | OUTPATIENT
Start: 2019-01-01 | End: 2019-01-01

## 2019-01-01 RX ORDER — AMOXICILLIN 250 MG
1 CAPSULE ORAL DAILY
Status: DISCONTINUED | OUTPATIENT
Start: 2019-01-01 | End: 2019-01-01

## 2019-01-01 RX ORDER — ACETAMINOPHEN 325 MG/1
650 TABLET ORAL
Status: DISCONTINUED | OUTPATIENT
Start: 2019-01-01 | End: 2019-01-01

## 2019-01-01 RX ORDER — MORPHINE SULFATE 2 MG/ML
1 INJECTION, SOLUTION INTRAMUSCULAR; INTRAVENOUS
Status: DISCONTINUED | OUTPATIENT
Start: 2019-01-01 | End: 2019-01-01 | Stop reason: HOSPADM

## 2019-01-01 RX ORDER — FUROSEMIDE 10 MG/ML
20 INJECTION INTRAMUSCULAR; INTRAVENOUS ONCE
Status: COMPLETED | OUTPATIENT
Start: 2019-01-01 | End: 2019-01-01

## 2019-01-01 RX ORDER — ACETAMINOPHEN 650 MG/1
650 SUPPOSITORY RECTAL
Status: DISCONTINUED | OUTPATIENT
Start: 2019-01-01 | End: 2019-01-01 | Stop reason: HOSPADM

## 2019-01-01 RX ORDER — HALOPERIDOL 5 MG/ML
2 INJECTION INTRAMUSCULAR
Status: DISCONTINUED | OUTPATIENT
Start: 2019-01-01 | End: 2019-01-01

## 2019-01-01 RX ORDER — IPRATROPIUM BROMIDE AND ALBUTEROL SULFATE 2.5; .5 MG/3ML; MG/3ML
3 SOLUTION RESPIRATORY (INHALATION)
Status: DISCONTINUED | OUTPATIENT
Start: 2019-01-01 | End: 2019-01-01 | Stop reason: HOSPADM

## 2019-01-01 RX ORDER — FUROSEMIDE 10 MG/ML
20 INJECTION INTRAMUSCULAR; INTRAVENOUS EVERY 8 HOURS
Status: DISCONTINUED | OUTPATIENT
Start: 2019-01-01 | End: 2019-01-01

## 2019-01-01 RX ORDER — GLYCOPYRROLATE 0.2 MG/ML
0.2 INJECTION INTRAMUSCULAR; INTRAVENOUS
Status: DISCONTINUED | OUTPATIENT
Start: 2019-01-01 | End: 2019-01-01 | Stop reason: HOSPADM

## 2019-01-01 RX ORDER — BISACODYL 5 MG
5 TABLET, DELAYED RELEASE (ENTERIC COATED) ORAL DAILY PRN
Status: DISCONTINUED | OUTPATIENT
Start: 2019-01-01 | End: 2019-01-01

## 2019-01-01 RX ORDER — PETROLATUM 420 MG/G
OINTMENT TOPICAL DAILY
Status: DISCONTINUED | OUTPATIENT
Start: 2019-01-01 | End: 2019-01-01

## 2019-01-01 RX ORDER — HALOPERIDOL 5 MG/ML
2 INJECTION INTRAMUSCULAR EVERY 8 HOURS
Status: DISCONTINUED | OUTPATIENT
Start: 2019-01-01 | End: 2019-01-01 | Stop reason: HOSPADM

## 2019-01-01 RX ORDER — FUROSEMIDE 10 MG/ML
40 INJECTION INTRAMUSCULAR; INTRAVENOUS EVERY 8 HOURS
Status: DISCONTINUED | OUTPATIENT
Start: 2019-01-01 | End: 2019-01-01

## 2019-01-01 RX ORDER — MORPHINE SULFATE 2 MG/ML
2 INJECTION, SOLUTION INTRAMUSCULAR; INTRAVENOUS
Status: DISCONTINUED | OUTPATIENT
Start: 2019-01-01 | End: 2019-01-01 | Stop reason: HOSPADM

## 2019-01-01 RX ORDER — HALOPERIDOL 5 MG/ML
2 INJECTION INTRAMUSCULAR
Status: DISCONTINUED | OUTPATIENT
Start: 2019-01-01 | End: 2019-01-01 | Stop reason: HOSPADM

## 2019-01-01 RX ORDER — AZITHROMYCIN 250 MG/1
250 TABLET, FILM COATED ORAL DAILY
Status: COMPLETED | OUTPATIENT
Start: 2019-01-01 | End: 2019-01-01

## 2019-01-01 RX ORDER — SODIUM CHLORIDE 0.9 % (FLUSH) 0.9 %
5-40 SYRINGE (ML) INJECTION AS NEEDED
Status: DISCONTINUED | OUTPATIENT
Start: 2019-01-01 | End: 2019-01-01

## 2019-01-01 RX ORDER — ALBUTEROL SULFATE 0.83 MG/ML
2.5 SOLUTION RESPIRATORY (INHALATION)
Status: DISCONTINUED | OUTPATIENT
Start: 2019-01-01 | End: 2019-01-01

## 2019-01-01 RX ORDER — LISINOPRIL 5 MG/1
5 TABLET ORAL DAILY
Status: DISCONTINUED | OUTPATIENT
Start: 2019-01-01 | End: 2019-01-01

## 2019-01-01 RX ORDER — DEXTROSE MONOHYDRATE 50 MG/ML
75 INJECTION, SOLUTION INTRAVENOUS CONTINUOUS
Status: DISCONTINUED | OUTPATIENT
Start: 2019-01-01 | End: 2019-01-01

## 2019-01-01 RX ORDER — LEVOTHYROXINE SODIUM 50 UG/1
50 TABLET ORAL
Status: DISCONTINUED | OUTPATIENT
Start: 2019-01-01 | End: 2019-01-01

## 2019-01-01 RX ORDER — MORPHINE SULFATE 2 MG/ML
0.5 INJECTION, SOLUTION INTRAMUSCULAR; INTRAVENOUS
Status: DISCONTINUED | OUTPATIENT
Start: 2019-01-01 | End: 2019-01-01

## 2019-01-01 RX ORDER — FACIAL-BODY WIPES
10 EACH TOPICAL AS NEEDED
Status: DISCONTINUED | OUTPATIENT
Start: 2019-01-01 | End: 2019-01-01 | Stop reason: HOSPADM

## 2019-01-01 RX ORDER — SODIUM CHLORIDE 0.9 % (FLUSH) 0.9 %
5-40 SYRINGE (ML) INJECTION EVERY 8 HOURS
Status: DISCONTINUED | OUTPATIENT
Start: 2019-01-01 | End: 2019-01-01

## 2019-01-01 RX ORDER — SODIUM CHLORIDE 9 MG/ML
100 INJECTION, SOLUTION INTRAVENOUS ONCE
Status: COMPLETED | OUTPATIENT
Start: 2019-01-01 | End: 2019-01-01

## 2019-01-01 RX ORDER — ONDANSETRON 2 MG/ML
4 INJECTION INTRAMUSCULAR; INTRAVENOUS
Status: DISCONTINUED | OUTPATIENT
Start: 2019-01-01 | End: 2019-01-01

## 2019-01-01 RX ORDER — ATORVASTATIN CALCIUM 10 MG/1
10 TABLET, FILM COATED ORAL
Status: DISCONTINUED | OUTPATIENT
Start: 2019-01-01 | End: 2019-01-01

## 2019-01-01 RX ADMIN — HEPARIN SODIUM 5000 UNITS: 5000 INJECTION INTRAVENOUS; SUBCUTANEOUS at 13:12

## 2019-01-01 RX ADMIN — ATORVASTATIN CALCIUM 10 MG: 10 TABLET, FILM COATED ORAL at 21:05

## 2019-01-01 RX ADMIN — HEPARIN SODIUM 5000 UNITS: 5000 INJECTION INTRAVENOUS; SUBCUTANEOUS at 05:52

## 2019-01-01 RX ADMIN — IPRATROPIUM BROMIDE AND ALBUTEROL SULFATE 3 ML: .5; 3 SOLUTION RESPIRATORY (INHALATION) at 20:04

## 2019-01-01 RX ADMIN — IPRATROPIUM BROMIDE AND ALBUTEROL SULFATE 3 ML: .5; 3 SOLUTION RESPIRATORY (INHALATION) at 08:29

## 2019-01-01 RX ADMIN — HALOPERIDOL LACTATE 2 MG: 5 INJECTION INTRAMUSCULAR at 09:11

## 2019-01-01 RX ADMIN — HEPARIN SODIUM 5000 UNITS: 5000 INJECTION INTRAVENOUS; SUBCUTANEOUS at 05:55

## 2019-01-01 RX ADMIN — CEFTRIAXONE SODIUM 1 G: 1 INJECTION, POWDER, FOR SOLUTION INTRAMUSCULAR; INTRAVENOUS at 17:34

## 2019-01-01 RX ADMIN — ATORVASTATIN CALCIUM 10 MG: 10 TABLET, FILM COATED ORAL at 21:30

## 2019-01-01 RX ADMIN — LORAZEPAM 1 MG: 2 INJECTION INTRAMUSCULAR at 14:00

## 2019-01-01 RX ADMIN — PERFLUTREN 1 ML: 6.52 INJECTION, SUSPENSION INTRAVENOUS at 15:41

## 2019-01-01 RX ADMIN — PETROLATUM: 420 OINTMENT TOPICAL at 08:16

## 2019-01-01 RX ADMIN — Medication 5 ML: at 21:13

## 2019-01-01 RX ADMIN — FUROSEMIDE 20 MG: 20 TABLET ORAL at 08:12

## 2019-01-01 RX ADMIN — Medication 3 ML: at 09:11

## 2019-01-01 RX ADMIN — Medication 3 ML: at 21:36

## 2019-01-01 RX ADMIN — IPRATROPIUM BROMIDE AND ALBUTEROL SULFATE 3 ML: .5; 3 SOLUTION RESPIRATORY (INHALATION) at 08:10

## 2019-01-01 RX ADMIN — QUETIAPINE FUMARATE 25 MG: 25 TABLET ORAL at 21:50

## 2019-01-01 RX ADMIN — LISINOPRIL 5 MG: 5 TABLET ORAL at 09:18

## 2019-01-01 RX ADMIN — DOCUSATE SODIUM 100 MG: 100 CAPSULE, LIQUID FILLED ORAL at 21:57

## 2019-01-01 RX ADMIN — HEPARIN SODIUM 5000 UNITS: 5000 INJECTION INTRAVENOUS; SUBCUTANEOUS at 21:53

## 2019-01-01 RX ADMIN — MORPHINE SULFATE 2 MG: 2 INJECTION, SOLUTION INTRAMUSCULAR; INTRAVENOUS at 02:06

## 2019-01-01 RX ADMIN — HEPARIN SODIUM 5000 UNITS: 5000 INJECTION INTRAVENOUS; SUBCUTANEOUS at 12:59

## 2019-01-01 RX ADMIN — IPRATROPIUM BROMIDE AND ALBUTEROL SULFATE 3 ML: .5; 3 SOLUTION RESPIRATORY (INHALATION) at 14:49

## 2019-01-01 RX ADMIN — DOCUSATE SODIUM 100 MG: 100 CAPSULE, LIQUID FILLED ORAL at 08:23

## 2019-01-01 RX ADMIN — HEPARIN SODIUM 5000 UNITS: 5000 INJECTION INTRAVENOUS; SUBCUTANEOUS at 13:24

## 2019-01-01 RX ADMIN — AMIODARONE HYDROCHLORIDE 100 MG: 200 TABLET ORAL at 08:42

## 2019-01-01 RX ADMIN — FUROSEMIDE 20 MG: 10 INJECTION, SOLUTION INTRAMUSCULAR; INTRAVENOUS at 08:44

## 2019-01-01 RX ADMIN — AMIODARONE HYDROCHLORIDE 100 MG: 200 TABLET ORAL at 09:00

## 2019-01-01 RX ADMIN — IPRATROPIUM BROMIDE AND ALBUTEROL SULFATE 3 ML: .5; 3 SOLUTION RESPIRATORY (INHALATION) at 14:39

## 2019-01-01 RX ADMIN — IPRATROPIUM BROMIDE AND ALBUTEROL SULFATE 3 ML: .5; 3 SOLUTION RESPIRATORY (INHALATION) at 08:20

## 2019-01-01 RX ADMIN — POTASSIUM CHLORIDE 20 MEQ: 200 INJECTION, SOLUTION INTRAVENOUS at 09:22

## 2019-01-01 RX ADMIN — Medication 5 ML: at 15:11

## 2019-01-01 RX ADMIN — GUAIFENESIN AND CODEINE PHOSPHATE 5 ML: 10; 100 LIQUID ORAL at 09:43

## 2019-01-01 RX ADMIN — IPRATROPIUM BROMIDE AND ALBUTEROL SULFATE 3 ML: .5; 3 SOLUTION RESPIRATORY (INHALATION) at 07:50

## 2019-01-01 RX ADMIN — ATORVASTATIN CALCIUM 10 MG: 10 TABLET, FILM COATED ORAL at 21:34

## 2019-01-01 RX ADMIN — ATORVASTATIN CALCIUM 10 MG: 10 TABLET, FILM COATED ORAL at 21:24

## 2019-01-01 RX ADMIN — SENNOSIDES AND DOCUSATE SODIUM 1 TABLET: 8.6; 5 TABLET ORAL at 08:44

## 2019-01-01 RX ADMIN — LEVOTHYROXINE SODIUM 50 MCG: 50 TABLET ORAL at 06:05

## 2019-01-01 RX ADMIN — AMIODARONE HYDROCHLORIDE 100 MG: 200 TABLET ORAL at 08:12

## 2019-01-01 RX ADMIN — IPRATROPIUM BROMIDE AND ALBUTEROL SULFATE 3 ML: .5; 3 SOLUTION RESPIRATORY (INHALATION) at 08:00

## 2019-01-01 RX ADMIN — MORPHINE SULFATE 2 MG: 2 INJECTION, SOLUTION INTRAMUSCULAR; INTRAVENOUS at 10:38

## 2019-01-01 RX ADMIN — HEPARIN SODIUM 5000 UNITS: 5000 INJECTION INTRAVENOUS; SUBCUTANEOUS at 21:31

## 2019-01-01 RX ADMIN — SENNOSIDES AND DOCUSATE SODIUM 1 TABLET: 8.6; 5 TABLET ORAL at 08:42

## 2019-01-01 RX ADMIN — LEVOTHYROXINE SODIUM 50 MCG: 50 TABLET ORAL at 05:52

## 2019-01-01 RX ADMIN — PIPERACILLIN SODIUM,TAZOBACTAM SODIUM 4.5 G: 4; .5 INJECTION, POWDER, FOR SOLUTION INTRAVENOUS at 05:56

## 2019-01-01 RX ADMIN — IPRATROPIUM BROMIDE AND ALBUTEROL SULFATE 3 ML: .5; 3 SOLUTION RESPIRATORY (INHALATION) at 15:01

## 2019-01-01 RX ADMIN — MORPHINE SULFATE 2 MG: 2 INJECTION, SOLUTION INTRAMUSCULAR; INTRAVENOUS at 22:18

## 2019-01-01 RX ADMIN — Medication 10 ML: at 13:25

## 2019-01-01 RX ADMIN — IPRATROPIUM BROMIDE AND ALBUTEROL SULFATE 3 ML: .5; 3 SOLUTION RESPIRATORY (INHALATION) at 01:30

## 2019-01-01 RX ADMIN — CARVEDILOL 3.12 MG: 3.12 TABLET, FILM COATED ORAL at 17:14

## 2019-01-01 RX ADMIN — PIPERACILLIN SODIUM,TAZOBACTAM SODIUM 4.5 G: 4; .5 INJECTION, POWDER, FOR SOLUTION INTRAVENOUS at 06:03

## 2019-01-01 RX ADMIN — DEXTROSE MONOHYDRATE 75 ML/HR: 5 INJECTION, SOLUTION INTRAVENOUS at 03:18

## 2019-01-01 RX ADMIN — LEVOTHYROXINE SODIUM 50 MCG: 50 TABLET ORAL at 08:13

## 2019-01-01 RX ADMIN — GUAIFENESIN AND CODEINE PHOSPHATE 5 ML: 10; 100 LIQUID ORAL at 00:38

## 2019-01-01 RX ADMIN — POTASSIUM CHLORIDE 40 MEQ: 20 TABLET, EXTENDED RELEASE ORAL at 09:47

## 2019-01-01 RX ADMIN — Medication 5 ML: at 05:56

## 2019-01-01 RX ADMIN — HALOPERIDOL LACTATE 2 MG: 5 INJECTION INTRAMUSCULAR at 02:05

## 2019-01-01 RX ADMIN — IPRATROPIUM BROMIDE AND ALBUTEROL SULFATE 3 ML: .5; 3 SOLUTION RESPIRATORY (INHALATION) at 13:38

## 2019-01-01 RX ADMIN — PIPERACILLIN SODIUM,TAZOBACTAM SODIUM 4.5 G: 4; .5 INJECTION, POWDER, FOR SOLUTION INTRAVENOUS at 05:40

## 2019-01-01 RX ADMIN — CARVEDILOL 3.12 MG: 3.12 TABLET, FILM COATED ORAL at 09:18

## 2019-01-01 RX ADMIN — Medication 10 ML: at 22:00

## 2019-01-01 RX ADMIN — QUETIAPINE FUMARATE 25 MG: 25 TABLET ORAL at 21:41

## 2019-01-01 RX ADMIN — HALOPERIDOL LACTATE 2 MG: 5 INJECTION INTRAMUSCULAR at 21:01

## 2019-01-01 RX ADMIN — IPRATROPIUM BROMIDE AND ALBUTEROL SULFATE 3 ML: .5; 3 SOLUTION RESPIRATORY (INHALATION) at 01:38

## 2019-01-01 RX ADMIN — Medication 5 ML: at 13:46

## 2019-01-01 RX ADMIN — MORPHINE SULFATE 2 MG: 2 INJECTION, SOLUTION INTRAMUSCULAR; INTRAVENOUS at 12:21

## 2019-01-01 RX ADMIN — Medication 5 ML: at 14:43

## 2019-01-01 RX ADMIN — IPRATROPIUM BROMIDE AND ALBUTEROL SULFATE 3 ML: .5; 3 SOLUTION RESPIRATORY (INHALATION) at 20:51

## 2019-01-01 RX ADMIN — PIPERACILLIN SODIUM,TAZOBACTAM SODIUM 4.5 G: 4; .5 INJECTION, POWDER, FOR SOLUTION INTRAVENOUS at 21:51

## 2019-01-01 RX ADMIN — AMIODARONE HYDROCHLORIDE 100 MG: 200 TABLET ORAL at 09:18

## 2019-01-01 RX ADMIN — AMIODARONE HYDROCHLORIDE 100 MG: 200 TABLET ORAL at 08:55

## 2019-01-01 RX ADMIN — POTASSIUM CHLORIDE 20 MEQ: 20 TABLET, EXTENDED RELEASE ORAL at 09:18

## 2019-01-01 RX ADMIN — LORAZEPAM 1 MG: 2 INJECTION INTRAMUSCULAR at 22:00

## 2019-01-01 RX ADMIN — MORPHINE SULFATE 2 MG: 2 INJECTION, SOLUTION INTRAMUSCULAR; INTRAVENOUS at 18:02

## 2019-01-01 RX ADMIN — SODIUM CHLORIDE, PRESERVATIVE FREE 3 ML: 5 INJECTION INTRAVENOUS at 12:21

## 2019-01-01 RX ADMIN — GUAIFENESIN AND CODEINE PHOSPHATE 5 ML: 10; 100 LIQUID ORAL at 14:19

## 2019-01-01 RX ADMIN — SENNOSIDES AND DOCUSATE SODIUM 1 TABLET: 8.6; 5 TABLET ORAL at 09:47

## 2019-01-01 RX ADMIN — AMIODARONE HYDROCHLORIDE 100 MG: 200 TABLET ORAL at 09:46

## 2019-01-01 RX ADMIN — HEPARIN SODIUM 5000 UNITS: 5000 INJECTION INTRAVENOUS; SUBCUTANEOUS at 05:44

## 2019-01-01 RX ADMIN — IPRATROPIUM BROMIDE AND ALBUTEROL SULFATE 3 ML: .5; 3 SOLUTION RESPIRATORY (INHALATION) at 02:50

## 2019-01-01 RX ADMIN — AZITHROMYCIN MONOHYDRATE 500 MG: 500 INJECTION, POWDER, LYOPHILIZED, FOR SOLUTION INTRAVENOUS at 19:14

## 2019-01-01 RX ADMIN — CARVEDILOL 3.12 MG: 3.12 TABLET, FILM COATED ORAL at 17:25

## 2019-01-01 RX ADMIN — FUROSEMIDE 20 MG: 10 INJECTION, SOLUTION INTRAMUSCULAR; INTRAVENOUS at 16:37

## 2019-01-01 RX ADMIN — LISINOPRIL 5 MG: 5 TABLET ORAL at 08:44

## 2019-01-01 RX ADMIN — HEPARIN SODIUM 5000 UNITS: 5000 INJECTION INTRAVENOUS; SUBCUTANEOUS at 21:50

## 2019-01-01 RX ADMIN — CEFTRIAXONE SODIUM 1 G: 1 INJECTION, POWDER, FOR SOLUTION INTRAMUSCULAR; INTRAVENOUS at 17:25

## 2019-01-01 RX ADMIN — DOCUSATE SODIUM 100 MG: 100 CAPSULE, LIQUID FILLED ORAL at 17:33

## 2019-01-01 RX ADMIN — POTASSIUM CHLORIDE 20 MEQ: 20 TABLET, EXTENDED RELEASE ORAL at 18:20

## 2019-01-01 RX ADMIN — MORPHINE SULFATE 2 MG: 2 INJECTION, SOLUTION INTRAMUSCULAR; INTRAVENOUS at 17:59

## 2019-01-01 RX ADMIN — HEPARIN SODIUM 5000 UNITS: 5000 INJECTION INTRAVENOUS; SUBCUTANEOUS at 05:17

## 2019-01-01 RX ADMIN — Medication 10 ML: at 13:18

## 2019-01-01 RX ADMIN — SODIUM CHLORIDE, PRESERVATIVE FREE 3 ML: 5 INJECTION INTRAVENOUS at 13:49

## 2019-01-01 RX ADMIN — LEVOTHYROXINE SODIUM 50 MCG: 50 TABLET ORAL at 08:42

## 2019-01-01 RX ADMIN — MORPHINE SULFATE 0.5 MG: 2 INJECTION, SOLUTION INTRAMUSCULAR; INTRAVENOUS at 00:53

## 2019-01-01 RX ADMIN — IPRATROPIUM BROMIDE AND ALBUTEROL SULFATE 3 ML: .5; 3 SOLUTION RESPIRATORY (INHALATION) at 19:12

## 2019-01-01 RX ADMIN — MORPHINE SULFATE 0.5 MG: 2 INJECTION, SOLUTION INTRAMUSCULAR; INTRAVENOUS at 12:09

## 2019-01-01 RX ADMIN — LORAZEPAM 1 MG: 2 INJECTION INTRAMUSCULAR; INTRAVENOUS at 10:38

## 2019-01-01 RX ADMIN — GUAIFENESIN AND CODEINE PHOSPHATE 5 ML: 10; 100 LIQUID ORAL at 19:53

## 2019-01-01 RX ADMIN — CARVEDILOL 3.12 MG: 3.12 TABLET, FILM COATED ORAL at 17:33

## 2019-01-01 RX ADMIN — Medication 5 ML: at 05:45

## 2019-01-01 RX ADMIN — ATORVASTATIN CALCIUM 10 MG: 10 TABLET, FILM COATED ORAL at 21:02

## 2019-01-01 RX ADMIN — Medication 3 ML: at 21:00

## 2019-01-01 RX ADMIN — IPRATROPIUM BROMIDE AND ALBUTEROL SULFATE 3 ML: .5; 3 SOLUTION RESPIRATORY (INHALATION) at 13:51

## 2019-01-01 RX ADMIN — MORPHINE SULFATE 1 MG: 2 INJECTION, SOLUTION INTRAMUSCULAR; INTRAVENOUS at 21:56

## 2019-01-01 RX ADMIN — HEPARIN SODIUM 5000 UNITS: 5000 INJECTION INTRAVENOUS; SUBCUTANEOUS at 05:24

## 2019-01-01 RX ADMIN — AMIODARONE HYDROCHLORIDE 100 MG: 200 TABLET ORAL at 08:43

## 2019-01-01 RX ADMIN — GUAIFENESIN AND CODEINE PHOSPHATE 5 ML: 10; 100 LIQUID ORAL at 23:56

## 2019-01-01 RX ADMIN — IPRATROPIUM BROMIDE AND ALBUTEROL SULFATE 3 ML: .5; 3 SOLUTION RESPIRATORY (INHALATION) at 07:45

## 2019-01-01 RX ADMIN — SENNOSIDES AND DOCUSATE SODIUM 1 TABLET: 8.6; 5 TABLET ORAL at 09:44

## 2019-01-01 RX ADMIN — MORPHINE SULFATE 2 MG: 2 INJECTION, SOLUTION INTRAMUSCULAR; INTRAVENOUS at 09:11

## 2019-01-01 RX ADMIN — HEPARIN SODIUM 5000 UNITS: 5000 INJECTION INTRAVENOUS; SUBCUTANEOUS at 06:05

## 2019-01-01 RX ADMIN — HEPARIN SODIUM 5000 UNITS: 5000 INJECTION INTRAVENOUS; SUBCUTANEOUS at 12:18

## 2019-01-01 RX ADMIN — IPRATROPIUM BROMIDE AND ALBUTEROL SULFATE 3 ML: .5; 3 SOLUTION RESPIRATORY (INHALATION) at 19:45

## 2019-01-01 RX ADMIN — PETROLATUM: 420 OINTMENT TOPICAL at 08:44

## 2019-01-01 RX ADMIN — Medication 10 ML: at 06:00

## 2019-01-01 RX ADMIN — LEVOTHYROXINE SODIUM 50 MCG: 50 TABLET ORAL at 05:43

## 2019-01-01 RX ADMIN — CARVEDILOL 3.12 MG: 3.12 TABLET, FILM COATED ORAL at 08:44

## 2019-01-01 RX ADMIN — HEPARIN SODIUM 5000 UNITS: 5000 INJECTION INTRAVENOUS; SUBCUTANEOUS at 21:30

## 2019-01-01 RX ADMIN — ALBUTEROL SULFATE 2.5 MG: 2.5 SOLUTION RESPIRATORY (INHALATION) at 01:39

## 2019-01-01 RX ADMIN — SENNOSIDES AND DOCUSATE SODIUM 1 TABLET: 8.6; 5 TABLET ORAL at 09:17

## 2019-01-01 RX ADMIN — Medication 5 ML: at 13:23

## 2019-01-01 RX ADMIN — PETROLATUM: 420 OINTMENT TOPICAL at 09:48

## 2019-01-01 RX ADMIN — IPRATROPIUM BROMIDE AND ALBUTEROL SULFATE 3 ML: .5; 3 SOLUTION RESPIRATORY (INHALATION) at 07:56

## 2019-01-01 RX ADMIN — GLYCOPYRROLATE 0.2 MG: 0.2 INJECTION INTRAMUSCULAR; INTRAVENOUS at 12:20

## 2019-01-01 RX ADMIN — CARVEDILOL 3.12 MG: 3.12 TABLET, FILM COATED ORAL at 08:42

## 2019-01-01 RX ADMIN — ATORVASTATIN CALCIUM 10 MG: 10 TABLET, FILM COATED ORAL at 21:57

## 2019-01-01 RX ADMIN — CARVEDILOL 3.12 MG: 3.12 TABLET, FILM COATED ORAL at 09:46

## 2019-01-01 RX ADMIN — IPRATROPIUM BROMIDE AND ALBUTEROL SULFATE 3 ML: .5; 3 SOLUTION RESPIRATORY (INHALATION) at 02:46

## 2019-01-01 RX ADMIN — LEVOTHYROXINE SODIUM 50 MCG: 50 TABLET ORAL at 05:25

## 2019-01-01 RX ADMIN — CEFTRIAXONE SODIUM 1 G: 1 INJECTION, POWDER, FOR SOLUTION INTRAMUSCULAR; INTRAVENOUS at 18:15

## 2019-01-01 RX ADMIN — FUROSEMIDE 40 MG: 10 INJECTION, SOLUTION INTRAMUSCULAR; INTRAVENOUS at 09:45

## 2019-01-01 RX ADMIN — IPRATROPIUM BROMIDE AND ALBUTEROL SULFATE 3 ML: .5; 3 SOLUTION RESPIRATORY (INHALATION) at 14:47

## 2019-01-01 RX ADMIN — CARVEDILOL 3.12 MG: 3.12 TABLET, FILM COATED ORAL at 08:23

## 2019-01-01 RX ADMIN — IPRATROPIUM BROMIDE AND ALBUTEROL SULFATE 3 ML: .5; 3 SOLUTION RESPIRATORY (INHALATION) at 19:58

## 2019-01-01 RX ADMIN — PIPERACILLIN SODIUM,TAZOBACTAM SODIUM 4.5 G: 4; .5 INJECTION, POWDER, FOR SOLUTION INTRAVENOUS at 21:13

## 2019-01-01 RX ADMIN — GUAIFENESIN AND CODEINE PHOSPHATE 5 ML: 10; 100 LIQUID ORAL at 05:44

## 2019-01-01 RX ADMIN — QUETIAPINE FUMARATE 25 MG: 25 TABLET ORAL at 21:34

## 2019-01-01 RX ADMIN — DEXTROSE MONOHYDRATE 75 ML/HR: 5 INJECTION, SOLUTION INTRAVENOUS at 05:39

## 2019-01-01 RX ADMIN — IPRATROPIUM BROMIDE AND ALBUTEROL SULFATE 3 ML: .5; 3 SOLUTION RESPIRATORY (INHALATION) at 14:24

## 2019-01-01 RX ADMIN — Medication 5 ML: at 21:30

## 2019-01-01 RX ADMIN — Medication 5 ML: at 05:36

## 2019-01-01 RX ADMIN — MORPHINE SULFATE 2 MG: 2 INJECTION, SOLUTION INTRAMUSCULAR; INTRAVENOUS at 21:01

## 2019-01-01 RX ADMIN — SODIUM CHLORIDE, PRESERVATIVE FREE 3 ML: 5 INJECTION INTRAVENOUS at 18:02

## 2019-01-01 RX ADMIN — Medication 5 ML: at 12:09

## 2019-01-01 RX ADMIN — PETROLATUM: 420 OINTMENT TOPICAL at 08:58

## 2019-01-01 RX ADMIN — SODIUM CHLORIDE, PRESERVATIVE FREE 3 ML: 5 INJECTION INTRAVENOUS at 10:39

## 2019-01-01 RX ADMIN — IPRATROPIUM BROMIDE AND ALBUTEROL SULFATE 3 ML: .5; 3 SOLUTION RESPIRATORY (INHALATION) at 20:43

## 2019-01-01 RX ADMIN — SODIUM CHLORIDE, PRESERVATIVE FREE 3 ML: 5 INJECTION INTRAVENOUS at 21:01

## 2019-01-01 RX ADMIN — LISINOPRIL 5 MG: 5 TABLET ORAL at 08:42

## 2019-01-01 RX ADMIN — CEFTRIAXONE SODIUM 1 G: 1 INJECTION, POWDER, FOR SOLUTION INTRAMUSCULAR; INTRAVENOUS at 17:15

## 2019-01-01 RX ADMIN — HEPARIN SODIUM 5000 UNITS: 5000 INJECTION INTRAVENOUS; SUBCUTANEOUS at 21:25

## 2019-01-01 RX ADMIN — Medication 10 ML: at 05:52

## 2019-01-01 RX ADMIN — Medication 5 ML: at 05:00

## 2019-01-01 RX ADMIN — SODIUM CHLORIDE 1000 ML: 900 INJECTION, SOLUTION INTRAVENOUS at 18:14

## 2019-01-01 RX ADMIN — IPRATROPIUM BROMIDE AND ALBUTEROL SULFATE 3 ML: .5; 3 SOLUTION RESPIRATORY (INHALATION) at 09:02

## 2019-01-01 RX ADMIN — POTASSIUM CHLORIDE 20 MEQ: 200 INJECTION, SOLUTION INTRAVENOUS at 06:49

## 2019-01-01 RX ADMIN — HEPARIN SODIUM 5000 UNITS: 5000 INJECTION INTRAVENOUS; SUBCUTANEOUS at 13:21

## 2019-01-01 RX ADMIN — MORPHINE SULFATE 1 MG: 2 INJECTION, SOLUTION INTRAMUSCULAR; INTRAVENOUS at 20:25

## 2019-01-01 RX ADMIN — CARVEDILOL 3.12 MG: 3.12 TABLET, FILM COATED ORAL at 08:55

## 2019-01-01 RX ADMIN — Medication 5 ML: at 21:35

## 2019-01-01 RX ADMIN — QUETIAPINE FUMARATE 25 MG: 25 TABLET ORAL at 21:02

## 2019-01-01 RX ADMIN — HALOPERIDOL LACTATE 2 MG: 5 INJECTION INTRAMUSCULAR at 13:49

## 2019-01-01 RX ADMIN — AZITHROMYCIN 250 MG: 250 TABLET, FILM COATED ORAL at 09:47

## 2019-01-01 RX ADMIN — TUBERCULIN PURIFIED PROTEIN DERIVATIVE 5 UNITS: 5 INJECTION, SOLUTION INTRADERMAL at 05:15

## 2019-01-01 RX ADMIN — SODIUM CHLORIDE, PRESERVATIVE FREE 3 ML: 5 INJECTION INTRAVENOUS at 02:05

## 2019-01-01 RX ADMIN — CARVEDILOL 3.12 MG: 3.12 TABLET, FILM COATED ORAL at 17:07

## 2019-01-01 RX ADMIN — LORAZEPAM 1 MG: 2 INJECTION INTRAMUSCULAR at 07:31

## 2019-01-01 RX ADMIN — MORPHINE SULFATE 0.5 MG: 2 INJECTION, SOLUTION INTRAMUSCULAR; INTRAVENOUS at 06:29

## 2019-01-01 RX ADMIN — HALOPERIDOL LACTATE 2 MG: 5 INJECTION INTRAMUSCULAR at 12:21

## 2019-01-01 RX ADMIN — PETROLATUM: 420 OINTMENT TOPICAL at 13:25

## 2019-01-01 RX ADMIN — Medication 10 ML: at 14:11

## 2019-01-01 RX ADMIN — IPRATROPIUM BROMIDE AND ALBUTEROL SULFATE 3 ML: .5; 3 SOLUTION RESPIRATORY (INHALATION) at 01:23

## 2019-01-01 RX ADMIN — SODIUM CHLORIDE 100 ML/HR: 900 INJECTION, SOLUTION INTRAVENOUS at 23:33

## 2019-01-01 RX ADMIN — CARVEDILOL 3.12 MG: 3.12 TABLET, FILM COATED ORAL at 16:39

## 2019-01-01 RX ADMIN — PIPERACILLIN SODIUM,TAZOBACTAM SODIUM 4.5 G: 4; .5 INJECTION, POWDER, FOR SOLUTION INTRAVENOUS at 05:15

## 2019-01-01 RX ADMIN — MORPHINE SULFATE 0.5 MG: 2 INJECTION, SOLUTION INTRAMUSCULAR; INTRAVENOUS at 22:13

## 2019-01-01 RX ADMIN — IPRATROPIUM BROMIDE AND ALBUTEROL SULFATE 3 ML: .5; 3 SOLUTION RESPIRATORY (INHALATION) at 21:42

## 2019-01-01 RX ADMIN — PIPERACILLIN SODIUM,TAZOBACTAM SODIUM 4.5 G: 4; .5 INJECTION, POWDER, FOR SOLUTION INTRAVENOUS at 01:01

## 2019-01-01 RX ADMIN — Medication 5 ML: at 21:53

## 2019-01-01 RX ADMIN — HEPARIN SODIUM 5000 UNITS: 5000 INJECTION INTRAVENOUS; SUBCUTANEOUS at 05:36

## 2019-01-01 RX ADMIN — PIPERACILLIN SODIUM,TAZOBACTAM SODIUM 4.5 G: 4; .5 INJECTION, POWDER, FOR SOLUTION INTRAVENOUS at 13:54

## 2019-01-01 RX ADMIN — GUAIFENESIN AND CODEINE PHOSPHATE 5 ML: 10; 100 LIQUID ORAL at 14:21

## 2019-01-01 RX ADMIN — AZITHROMYCIN 250 MG: 250 TABLET, FILM COATED ORAL at 20:14

## 2019-01-01 RX ADMIN — DOCUSATE SODIUM 100 MG: 100 CAPSULE, LIQUID FILLED ORAL at 17:25

## 2019-01-01 RX ADMIN — Medication 10 ML: at 21:16

## 2019-01-01 RX ADMIN — LEVOTHYROXINE SODIUM 50 MCG: 50 TABLET ORAL at 05:21

## 2019-01-01 RX ADMIN — FUROSEMIDE 40 MG: 10 INJECTION, SOLUTION INTRAMUSCULAR; INTRAVENOUS at 21:28

## 2019-01-01 RX ADMIN — PETROLATUM: 420 OINTMENT TOPICAL at 11:53

## 2019-01-01 RX ADMIN — HEPARIN SODIUM 5000 UNITS: 5000 INJECTION INTRAVENOUS; SUBCUTANEOUS at 21:15

## 2019-01-01 RX ADMIN — DOCUSATE SODIUM 100 MG: 100 CAPSULE, LIQUID FILLED ORAL at 09:20

## 2019-01-01 RX ADMIN — ATORVASTATIN CALCIUM 10 MG: 10 TABLET, FILM COATED ORAL at 21:50

## 2019-01-01 RX ADMIN — HEPARIN SODIUM 5000 UNITS: 5000 INJECTION INTRAVENOUS; SUBCUTANEOUS at 12:06

## 2019-01-01 RX ADMIN — IPRATROPIUM BROMIDE AND ALBUTEROL SULFATE 3 ML: .5; 3 SOLUTION RESPIRATORY (INHALATION) at 14:43

## 2019-01-01 RX ADMIN — PIPERACILLIN SODIUM,TAZOBACTAM SODIUM 4.5 G: 4; .5 INJECTION, POWDER, FOR SOLUTION INTRAVENOUS at 21:53

## 2019-01-01 RX ADMIN — AMIODARONE HYDROCHLORIDE 100 MG: 200 TABLET ORAL at 08:23

## 2019-01-01 RX ADMIN — DEXTROSE MONOHYDRATE 75 ML/HR: 5 INJECTION, SOLUTION INTRAVENOUS at 19:10

## 2019-01-01 RX ADMIN — SENNOSIDES AND DOCUSATE SODIUM 1 TABLET: 8.6; 5 TABLET ORAL at 08:12

## 2019-01-01 RX ADMIN — HEPARIN SODIUM 5000 UNITS: 5000 INJECTION INTRAVENOUS; SUBCUTANEOUS at 22:49

## 2019-01-01 RX ADMIN — PETROLATUM: 420 OINTMENT TOPICAL at 08:26

## 2019-01-01 RX ADMIN — MORPHINE SULFATE 1 MG: 2 INJECTION, SOLUTION INTRAMUSCULAR; INTRAVENOUS at 19:37

## 2019-01-01 RX ADMIN — MORPHINE SULFATE 0.5 MG: 2 INJECTION, SOLUTION INTRAMUSCULAR; INTRAVENOUS at 03:55

## 2019-01-01 RX ADMIN — GLYCOPYRROLATE 0.2 MG: 0.2 INJECTION, SOLUTION INTRAMUSCULAR; INTRAVENOUS at 21:51

## 2019-01-01 RX ADMIN — SODIUM CHLORIDE, PRESERVATIVE FREE 3 ML: 5 INJECTION INTRAVENOUS at 18:00

## 2019-01-01 RX ADMIN — AZITHROMYCIN 250 MG: 250 TABLET, FILM COATED ORAL at 08:55

## 2019-01-01 RX ADMIN — LEVOTHYROXINE SODIUM 50 MCG: 50 TABLET ORAL at 05:57

## 2019-01-01 RX ADMIN — PIPERACILLIN SODIUM,TAZOBACTAM SODIUM 4.5 G: 4; .5 INJECTION, POWDER, FOR SOLUTION INTRAVENOUS at 13:43

## 2019-01-01 RX ADMIN — FUROSEMIDE 20 MG: 10 INJECTION, SOLUTION INTRAMUSCULAR; INTRAVENOUS at 20:36

## 2019-01-01 RX ADMIN — POTASSIUM CHLORIDE 20 MEQ: 20 TABLET, EXTENDED RELEASE ORAL at 08:12

## 2019-01-01 RX ADMIN — DOCUSATE SODIUM 100 MG: 100 CAPSULE, LIQUID FILLED ORAL at 17:14

## 2019-01-01 RX ADMIN — SODIUM CHLORIDE 401 ML: 900 INJECTION, SOLUTION INTRAVENOUS at 18:15

## 2019-01-01 RX ADMIN — MORPHINE SULFATE 1 MG: 2 INJECTION, SOLUTION INTRAMUSCULAR; INTRAVENOUS at 13:22

## 2019-01-01 RX ADMIN — HALOPERIDOL LACTATE 2 MG: 5 INJECTION INTRAMUSCULAR at 21:34

## 2019-01-01 RX ADMIN — HEPARIN SODIUM 5000 UNITS: 5000 INJECTION INTRAVENOUS; SUBCUTANEOUS at 12:07

## 2019-01-01 RX ADMIN — Medication 10 ML: at 21:26

## 2019-01-01 RX ADMIN — PIPERACILLIN SODIUM,TAZOBACTAM SODIUM 4.5 G: 4; .5 INJECTION, POWDER, FOR SOLUTION INTRAVENOUS at 15:11

## 2019-01-01 RX ADMIN — ATORVASTATIN CALCIUM 10 MG: 10 TABLET, FILM COATED ORAL at 21:41

## 2019-01-01 RX ADMIN — HEPARIN SODIUM 5000 UNITS: 5000 INJECTION INTRAVENOUS; SUBCUTANEOUS at 21:57

## 2019-01-01 RX ADMIN — Medication 5 ML: at 01:04

## 2019-01-01 RX ADMIN — LISINOPRIL 5 MG: 5 TABLET ORAL at 10:13

## 2019-01-01 RX ADMIN — HEPARIN SODIUM 5000 UNITS: 5000 INJECTION INTRAVENOUS; SUBCUTANEOUS at 13:08

## 2019-01-01 RX ADMIN — Medication 5 ML: at 05:18

## 2019-01-01 RX ADMIN — IPRATROPIUM BROMIDE AND ALBUTEROL SULFATE 3 ML: .5; 3 SOLUTION RESPIRATORY (INHALATION) at 01:45

## 2019-01-01 RX ADMIN — QUETIAPINE FUMARATE 25 MG: 25 TABLET ORAL at 21:05

## 2019-01-01 RX ADMIN — PIPERACILLIN SODIUM,TAZOBACTAM SODIUM 4.5 G: 4; .5 INJECTION, POWDER, FOR SOLUTION INTRAVENOUS at 14:19

## 2019-01-01 RX ADMIN — HEPARIN SODIUM 5000 UNITS: 5000 INJECTION INTRAVENOUS; SUBCUTANEOUS at 14:35

## 2019-01-01 RX ADMIN — Medication 10 ML: at 12:11

## 2019-01-01 RX ADMIN — CARVEDILOL 3.12 MG: 3.12 TABLET, FILM COATED ORAL at 09:20

## 2019-01-01 RX ADMIN — FUROSEMIDE 40 MG: 10 INJECTION, SOLUTION INTRAMUSCULAR; INTRAVENOUS at 18:07

## 2019-01-01 RX ADMIN — IPRATROPIUM BROMIDE AND ALBUTEROL SULFATE 3 ML: .5; 3 SOLUTION RESPIRATORY (INHALATION) at 02:12

## 2019-01-01 RX ADMIN — AZITHROMYCIN 250 MG: 250 TABLET, FILM COATED ORAL at 08:23

## 2019-01-01 RX ADMIN — Medication 5 ML: at 06:05

## 2019-01-01 RX ADMIN — GUAIFENESIN AND CODEINE PHOSPHATE 5 ML: 10; 100 LIQUID ORAL at 06:28

## 2019-01-01 RX ADMIN — DEXTROSE MONOHYDRATE 75 ML/HR: 5 INJECTION, SOLUTION INTRAVENOUS at 14:18

## 2019-01-01 RX ADMIN — MORPHINE SULFATE 1 MG: 2 INJECTION, SOLUTION INTRAMUSCULAR; INTRAVENOUS at 16:20

## 2019-01-01 RX ADMIN — IPRATROPIUM BROMIDE AND ALBUTEROL SULFATE 3 ML: .5; 3 SOLUTION RESPIRATORY (INHALATION) at 18:21

## 2019-01-01 RX ADMIN — POTASSIUM CHLORIDE 20 MEQ: 20 TABLET, EXTENDED RELEASE ORAL at 09:44

## 2019-01-01 RX ADMIN — DOCUSATE SODIUM 100 MG: 100 CAPSULE, LIQUID FILLED ORAL at 08:56

## 2019-01-01 RX ADMIN — MORPHINE SULFATE 2 MG: 2 INJECTION, SOLUTION INTRAMUSCULAR; INTRAVENOUS at 21:35

## 2019-01-01 RX ADMIN — IPRATROPIUM BROMIDE AND ALBUTEROL SULFATE 3 ML: .5; 3 SOLUTION RESPIRATORY (INHALATION) at 21:09

## 2019-01-01 RX ADMIN — HALOPERIDOL LACTATE 2 MG: 5 INJECTION INTRAMUSCULAR at 17:59

## 2019-05-13 PROBLEM — J18.9 PNEUMONIA: Status: ACTIVE | Noted: 2019-01-01
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
## 2019-05-13 NOTE — ED TRIAGE NOTES
Patient reports cough for 1 week, non productive. PCP sent to ED for CHF. Denies history of CHF. No swelling noted to lower extremities in triage.

## 2019-05-14 NOTE — PROGRESS NOTES
End of shift report given oncoming nurse , pt resting in bed, denies pain , call light within reach, bed locked and low position, pt respirations even and unlabored, no distress noted.

## 2019-05-14 NOTE — PROGRESS NOTES
Up in recliner  Coughing again  Patient appears tired   Placed back in bed with assist 2 persons  Cough non productive

## 2019-05-14 NOTE — PROGRESS NOTES
Patient is known to Case Management from previous admissions. She lives in her own home and her children take turns caring for her. One is in attendance at all times. Patient ambulates with a walker at home, but has had several falls. She has the [employer], a BSC, and a wheelchair at home. She went to rehab at [facility] at discharge in 9/2018, and has had [address] see her in the home since then. Case Management will continue to follow for discharge planning. Care Management Interventions PCP Verified by CM: Yes Transition of Care Consult (CM Consult): Discharge Planning Discharge Durable Medical Equipment: No 
Physical Therapy Consult: Yes Occupational Therapy Consult: Yes Speech Therapy Consult: No 
Current Support Network: Own Home, Has Personal Caregivers(Her children rotate staying with her in 24/7 attendance. ) Confirm Follow Up Transport: Family Plan discussed with Pt/Family/Caregiver: Yes Freedom of Choice Offered: Yes Discharge Location Discharge Placement: Home with home health

## 2019-05-14 NOTE — PROGRESS NOTES
Patient awakens easily  Smiles  Very hard of hearing   Breath sounds fairly clear  Cough congested  Right lower extremity red  Wound noted   Posey pad bed alarm activated

## 2019-05-14 NOTE — PROGRESS NOTES
Hospitalist Progress Note 2019 Admit Date: 2019  5:20 PM  
NAME: Galo Maya :  1928 MRN:  [id number] Attending: Thelda Cushing, MD 
PCP:  Elena Ariza MD 
 
SUBJECTIVE:  
Patient is a 79yo F with hx HFrEF 20%, AICD, mild dementia who presents with 4d worsening cough and shortness of breath, sent to ER from PCP office. : examined at bedside, family not present. Pt cooperative in exam but answers few questions. pleasantly demented. Developed some leg edema after fluids on presentation, but lungs clear and no respiratory distress. Still with wet cough. Pressures stable. Review of Systems negative with exception of pertinent positives noted above PHYSICAL EXAM  
 
Visit Vitals /58 Pulse 70 Temp 98.4 °F (36.9 °C) Resp 18 Ht 5' (1.524 m) Wt 47.3 kg (104 lb 4.4 oz) SpO2 91% Breastfeeding? No  
BMI 20.37 kg/m² Temp (24hrs), Av.5 °F (36.9 °C), Min:97.5 °F (36.4 °C), Max:99.4 °F (37.4 °C) Oxygen Therapy O2 Sat (%): 91 % (19 0744) Pulse via Oximetry: 74 beats per minute (19) O2 Device: Room air (19) No intake or output data in the 24 hours ending 19 09 General: Elderly Lungs:  CTA Bilaterally. Heart:  Regular rate and rhythm,  No murmur, rub, or gallop Abdomen: Soft, Non distended, Non tender, Positive bowel sounds Extremities: Trace pitting edema in ankles Neurologic:  pleasantly demented. XR CHEST PA LAT Final Result Impression: Right lung airspace opacities would raise suspicion for pneumonia in  
the proper clinical setting. ASSESSMENT Active Hospital Problems Diagnosis Date Noted  Pneumonia 2019  Mild dementia 2014  Chronic systolic [facility] failure [address]  Hyperlipidemia  S/P implantation of automatic cardioverter/defibrillator (AICD) 2012 Plan: 
 
CAP: continue rocephin, azithro. Stopped fluids in setting of severe CHF. Some new swelling in legs but no rales or hypoxia. Wheezing improved with BDs 
  
Continue home cardiac meds coreg and amiodarone. bp stable after initial hypotension. 
   
DVT Prophylaxis: HSQ Dispo: may need STR, ppd, pt pending Signed By: Pete Negrete MD   
 May 14, 2019

## 2019-05-14 NOTE — PROGRESS NOTES
Problem: Mobility Impaired (Adult and Pediatric) Goal: *Acute Goals and Plan of Care (Insert Text) Description STG: 
(1.)Ms. Nakul Reyes will move from supine to sit and sit to supine  with CONTACT GUARD ASSIST within 3 treatment day(s). (2.)Ms. Nakul Reyes will transfer from bed to chair and chair to bed with MINIMAL ASSIST using the least restrictive device within 3 treatment day(s). (3.)Ms. Nakul Reyes will ambulate with MINIMAL ASSIST for 50 feet with the least restrictive device within 3 treatment day(s). LTG: 
(1.)Ms. Nakul Reyes will move from supine to sit and sit to supine  in bed with SUPERVISION within 7 treatment day(s). (2.)Ms. Nakul Reyes will transfer from bed to chair and chair to bed with CONTACT GUARD ASSIST using the least restrictive device within 7 treatment day(s). (3.)Ms. Nakul Reyes will ambulate with CONTACT GUARD ASSIST for 150 feet with the least restrictive device within 7 treatment day(s). ________________________________________________________________________________________________ Outcome: Progressing Towards Goal 
  
PHYSICAL THERAPY: Initial Assessment and PM 5/14/2019 INPATIENT: PT Visit Days : 1 [place] / [place] / Product Type: Medicare /   
  
NAME/AGE/GENDER: Filipe Munson is a 80 y.o. female PRIMARY DIAGNOSIS: Pneumonia [J18.9] Pneumonia Pneumonia ICD-10: Treatment Diagnosis:  
 · Generalized Muscle Weakness (M62.81) · Other lack of cordination (R27.8) · Other abnormalities of gait and mobility (R26.89) · Repeated Falls (R29.6) · History of falling (Z91.81) Precaution/Allergies: 
Celebrex [celecoxib] and Other medication ASSESSMENT:  
Ms. Nakul Reyes presents supine in bed with daughter at bedside upon contact and is alert and agreeable to PT evaluation and treatment this afternoon. Pt is extremely hard of hearing and is not wearing her hearing aids this afternoon.  Pt lives with her daughter in a single level home with ramp entry. Pt ambulates with a walker and is independent with ADLs at baseline. Pt reports 4 recent falls. Pt denies any pain prior to mobility. Pt transitioned from supine to sitting EOB with modA requiring cues for leg management, hand placement, and proper technique. Pt demonstrated fair sitting balance. Pt completed STS transfer to RW with modA x2 requiring cues for pushing up from the bed rather than pulling up on the walker and safety of transfer. Pt amb 3 ft from bed to bedside chair with RW and modA x2 requiring cues for walker management, maintaining her COM over her ELLYN, and safety of ambulation. Daughter at bedside attempting to assist with upright posture however actually making pt more off balance. Pt amb at a slow, shuffled pace with a narrow base of support and decreased step clearance bilaterally. Pt demonstrated poor standing balance. Pt transitioned from standing to sitting in bedside chair with modA requiring cues for hand placement and safe descent. Pt was left sitting upright in bedside chair with family at bedside and all needs met and within reach. Yousif Almonte will benefit from skilled PT (medically necessary) to address decreased strength, decreased balance, decreased functional tolerance, decreased cardiopulmonary endurance affecting participation in basic ADLs and functional tasks. This section established at most recent assessment PROBLEM LIST (Impairments causing functional limitations): 1. Decreased Strength 2. Decreased Transfer Abilities 3. Decreased Ambulation Ability/Technique 4. Decreased Balance INTERVENTIONS PLANNED: (Benefits and precautions of physical therapy have been discussed with the patient.) 1. Balance Exercise 2. Bed Mobility 3. Gait Training 4. Home Exercise Program (HEP) 5. Neuromuscular Re-education/Strengthening 6. Therapeutic Activites 7. Therapeutic Exercise/Strengthening 8. Transfer Training TREATMENT PLAN: Frequency/Duration: 3 times a week for duration of hospital stay Rehabilitation Potential For Stated Goals: Fair REHAB RECOMMENDATIONS (at time of discharge pending progress):   
Placement: It is my opinion, based on this patient's performance to date, that Ms. Carey Harmon may benefit from intensive therapy at a [address] after discharge due to the functional deficits listed above that are likely to improve with skilled rehabilitation and concerns that he/she may be unsafe to be unsupervised at home due to decreased safety and independence with functional mobility . Equipment:  
? None at this time HISTORY:  
History of Present Injury/Illness (Reason for Referral): 
See H&P below. Patient is a 79yo F with hx HFrEF 20%, arythmia with AICD in place, mild dementia who presents with 4d worsening cough and shortness of breath. Seen in PCP office today, sent to ER for further eval. 
Wet cough, rare productive sputum. No fevers at home. No cp. Coughing spells with dyspnea but no sob at rest.  No leg edema, no orthopnea. Chronic debility and dementia, family desires to be able to bring her back home after discharge if possible. CXR in ER with evidence of RUL pneumonia. Mild leukocytosis. Initially significantly hypotensive and fluid bolus started, but pressure recovered significantly.  
 
Past Medical History/Comorbidities:  
Ms. Carey Harmon  has a past medical history of Anemia of other chronic disease (4/26/2014), Anxiety, Arrhythmia, CAD (coronary artery disease) (56M3), Chronic systolic heart failure ([address] Coagulation disorder ([address] Congestive heart failure ([address] (08/2007), Contusion of chest wall (4/29/2012), Falls (4/23/2014), Generalized OA (6/5/2015), Heart failure ([address] Hip pain (4/26/2014), History of skin cancer (1992), [place] (hard of hearing), Hygroma (4/23/2014), Hyperglycemia (4/29/2012), Hyperlipidemia, Hypertension, Hypotension (4/23/2014), Mild dementia (12/3/2014), Nausea & vomiting, Neutrophilic leukocytosis (3/16/6200), Osteoarthritis, Osteoporosis (12/3/2014), Postmenopausal bone loss, S/P cardiac pacemaker procedure (1/2011), S/P implantation of automatic cardioverter/defibrillator (AICD) (4/29/2012), S/P placement of cardiac pacemaker (4/29/2012), Shoulder fracture (1991), and Syncope (4/29/2012). Ms. Danie Chauhan  has a past surgical history that includes pr biopsy of skin lesion (1992); hx bladder suspension (1995?); hx juan and bso (1984); hx tonsillectomy (as a child); hx heart catheterization (2007); hx shoulder arthroscopy (1991); hx pacemaker (1/13/2011); and hx ptca (08/2007). Social History/Living Environment:  
Home Environment: Private residence # Steps to Enter: 0 Wheelchair Ramp: Yes One/Two Story Residence: One story Living Alone: No 
Support Systems: Child(ian) Patient Expects to be Discharged to[de-identified] Rehabilitation facility Current DME Used/Available at Home: Walker, rolling Prior Level of Function/Work/Activity: 
Ambulates with RW and independent with ADLs at baseline, 4 recent falls Number of Personal Factors/Comorbidities that affect the Plan of Care: 1-2: MODERATE COMPLEXITY EXAMINATION:  
Most Recent Physical Functioning:  
Gross Assessment: 
AROM: Generally decreased, functional 
Strength: Generally decreased, functional 
         
  
Posture: 
  
Balance: 
Sitting: Impaired Sitting - Static: Fair (occasional) Sitting - Dynamic: Fair (occasional) Standing: Impaired Standing - Static: Poor Standing - Dynamic : Poor Bed Mobility: 
Supine to Sit: Moderate assistance Wheelchair Mobility: 
  
Transfers: 
Sit to Stand: Moderate assistance;Assist x2 Stand to Sit: Moderate assistance Gait: 
  
Base of Support: Narrowed Speed/Divya: Slow;Shuffled Step Length: Right shortened;Left shortened Gait Abnormalities: Decreased step clearance Distance (ft): 3 Feet (ft) Assistive Device: Walker, rolling;Gait belt Ambulation - Level of Assistance: Moderate assistance;Assist x2 Interventions: Visual/Demos; Verbal cues; Safety awareness training;Manual cues; Tactile cues Body Structures Involved: 1. Eyes and Ears 2. Joints 3. Muscles Body Functions Affected: 1. Respiratory 2. Neuromusculoskeletal 
3. Movement Related Activities and Participation Affected: 1. Mobility 2. Domestic Life 3. Community, Social and [place] Number of elements that affect the Plan of Care: 3: MODERATE COMPLEXITY CLINICAL PRESENTATION:  
Presentation: Evolving clinical presentation with changing clinical characteristics: MODERATE COMPLEXITY CLINICAL DECISION MAKIN[address] [place] [address] AM-PAC 6 Clicks Basic Mobility Inpatient Short Form How much difficulty does the patient currently have. .. Unable A Lot A Little None 1. Turning over in bed (including adjusting bedclothes, sheets and blankets)? ? 1   ? 2   ? 3   ? 4  
2. Sitting down on and standing up from a chair with arms ( e.g., wheelchair, bedside commode, etc.)   ? 1   ? 2   ? 3   ? 4  
3. Moving from lying on back to sitting on the side of the bed?   ? 1   ? 2   ? 3   ? 4 How much help from another person does the patient currently need. .. Total A Lot A Little None 4. Moving to and from a bed to a chair (including a wheelchair)? ? 1   ? 2   ? 3   ? 4  
5. Need to walk in hospital room? ? 1   ? 2   ? 3   ? 4  
6. Climbing 3-5 steps with a railing? ? 1   ? 2   ? 3   ? 4  
© , Trustees of [address] [place] [address], under license to [employer]. All rights reserved Score:  Initial: 16 Most Recent: X (Date: -- ) Interpretation of Tool:  Represents activities that are increasingly more difficult (i.e. Bed mobility, Transfers, Gait). Medical Necessity:    
· Patient is expected to demonstrate progress in strength, balance, coordination and functional technique ·  to increase independence with bed mobility, transfers, ambulation, balance, and overall functional mobility. · . Reason for Services/Other Comments: 
· Patient continues to demonstrate capacity to improve independence with overall functional mobility, balance, gait, transfers, and bed mobility which will increase independence, decrease amount of assistance required from caregiver and increase safety · . Use of outcome tool(s) and clinical judgement create a POC that gives a: Questionable prediction of patient's progress: MODERATE COMPLEXITY  
  
 
 
 
TREATMENT:  
(In addition to Assessment/Re-Assessment sessions the following treatments were rendered) Pre-treatment Symptoms/Complaints:  None Pain: Initial:  
Pain Intensity 1: 0  Post Session:  0/10 Therapeutic Activity: (    12 minutes): Therapeutic activities including Bed transfers, Chair transfers, Ambulation on level ground and patient education regarding safety and walker management  to improve mobility, strength, balance and coordination. Required moderate Visual/Demos; Verbal cues; Safety awareness training;Manual cues; Tactile cues to promote static and dynamic balance in standing and promote coordination of bilateral, lower extremity(s). Braces/Orthotics/Lines/Etc:  
· O2 Device: Room air Treatment/Session Assessment:   
· Response to Treatment:  See above. · Interdisciplinary Collaboration:  
o Physical Therapist 
o Registered Nurse 
o SPT · After treatment position/precautions:  
o Up in chair 
o Bed alarm/tab alert on 
o Bed/Chair-wheels locked 
o Bed in low position 
o Call light within reach 
o Family at bedside · Compliance with Program/Exercises: Will assess as treatment progresses · Recommendations/Intent for next treatment session: \"Next visit will focus on advancements to more challenging activities and reduction in assistance provided\". Total Treatment Duration: PT Patient Time In/Time Out Time In: 3182 Time Out: 1532 Melodie ARIZA

## 2019-05-14 NOTE — PROGRESS NOTES
Nutrition Reason for assessment: Referral received from nursing admission Malnutrition Screening Tool Recently Lost Weight Without Trying: Unsure Eating Poorly Due to Decreased Appetite: Yes Assessment:  
Diet: DIET REGULAR 
DIET NUTRITIONAL SUPPLEMENTS All Meals; [employer] PMH: 
Dementia, CHF, CAD, Subdural Hematoma Food/Nutrition Patient History:  The patient was sleeping during RD rounds. There was no family present in room. Patient would not rouse easily and fell right back to sleep. There was not nutrition history provided. Weight history in the EMR cannot be verified as accurate due to unknown weight source (pt stated vs estimated vs measured). Weight Loss Metrics 5/14/2019 5/13/2019 5/13/2019 5/13/2019 Today's Wt 104 lb 4.4 oz  103 lb BMI  20.37 kg/m2  20.12 kg/m2 Weight Loss Metrics 4/22/2019 3/18/2019 12/17/2018 10/23/2018 Today's Wt    103 lb BMI 20.12 kg/m2 20.12 kg/m2 20.12 kg/m2 20.12 kg/m2 According to the EMR the patient's weight has remained stable. Anthropometrics:Height: 5' (152.4 cm),  Weight: 47.3 kg (104 lb 4.4 oz), Weight Source: Lift, Body mass index is 20.37 kg/m². BMI class of underweight for age >71. Macronutrient needs: EER:  8555-4208 kcal /day (30-35 kcal/kg listed BW of 47.3 kg) EPR:  47-59 grams protein/day (1-1.25 grams/kg listed BW) Intake/Comparative Standards: Average intake for past 1 recorded meal(s): 10%. This potentially meets ~<25% of kcal and ~<25% of protein needs. Nutrition Diagnosis: Predicted inadequate oral intake related to confusion and advanced age as evidenced by patient with dementia and lethargy Intervention: 
Meals and snacks: Continue current diet. Nutrition Supplement Therapy: Continue Ensure [employer] TID Nutrition Discharge Plan: Too soon to be determined. Robert Calvillo Friday, [address], [address] [place] [address],  [phone]

## 2019-05-14 NOTE — ROUTINE PROCESS
TRANSFER - OUT REPORT: 
 
Verbal report given to TALHA mcintyre(name) on Sumit Hernández  being transferred to 8th floor(unit) for routine progression of care Report consisted of patients Situation, Background, Assessment and  
Recommendations(SBAR). Information from the following report(s) SBAR, Kardex, ED Summary, Intake/Output, MAR and Recent Results was reviewed with the receiving nurse. Lines:  
Peripheral IV 05/13/19 Left Antecubital (Active) Site Assessment Clean, dry, & intact 5/13/2019  6:09 PM  
Phlebitis Assessment 0 5/13/2019  6:09 PM  
Infiltration Assessment 0 5/13/2019  6:09 PM  
Dressing Status Clean, dry, & intact 5/13/2019  6:09 PM  
   
Peripheral IV 05/13/19 Left Forearm (Active) Site Assessment Clean, dry, & intact 5/13/2019  6:09 PM  
Phlebitis Assessment 0 5/13/2019  6:09 PM  
Infiltration Assessment 0 5/13/2019  6:09 PM  
Dressing Status Clean, dry, & intact 5/13/2019  6:09 PM  
  
 
Opportunity for questions and clarification was provided. Patient transported with: 
 transport.

## 2019-05-14 NOTE — PROGRESS NOTES
Pt received schedule medications, call light within reach , bed on low position and locked, pt unable to make needs known,  , no discomfort noted.

## 2019-05-14 NOTE — PROGRESS NOTES
Pt received schedule medications, call light within reach , bed on low position and locked, no discomfort noted.

## 2019-05-14 NOTE — PROGRESS NOTES
Patient with increase in coughing  Bilateral wheezing  Dr Jose A Llanos notified and breathing treatment in progress

## 2019-05-14 NOTE — PROGRESS NOTES
Pt arrived to the unit  via stretcher accompany by transporter, admitted to room  828,A/Ox1, calm and pleasant, pt oriented to the unit , call light within reach , bed in low position  and locked, respirations even and unlabored, bilateral lower edema noted, IV site 20G on LFA  and 22 G on left arm,denies pain , no distress noted, dual skin assessment  With Ilya Stevenson RN, right leg wound,  soft spot under great right toe, soft spot right ankle noted, bilateral lower extreme bruises and discoloration areas noted, bilateral forearm discoloration areas noted.

## 2019-05-14 NOTE — WOUND CARE
Patient seen for old skin tear/trauma to RLE, is pale pink and draining serosanguinous. Measured 3x1.5x0.1 cm. Silicone foam in use, placed new dressing at this time. Will add aquaphor ointment to dry skin on both lower legs. Also recommend heel boots as patient has dementia and is higher risk. Sacrum clear. Primary nurse present and updated. No family at bedside.

## 2019-05-15 NOTE — PROGRESS NOTES
Pt resting in bed. Pt anxious and asking family to take her home. Aislinn pad in place. Door open will monitor.

## 2019-05-15 NOTE — PROGRESS NOTES
LMSW follow up with pt and daughter at bedside. Pt with previous rehab stay at [facility] and [place]. Daughter is requesting referral to [facility] for rehab at discharge as this is her preferred SNF. She has SNF list and will consider other options if [facility] is not available. SNF referral placed today. Will follow up.

## 2019-05-15 NOTE — PROGRESS NOTES
Hospitalist Progress Note 5/15/2019 Admit Date: 2019  5:20 PM  
NAME: Juliano Kirkland :  1928 MRN:  [id number] Attending: Zaki Severino MD 
PCP:  Fer Garza MD 
 
SUBJECTIVE:  
Patient is a 81yo F with hx HFrEF 20%, AICD, mild dementia who presents with 4d worsening cough and shortness of breath, sent to ER from PCP office. 5/15: examined at bedside, family not present. Pt cooperative in exam but again answers few questions. pleasantly demented. Still with coughing spells associated with some hypoxia, placed on 2L NC now. Coughing was improved yesterday with scheduled BDs. Review of Systems negative with exception of pertinent positives noted above PHYSICAL EXAM  
 
Visit Vitals /66 Pulse 74 Temp 98.6 °F (37 °C) Resp 18 Ht 5' (1.524 m) Wt 47.5 kg (104 lb 12.8 oz) SpO2 (P) 96% Breastfeeding? No  
BMI 20.47 kg/m² Temp (24hrs), Av.1 °F (36.7 °C), Min:97.9 °F (36.6 °C), Max:98.6 °F (37 °C) Oxygen Therapy O2 Sat (%): (P) 96 % (05/15/19 0810) Pulse via Oximetry: (P) 72 beats per minute (05/15/19 0810) O2 Device: (P) Nasal cannula (05/15/19 0810) Intake/Output Summary (Last 24 hours) at 5/15/2019 0820 Last data filed at 2019 1815 Gross per 24 hour Intake 360 ml Output  Net 360 ml General: Elderly Lungs:  CTA Bilaterally, coughing paroxysms Heart:  Regular rate and rhythm,  No murmur, rub, or gallop Abdomen: Soft, Non distended, Non tender, Positive bowel sounds Extremities: Trace pitting edema in ankles Neurologic:  pleasantly demented. XR CHEST PA LAT Final Result Impression: Right lung airspace opacities would raise suspicion for pneumonia in  
the proper clinical setting. ASSESSMENT Active Hospital Problems Diagnosis Date Noted  Pneumonia 2019  Mild dementia 2014  Chronic systolic heart failure ([address]  Hyperlipidemia  S/P implantation of automatic cardioverter/defibrillator (AICD) 04/29/2012 Plan: 
 
CAP: continue rocephin, azithro. Stopped fluids in setting of severe CHF. Some new swelling in legs but no rales Wheezing improved with BDs - schedule and PRN 
  
Continue home cardiac meds coreg and amiodarone. bp stable after initial hypotension. 
   
DVT Prophylaxis: HSQ Dispo: may benefit from STR but family wanting to go home with [facility] Signed By: Speedy Bright MD   
 May 15, 2019

## 2019-05-15 NOTE — PROGRESS NOTES
Problem: Pneumonia: Day 2 Goal: Off Pathway (Use only if patient is Off Pathway) Outcome: Progressing Towards Goal 
Goal: Activity/Safety Outcome: Progressing Towards Goal

## 2019-05-15 NOTE — PROGRESS NOTES
Problem: Self Care Deficits Care Plan (Adult) Goal: *Acute Goals and Plan of Care (Insert Text) Description 1. Pt will toilet with min assist  
2. Pt will complete functional mobility for ADLs with min assist 
3. Pt will complete upper body bathing and dressing with min assist using AE as needed 4. Pt will complete grooming and hygiene with set up 5. Pt will complete HEP with min cues to promote increased BUE strength and functional use for ADLs 6. Pt will tolerate 23 minutes functional activity with min or fewer rest breaks to promote increased endurance for ADLs 7. Pt will complete bed mobility with CGA in prep for ADLs Timeframe: 7 days Outcome: Progressing Towards Goal 
  
OCCUPATIONAL THERAPY: Initial Assessment and Daily Note 5/15/2019 INPATIENT: OT Visit Days: 1 [place] / [place] B / Product Type: Medicare /  
  
NAME/AGE/GENDER: Steve Rascon is a 80 y.o. female PRIMARY DIAGNOSIS:  Pneumonia [J18.9] Pneumonia Pneumonia ICD-10: Treatment Diagnosis:  
 Generalized Muscle Weakness (M62.81) Precautions/Allergies: 
  falls Celebrex [celecoxib] and Other medication ASSESSMENT:  
Ms. Carine Carrillo was admitted with cough, SOB, and weakness d/t pna. Per daughter and son in law at bedside, pt lives with them and requires assistance for bathing, dressing, and toileting d/t dementia, and CGA for mobility using RW for fall prevention. Family reports 4 falls within the last 6 months. This session, pt presented very weak and deconditioned with deficits in cognition, mobility, strength, endurance, and balance impacting ADLs. Pt required mod assistance for transfer from supine to sitting, total assistance to don socks. Mod assistance for grooming and hygiene at EOB with CGA-min A for sitting balance. Mod X2 to stand, max X2 for transfer to chair.  BUE strength is generally decreased, grossly 3+ to 4-/5. Pt continually coughing throughout session, SOB and required increased O2 to 2L to maintain SpO2 above 90%. Pt is below her functional baseline and would benefit from skilled OT services to address deficits, recommend d/t to SNF. This section established at most recent assessment PROBLEM LIST (Impairments causing functional limitations): 
Decreased Strength Decreased ADL/Functional Activities Decreased Transfer Abilities Decreased Balance Decreased Activity Tolerance Increased Fatigue Increased Shortness of Breath Decreased Cognition INTERVENTIONS PLANNED: (Benefits and precautions of occupational therapy have been discussed with the patient.) Activities of daily living training Adaptive equipment training Balance training Therapeutic activity Therapeutic exercise TREATMENT PLAN: Frequency/Duration: Follow patient 3 times/ week to address above goals. Rehabilitation Potential For Stated Goals: Good REHAB RECOMMENDATIONS (at time of discharge pending progress):   
Placement: It is my opinion, based on this patient's performance to date, that Ms. Patti Love may benefit from intensive therapy at a [address] [place] [address] after discharge due to the functional deficits listed above that are likely to improve with skilled rehabilitation and concerns that he/she may be unsafe to be unsupervised at home due to inability to complete ADLs or mobility for ADLs . Equipment:  
None at this time OCCUPATIONAL PROFILE AND HISTORY:  
History of Present Injury/Illness (Reason for Referral): 
See H&P Past Medical History/Comorbidities:  
Ms. Patti Love  has a past medical history of Anemia of other chronic disease (4/26/2014), Anxiety, Arrhythmia, CAD (coronary artery disease) (03A2), Chronic systolic heart failure ([facility] [address] Coagulation disorder ([facility] [address] Congestive heart failure ([facility] [address] (08/2007), Contusion of chest wall (4/29/2012), Falls (4/23/2014), Generalized OA (6/5/2015), Heart failure [facility]), Hip pain (4/26/2014), History of skin cancer (1992), [place] (hard of hearing), Hygroma (4/23/2014), Hyperglycemia (4/29/2012), Hyperlipidemia, Hypertension, Hypotension (4/23/2014), Mild dementia (12/3/2014), Nausea & vomiting, Neutrophilic leukocytosis (6/59/8981), Osteoarthritis, Osteoporosis (12/3/2014), Postmenopausal bone loss, S/P cardiac pacemaker procedure (1/2011), S/P implantation of automatic cardioverter/defibrillator (AICD) (4/29/2012), S/P placement of cardiac pacemaker (4/29/2012), Shoulder fracture (1991), and Syncope (4/29/2012). Ms. Praveen Recinos  has a past surgical history that includes pr biopsy of skin lesion (1992); hx bladder suspension (1995?); hx juan and bso (1984); hx tonsillectomy (as a child); hx heart catheterization (2007); hx shoulder arthroscopy (1991); hx pacemaker (1/13/2011); and hx ptca (08/2007). Social History/Living Environment:  
Home Environment: Private residence # Steps to Enter: 0 Wheelchair Ramp: Yes One/Two Story Residence: One story Living Alone: No 
Support Systems: Child(ian), Family member(s) Patient Expects to be Discharged to[de-identified] Rehabilitation facility Current DME Used/Available at Home: Walker, rolling, Shower chair, Grab bars, Raised toilet seat, Commode, bedside, Wheelchair Tub or Shower Type: Shower Prior Level of Function/Work/Activity: 
Lives w/ family, assisted w/ ADLs d/t dementia, RW for mobility w/ assistance for transfers Number of Personal Factors/Comorbidities that affect the Plan of Care: Expanded review of therapy/medical records (1-2):  MODERATE COMPLEXITY ASSESSMENT OF OCCUPATIONAL PERFORMANCE[de-identified]  
Activities of Daily Living:  
Basic ADLs (From Assessment) Complex ADLs (From Assessment) Feeding: Minimum assistance Oral Facial Hygiene/Grooming: Minimum assistance, Moderate assistance Bathing: Maximum assistance Upper Body Dressing: Maximum assistance Lower Body Dressing: Total assistance Toileting: Total assistance Instrumental ADL Meal Preparation: Total assistance Homemaking: Total assistance Medication Management: Total assistance Financial Management: Total assistance Grooming/Bathing/Dressing Activities of Daily Living Grooming Grooming Assistance: Moderate assistance Cognitive Retraining Safety/Judgement: Fall prevention Lower Body Dressing Assistance Socks: Total assistance (dependent) Bed/Mat Mobility Supine to Sit: Moderate assistance Sit to Stand: Moderate assistance;Assist x2 Stand to Sit: Maximum assistance Bed to Chair: Maximum assistance;Assist x2 Scooting: Minimum assistance Most Recent Physical Functioning:  
Gross Assessment: 
AROM: Generally decreased, functional 
Strength: Generally decreased, functional 
Coordination: Generally decreased, functional 
         
  
Posture: 
  
Balance: 
Sitting: Impaired Sitting - Static: Fair (occasional) Sitting - Dynamic: Fair (occasional) Standing: Impaired Standing - Static: Poor Standing - Dynamic : Poor Bed Mobility: 
Supine to Sit: Moderate assistance Scooting: Minimum assistance Wheelchair Mobility: 
  
Transfers: 
Sit to Stand: Moderate assistance;Assist x2 Stand to Sit: Maximum assistance Bed to Chair: Maximum assistance;Assist x2 Patient Vitals for the past 6 hrs: 
 BP SpO2 O2 Flow Rate (L/min) Pulse 05/15/19 0810 112/66 96 % 1 l/min 74  
05/15/19 1106   2 l/min   
05/15/19 1209 105/63 95 %  72 Mental Status Neurologic State: Alert Orientation Level: Oriented to person Cognition: Decreased attention/concentration, Follows commands Perception: Appears intact Perseveration: No perseveration noted Safety/Judgement: Fall prevention Physical Skills Involved: 
Balance Strength Activity Tolerance Cognitive Skills Affected (resulting in the inability to perform in a timely and safe manner): Executive Function Short Term Recall Long Term Memory Sustained Attention Divided Attention Psychosocial Skills Affected: 
Habits/Routines Number of elements that affect the Plan of Care: 3-5:  MODERATE COMPLEXITY CLINICAL DECISION MAKING:  
[facility] AM-PAC? ?6 Clicks? Daily Activity Inpatient Short Form How much help from another person does the patient currently need. .. Total A Lot A Little None 1. Putting on and taking off regular lower body clothing? ? 1   ? 2   ? 3   ? 4  
2. Bathing (including washing, rinsing, drying)? ? 1   ? 2   ? 3   ? 4  
3. Toileting, which includes using toilet, bedpan or urinal?   ? 1   ? 2   ? 3   ? 4  
4. Putting on and taking off regular upper body clothing? ? 1   ? 2   ? 3   ? 4  
5. Taking care of personal grooming such as brushing teeth? ? 1   ? 2   ? 3   ? 4  
6. Eating meals? ? 1   ? 2   ? 3   ? 4  
© 2007, Trustees of [facility], under license to [employer]. All rights reserved Score:  Initial: 14 Most Recent: X (Date: -- ) Interpretation of Tool:  Represents activities that are increasingly more difficult (i.e. Bed mobility, Transfers, Gait). Medical Necessity:    
Patient is expected to demonstrate progress in strength, balance and functional technique 
 to increase independence with ADLs Mabstaci Jean Reason for Services/Other Comments: 
Patient continues to require present interventions due to patient's inability to complete ADLs Mabeline Sink Use of outcome tool(s) and clinical judgement create a POC that gives a: MODERATE COMPLEXITY  
 
 
 
TREATMENT:  
(In addition to Assessment/Re-Assessment sessions the following treatments were rendered) Pre-treatment Symptoms/Complaints:   
Pain: Initial:  
Pain Intensity 1: 0  Post Session:  0 Self Care: (9 min): Procedure(s) (per grid) utilized to improve and/or restore self-care/home management as related to dressing and grooming.  Required moderate visual and verbal cueing to facilitate activities of daily living skills and compensatory activities. Braces/Orthotics/Lines/Etc:  
O2 Device: Nasal cannula Treatment/Session Assessment:   
Response to Treatment:  fatigue Interdisciplinary Collaboration: Occupational Therapist 
Registered Nurse After treatment position/precautions:  
Up in chair Bed/Chair-wheels locked Call light within reach RN notified Family at bedside Nurse at bedside Compliance with Program/Exercises: Compliant most of the time. Recommendations/Intent for next treatment session: \"Next visit will focus on advancements to more challenging activities and reduction in assistance provided\". Total Treatment Duration: OT Patient Time In/Time Out Time In: 1924 Time Out: 1036 Abraham Reddy, OT

## 2019-05-15 NOTE — PROGRESS NOTES
Patient is resting in bed. Assessment completed. Respirations are even and unlabored. Patient denies any pain. No distress at this time. Bed is low, locked and call light within reach.

## 2019-05-16 NOTE — PROGRESS NOTES
Pt. In chair. Pt stable, drowsy. Family at bedside. Breathing unlabored. Breath sounds diminished. Call light within reach, safety measures in place.

## 2019-05-16 NOTE — PROGRESS NOTES
Hospitalist Progress Note 2019 Admit Date: 2019  5:20 PM  
NAME: Roberto Bridges :  1928 MRN:  [id number] Attending: Ervin Spann MD 
PCP:  Juan Selby MD 
 
SUBJECTIVE:  
Patient is a 79yo F with hx HFrEF 20%, AICD, mild dementia who presents with 4d worsening cough and shortness of breath, sent to ER from PCP office. Interval History (): patient examined at bedside. Patient slept poorly overnight, she is confused and moaning at bedside. Family present at bedside, state that Ashutosh Reeves has been on a slow decline with her dementia. \" Review of Systems unable to be obtained due to clinical condition. PHYSICAL EXAM  
 
Visit Vitals /56 Pulse 73 Temp 98.6 °F (37 °C) Resp 20 Ht 5' (1.524 m) Wt 45.9 kg (101 lb 3.2 oz) SpO2 95% Breastfeeding? No  
BMI 19.76 kg/m² Temp (24hrs), Av.6 °F (37 °C), Min:97.9 °F (36.6 °C), Max:99.9 °F (37.7 °C) Oxygen Therapy O2 Sat (%): 95 % (19 1627) Pulse via Oximetry: 69 beats per minute (19 0758) O2 Device: Nasal cannula (19 1405) O2 Flow Rate (L/min): 2 l/min (19 1405) FIO2 (%): 28 % (05/15/19 1945) Intake/Output Summary (Last 24 hours) at 2019 1758 Last data filed at 2019 1044 Gross per 24 hour Intake 240 ml Output  Net 240 ml General: Elderly, awake but not alert or oriented HEENT: +JVD to angle of mandible b/l Lungs:  CTA Bilaterally, productive cough Heart:  Regular rate and rhythm,  No murmur, rub, or gallop Abdomen: Soft, Non distended, Non tender, Positive bowel sounds Extremities: Trace pitting edema in ankles Neurologic:  No focal deficits, not alert or oriented XR CHEST PA LAT Final Result Impression: Right lung airspace opacities would raise suspicion for pneumonia in  
the proper clinical setting. ASSESSMENT Active Hospital Problems Diagnosis Date Noted  Pneumonia 2019  Mild dementia 12/03/2014  Chronic systolic heart failure ([facility] [address]  Hyperlipidemia  S/P implantation of automatic cardioverter/defibrillator (AICD) 04/29/2012 Plan: # Acute hypoxic respiratory failure with productive cough, likely CAP vs acute decompensated systolic heart failure 
- repeat TTE 
- will add scheduled Lasix - currently on empiric azithro/Rocephin 
- strict I's/O's - DuoNebs scheduled # Acute delirium superimposed with known dementia - will start on scheduled Seroquel at nighttime 
- shades open during the day, encourage family at bedside 
- avoid sedatives and opiates 
- avoid restraints 
- family photos/trinkets at bedside 
- avoid anticholinergics and antichistamines # History of Afib 
- continue with amiodarone # History of hypothyroidism 
- continue with levothyroxine 
  
F/E/N: no fluids, replete electrolytes as needed, cardiac diet Ppx: heparin SQ for VTE Code Status: DNR Disposition: pending workup as above. STR vs home with [facility] at discharge. All questions answered. Signed By: Harshad Gomez DO May 16, 2019

## 2019-05-16 NOTE — PROGRESS NOTES
Patient with increase coughing. Respiratory therapist notified. Patient is getting breathing treatment. Will monitor.

## 2019-05-16 NOTE — PROGRESS NOTES
Problem: Pneumonia: Day 3 Goal: Off Pathway (Use only if patient is Off Pathway) Outcome: Progressing Towards Goal 
Goal: Activity/Safety Outcome: Progressing Towards Goal

## 2019-05-16 NOTE — PROGRESS NOTES
Pt. In bed resting. Breathing unlabored with diminished breath sounds and congestion. Call light within reach, bed in low locked position, safety measures in place. Door open.

## 2019-05-16 NOTE — PROGRESS NOTES
Pt. In bed with family visitors at bedside. Consult to wound care. Patient stable with unlabored breathing. Clear breath sounds. Call light within reach, bed low and locked, safety measures.

## 2019-05-16 NOTE — PROGRESS NOTES
Pt resting in bed with family at bedside. Moaning incoherently, confused. Breath sounds diminished. Breathing unlabored. Call light within reach, bed low and locked.

## 2019-05-16 NOTE — PROGRESS NOTES
Problem: Mobility Impaired (Adult and Pediatric) Goal: *Acute Goals and Plan of Care (Insert Text) Description STG: 
(1.)Ms. Olivia Webster will move from supine to sit and sit to supine  with CONTACT GUARD ASSIST within 3 treatment day(s). (2.)Ms. Olivia Webster will transfer from bed to chair and chair to bed with MINIMAL ASSIST using the least restrictive device within 3 treatment day(s). (3.)Ms. Olivia Webster will ambulate with MINIMAL ASSIST for 50 feet with the least restrictive device within 3 treatment day(s). LTG: 
(1.)Ms. Olivia Webster will move from supine to sit and sit to supine  in bed with SUPERVISION within 7 treatment day(s). (2.)Ms. Olivia Webster will transfer from bed to chair and chair to bed with CONTACT GUARD ASSIST using the least restrictive device within 7 treatment day(s). (3.)Ms. Olivia Webster will ambulate with CONTACT GUARD ASSIST for 150 feet with the least restrictive device within 7 treatment day(s). ________________________________________________________________________________________________ Outcome: Progressing Towards Goal 
  
PHYSICAL THERAPY: Daily Note and AM 5/16/2019 INPATIENT: PT Visit Days : 2 [place] / Product Type: Medicare /   
  
NAME/AGE/GENDER: Lina Mendez is a 80 y.o. female PRIMARY DIAGNOSIS: Pneumonia [J18.9] Pneumonia Pneumonia ICD-10: Treatment Diagnosis:  
 · Generalized Muscle Weakness (M62.81) · Other lack of cordination (R27.8) · Other abnormalities of gait and mobility (R26.89) · Repeated Falls (R29.6) · History of falling (Z91.81) Precaution/Allergies: 
Celebrex [celecoxib] and Other medication ASSESSMENT:  
Ms. Olivia Webster is extremely hard of hearing and is not wearing her hearing aids this afternoon. Pt lives with her daughter in a single level home with ramp entry. Pt ambulates with a walker and is independent with ADLs at baseline. Pt reports 4 recent falls. 5/16/19: Pt presents supine in bed with family at bedside upon contact and is drowsy, confused, and agreeable to PT treatment this morning. Pt denies any pain prior to mobility. Pt transitioned from supine to sitting EOB with maxA x2 requiring cues for leg management, hand placement, and proper technique. Pt demonstrated fair sitting balance. Pt completed STS transfer to RW with maxA x2 requiring cues for proper technique and safety of transfer. Pt amb 3 ft from bed to bedside chair with RW and maxA x2 requiring cues for walker management, foot placement, and safety of ambulation. Pt amb at a slow pace with a narrow base of support, shuffled steps, and decreased step clearance bilaterally. Pt demonstrated poor standing balance requiring constant support. Pt transitioned from standing to sitting in bedside chair with modA x2 requiring cues for safe descent and hand placement. Pt was left sitting upright in bedside chair with family at bedside and all needs met and within reach. No progress made towards therapy goals this session. This section established at most recent assessment PROBLEM LIST (Impairments causing functional limitations): 1. Decreased Strength 2. Decreased Transfer Abilities 3. Decreased Ambulation Ability/Technique 4. Decreased Balance INTERVENTIONS PLANNED: (Benefits and precautions of physical therapy have been discussed with the patient.) 1. Balance Exercise 2. Bed Mobility 3. Gait Training 4. Home Exercise Program (HEP) 5. Neuromuscular Re-education/Strengthening 6. Therapeutic Activites 7. Therapeutic Exercise/Strengthening 8. Transfer Training TREATMENT PLAN: Frequency/Duration: 3 times a week for duration of hospital stay Rehabilitation Potential For Stated Goals: Fair REHAB RECOMMENDATIONS (at time of discharge pending progress):   
Placement:  
It is my opinion, based on this patient's performance to date, that Ms. Nakul Reyes may benefit from intensive therapy at a [address] [place] [address] after discharge due to the functional deficits listed above that are likely to improve with skilled rehabilitation and concerns that he/she may be unsafe to be unsupervised at home due to decreased safety and independence with functional mobility . Equipment:  
? None at this time HISTORY:  
History of Present Injury/Illness (Reason for Referral): 
See H&P below. Patient is a 81yo F with hx HFrEF 20%, arythmia with AICD in place, mild dementia who presents with 4d worsening cough and shortness of breath. Seen in PCP office today, sent to ER for further eval. 
Wet cough, rare productive sputum. No fevers at home. No cp. Coughing spells with dyspnea but no sob at rest.  No leg edema, no orthopnea. Chronic debility and dementia, family desires to be able to bring her back home after discharge if possible. CXR in ER with evidence of RUL pneumonia. Mild leukocytosis. Initially significantly hypotensive and fluid bolus started, but pressure recovered significantly.  
 
Past Medical History/Comorbidities:  
Ms. Nakul Reyes  has a past medical history of Anemia of other chronic disease (4/26/2014), Anxiety, Arrhythmia, CAD (coronary artery disease) (60F5), Chronic systolic heart failure ([address] Coagulation disorder ([address] Congestive heart failure ([address] (08/2007), Contusion of chest wall (4/29/2012), Falls (4/23/2014), Generalized OA (6/5/2015), Heart failure ([address] Hip pain (4/26/2014), History of skin cancer (1992), [place] (hard of hearing), Hygroma (4/23/2014), Hyperglycemia (4/29/2012), Hyperlipidemia, Hypertension, Hypotension (4/23/2014), Mild dementia (12/3/2014), Nausea & vomiting, Neutrophilic leukocytosis (5/34/6360), Osteoarthritis, Osteoporosis (12/3/2014), Postmenopausal bone loss, S/P cardiac pacemaker procedure (1/2011), S/P implantation of automatic cardioverter/defibrillator (AICD) (4/29/2012), S/P placement of cardiac pacemaker (4/29/2012), Shoulder fracture (1991), and Syncope (4/29/2012). Ms. Alessandra Oconnor  has a past surgical history that includes pr biopsy of skin lesion (1992); hx bladder suspension (1995?); hx juan and bso (1984); hx tonsillectomy (as a child); hx heart catheterization (2007); hx shoulder arthroscopy (1991); hx pacemaker (1/13/2011); and hx ptca (08/2007). Social History/Living Environment:  
Home Environment: Private residence # Steps to Enter: 0 Wheelchair Ramp: Yes One/Two Story Residence: One story Living Alone: No 
Support Systems: Child(ian), Family member(s) Patient Expects to be Discharged to[de-identified] Rehabilitation facility Current DME Used/Available at Home: Walker, rolling, Shower chair, Grab bars, Raised toilet seat, Commode, bedside, Wheelchair Tub or Shower Type: Shower Prior Level of Function/Work/Activity: 
Ambulates with RW and independent with ADLs at baseline, 4 recent falls Number of Personal Factors/Comorbidities that affect the Plan of Care: 1-2: MODERATE COMPLEXITY EXAMINATION:  
Most Recent Physical Functioning:  
Gross Assessment: 
AROM: Generally decreased, functional 
Strength: Generally decreased, functional 
         
  
Posture: 
  
Balance: 
Sitting: Impaired Sitting - Static: Fair (occasional) Sitting - Dynamic: Fair (occasional) Standing: Impaired Standing - Static: Poor Standing - Dynamic : Poor Bed Mobility: 
Supine to Sit: Maximum assistance;Assist x2 Wheelchair Mobility: 
  
Transfers: 
Sit to Stand: Maximum assistance;Assist x2 Stand to Sit: Moderate assistance;Assist x2 Gait: 
  
Base of Support: Narrowed Speed/Divya: Slow;Shuffled Step Length: Right shortened;Left shortened Stance: Right increased; Left increased Gait Abnormalities: Decreased step clearance; Path deviations Distance (ft): 3 Feet (ft) Assistive Device: Walker, rolling;Gait belt Ambulation - Level of Assistance: Maximum assistance;Assist x2 
 Interventions: Visual/Demos; Verbal cues; Tactile cues; Safety awareness training;Manual cues Body Structures Involved: 1. Eyes and Ears 2. Joints 3. Muscles Body Functions Affected: 1. Respiratory 2. Neuromusculoskeletal 
3. Movement Related Activities and Participation Affected: 1. Mobility 2. Domestic Life 3. Community, Social and [place] Number of elements that affect the Plan of Care: 3: MODERATE COMPLEXITY CLINICAL PRESENTATION:  
Presentation: Evolving clinical presentation with changing clinical characteristics: MODERATE COMPLEXITY CLINICAL DECISION MAKING:  
[facility] AM-PAC 6 Clicks Basic Mobility Inpatient Short Form How much difficulty does the patient currently have. .. Unable A Lot A Little None 1. Turning over in bed (including adjusting bedclothes, sheets and blankets)? ? 1   ? 2   ? 3   ? 4  
2. Sitting down on and standing up from a chair with arms ( e.g., wheelchair, bedside commode, etc.)   ? 1   ? 2   ? 3   ? 4  
3. Moving from lying on back to sitting on the side of the bed?   ? 1   ? 2   ? 3   ? 4 How much help from another person does the patient currently need. .. Total A Lot A Little None 4. Moving to and from a bed to a chair (including a wheelchair)? ? 1   ? 2   ? 3   ? 4  
5. Need to walk in hospital room? ? 1   ? 2   ? 3   ? 4  
6. Climbing 3-5 steps with a railing? ? 1   ? 2   ? 3   ? 4  
© 2007, Trustees of [facility] [employer], under license to [employer]. All rights reserved Score:  Initial: 16 Most Recent: X (Date: -- ) Interpretation of Tool:  Represents activities that are increasingly more difficult (i.e. Bed mobility, Transfers, Gait). Medical Necessity:    
· Patient is expected to demonstrate progress in strength, balance, coordination and functional technique ·  to increase independence with bed mobility, transfers, ambulation, balance, and overall functional mobility. · . Reason for Services/Other Comments: · Patient continues to demonstrate capacity to improve independence with overall functional mobility, balance, gait, transfers, and bed mobility which will increase independence, decrease amount of assistance required from caregiver and increase safety · . Use of outcome tool(s) and clinical judgement create a POC that gives a: Questionable prediction of patient's progress: MODERATE COMPLEXITY  
  
 
 
 
TREATMENT:  
(In addition to Assessment/Re-Assessment sessions the following treatments were rendered) Pre-treatment Symptoms/Complaints:  None Pain: Initial:  
Pain Intensity 1: 0  Post Session:  0/10 Therapeutic Activity: (    13 minutes): Therapeutic activities including Bed transfers, Chair transfers, Ambulation on level ground, standing balance training, and patient education regarding safety and walker management  to improve mobility, strength, balance and coordination. Required maximal Visual/Demos; Verbal cues; Tactile cues; Safety awareness training;Manual cues to promote static and dynamic balance in standing and promote coordination of bilateral, lower extremity(s). Braces/Orthotics/Lines/Etc:  
· O2 Device: Room air Treatment/Session Assessment:   
· Response to Treatment:  See above. · Interdisciplinary Collaboration:  
o Physical Therapist 
o Registered Nurse 
o SPT · After treatment position/precautions:  
o Up in chair 
o Bed alarm/tab alert on 
o Bed/Chair-wheels locked 
o Bed in low position 
o Call light within reach 
o RN notified 
o Family at bedside · Compliance with Program/Exercises: Compliant most of the time · Recommendations/Intent for next treatment session: \"Next visit will focus on advancements to more challenging activities and reduction in assistance provided\". Total Treatment Duration: PT Patient Time In/Time Out Time In: 1127 Time Out: 1140 Drea Palencia SPT

## 2019-05-17 NOTE — PROGRESS NOTES
Pt condition has declined and we now have consults for Palliative Care and [facility]. Will follow plan of care and assist further as appropriate.

## 2019-05-17 NOTE — PROGRESS NOTES
Patient is resting quietly in bed with family at bedside. Respirations are even and unlabored. O2 at 2lpm NC. No distress at this time. Safety measures in place.

## 2019-05-17 NOTE — HOSPICE
I called the patient's daughter, Mariam Jordan, and left a VM requesting that she call me back. Thank you for this referral, 
 
Rocio Davis RN, BSN Community Nurse Liaison [facility] [phone]

## 2019-05-17 NOTE — PROGRESS NOTES
Problem: Pneumonia: Day 4 Goal: Off Pathway (Use only if patient is Off Pathway) Outcome: Progressing Towards Goal

## 2019-05-17 NOTE — PROGRESS NOTES
Problem: Dysphagia (Adult) Goal: *Acute Goals and Plan of Care (Insert Text) Description LTG: Patient will tolerate least restrictive diet without overt signs or symptoms of airway compromise. STG: Patient will tolerate puree diet and honey thick liquids by tsp only without overt signs or symptoms of airway compromise. STG: Patient will participate in modified barium swallow study as clinically indicated. Outcome: Progressing Towards Goal 
SPEECH LANGUAGE PATHOLOGY: BEDSIDE SWALLOW NOTE: Initial Assessment NAME/AGE/GENDER: Juliano Kirkland is a 80 y.o. female DATE: 5/17/2019 PRIMARY DIAGNOSIS: Pneumonia [J18.9] ICD-10: Treatment Diagnosis: R13.12 Oropharyngeal Dysphagia. INTERDISCIPLINARY COLLABORATION: Registered Nurse and Physician PRECAUTIONS/ALLERGIES: Celebrex [celecoxib] and Other medication ASSESSMENT:  
Based on the objective data described below, Ms. Dylan Chavez presents with overt s/s of airway compromise with thin, nectar, and honey thick liquids. Daughter reports patient typically consumes regular diet at home, but inconsistent reports of dysphagia with liquids. Difficult to obtain consistent report from daughter. Patient was drowsy during evaluation, requiring moderate encouragement to maintain alertness for participation. Mild amount of oral residue from previous trials of solid textures presented by daughter prior to SLP's arrival. Immediate strong cough on thin liquids via tsp and cup sip. Mild-moderate amount of anterior spillage with cup trials. Cup trials deferred on with nectar and honey thick liquids secondary to poor oral containment. Immediate cough with nectar by straw, and inconsistent immediate cough with honey by straw. Honey via tsp tolerated without overt s/s of airway compromise. Tongue pumping and delayed oral clearing with puree trials, but single swallows palpated per bolus. Recommend puree/honey by tsp only. Medications crushed in puree.  High risk of aspiration due to impaired level of alertness. Educated daughter on recommendations and need for increased alertness during po intake. She verbalized understanding of recommendations and states good understanding of patient's overall prognosis. Patient will benefit from skilled intervention to address the below impairments. ?????? ? ? This section established at most recent assessment?????????? 
PROBLEM LIST (Impairments causing functional limitations): Oropharyngeal dysphagia REHABILITATION POTENTIAL FOR STATED GOALS: Fair PLAN OF CARE:  
Patient will benefit from skilled intervention to address the following impairments. RECOMMENDATIONS AND PLANNED INTERVENTIONS (Benefits and precautions of therapy have been discussed with the patient.): 
PO:  Pureed Liquids:  honey MEDICATIONS: 
Crushed in puree ASPIRATION PRECAUTIONS: 
Slow rate of intake Small bites/sips Upright at 90 degrees during meal 
COMPENSATORY STRATEGIES/MODIFICATIONS INCLUDING: 
Spoon OTHER RECOMMENDATIONS (including follow up treatment recommendations):  
Family training/education Patient education RECOMMENDED DIET MODIFICATIONS DISCUSSED WITH: 
Hospitalist 
Nursing Family Patient FREQUENCY/DURATION: Continue to follow patient 3 times a week for duration of hospital stay to address above goals. RECOMMENDED REHABILITATION/EQUIPMENT: (at time of discharge pending progress): Due to the probability of continued deficits (see above) this patient will likely need continued skilled speech therapy after discharge. SUBJECTIVE:  
Patient drowsy. Difficult to for her to keep head in upright position. Daughter at bedside History of Present Injury/Illness: Ms. Alison Gaspar  has a past medical history of Anemia of other chronic disease (4/26/2014), Anxiety, Arrhythmia, CAD (coronary artery disease) (87G0), Chronic systolic heart failure ([facility] Coagulation disorder ([facility] Congestive heart failure ([facility] (08/2007), Contusion of chest wall (4/29/2012), Falls (4/23/2014), Generalized OA (6/5/2015), Heart failure ([facility] Hip pain (4/26/2014), History of skin cancer (1992), [place] (hard of hearing), Hygroma (4/23/2014), Hyperglycemia (4/29/2012), Hyperlipidemia, Hypertension, Hypotension (4/23/2014), Mild dementia (12/3/2014), Nausea & vomiting, Neutrophilic leukocytosis (7/91/9998), Osteoarthritis, Osteoporosis (12/3/2014), Postmenopausal bone loss, S/P cardiac pacemaker procedure (1/2011), S/P implantation of automatic cardioverter/defibrillator (AICD) (4/29/2012), S/P placement of cardiac pacemaker (4/29/2012), Shoulder fracture (1991), and Syncope (4/29/2012). .  She also  has a past surgical history that includes pr biopsy of skin lesion (1992); hx bladder suspension (1995?); hx juan and bso (1984); hx tonsillectomy (as a child); hx heart catheterization (2007); hx shoulder arthroscopy (1991); hx pacemaker (1/13/2011); and hx ptca (08/2007). Present Symptoms:   
Pain Scale 1: Numeric (0 - 10) Pain Intensity 1: 0 Current Medications: No current facility-administered medications on file prior to encounter. Current Outpatient Medications on File Prior to Encounter Medication Sig Dispense Refill  
 atorvastatin (LIPITOR) 10 mg tablet As directed 90 Tab 3  
 carvedilol (COREG) 3.125 mg tablet As directed 180 Tab 3  
 levothyroxine (SYNTHROID) 50 mcg tablet Take 1 Tab by mouth Daily (before breakfast). 90 Tab 3  
 amiodarone (PACERONE) 100 mg tablet Take 1 Tab by mouth daily. 30 Tab 0  
 potassium chloride (K-DUR, KLOR-CON) 20 mEq tablet Take 1 Tab by mouth daily. 90 Tab 3  
 calcium-vitamin D (OYSTER SHELL) 500 mg(1,250mg) -200 unit per tablet Take 1 Tab by mouth three (3) times daily (with meals). 90 Tab 0  
 acetaminophen (TYLENOL) 500 mg tablet Take 500 mg by mouth every six (6) hours as needed for Pain.     
 nitroglycerin (NITROSTAT) 0.4 mg SL tablet 1 Tab by SubLINGual route every five (5) minutes as needed. (Patient taking differently: 1 Tab by SubLINGual route every five (5) minutes as needed for Chest Pain. PRN Chest pain) 25 Tab 3  
 docusate sodium (COLACE) 100 mg capsule Take 1 Cap by mouth two (2) times a day. Indications: prevent constipation 60 Cap 11  
 albuterol (PROVENTIL HFA, VENTOLIN HFA, PROAIR HFA) 90 mcg/actuation inhaler Take 1 Puff by inhalation every four (4) hours as needed for Wheezing. 1 Inhaler 1 Current Dietary Status:   
 Regular/thin 
Social History/Home Situation:   
Home Environment: Private residence # Steps to Enter: 0 Wheelchair Ramp: Yes One/Two Story Residence: One story Living Alone: No 
Support Systems: Child(ian), Family member(s) Patient Expects to be Discharged to[de-identified] Rehabilitation facility Current DME Used/Available at Home: Walker, rolling, Shower chair, Grab bars, Raised toilet seat, Commode, bedside, Wheelchair Tub or Shower Type: Shower OBJECTIVE:  
Respiratory Status:  Nasal cannula  2 l/min Oral Motor Structure/Speech:  Oral-Motor Structure/Motor Speech Labial: Decreased rate, Decreased seal 
Lingual: Decreased rate, Decreased strength, Incoordinated Cognitive and Communication Status: 
Neurologic State: Drowsy; Eyes open to stimulus Orientation Level: Disoriented X4 Cognition: Decreased command following;Decreased attention/concentration Perception: Cues to maintain midline in sitting Perseveration: No perseveration noted Safety/Judgement: Fall prevention;Decreased insight into deficits BEDSIDE SWALLOW EVALUATION Oral Assessment: 
Oral Assessment Labial: Decreased rate;Decreased seal 
Lingual: Decreased rate;Decreased strength; Incoordinated P.O. Trials: 
Patient Position: Upright in bed The patient was given tsp-small bite amounts of the following:  
Consistency Presented: Thin liquid; Nectar thick liquid;Honey thick liquid;Puree How Presented: SLP-fed/presented;Cup/sip;Spoon;Straw;Successive swallows ORAL PHASE: 
Bolus Acceptance: Impaired Bolus Formation/Control: Impaired Propulsion: Delayed (# of seconds); Tongue pumping;Discoordination Type of Impairment: Delayed; Anterior;Spillage;Poor; Incomplete Oral Residue: 10-50% of bolus PHARYNGEAL PHASE: 
Initiation of Swallow: Delayed (# of seconds) Laryngeal Elevation: Decreased;Weak Aspiration Signs/Symptoms: Weak cough Vocal Quality: Low volume(Dysarthric) Pharyngeal Phase Characteristics: Poor endurance;Easily fatigued OTHER OBSERVATIONS: 
Rate/bite size: Impaired Endurance:  Impaired Comments: Tool Used: Dysphagia Outcome and Severity Scale (TEZ) Score Comments Normal Diet  ? 7 With no strategies or extra time needed Functional Swallow  ? 6 May have mild oral or pharyngeal delay Mild Dysphagia ? 5 Which may require one diet consistency restricted (those who demonstrate penetration which is entirely cleared on MBS would be included) Mild-Moderate Dysphagia  ? 4 With 1-2 diet consistencies restricted Moderate Dysphagia  ? 3 With 2 or more diet consistencies restricted Moderately Severe Dysphagia  ? 2 With partial PO strategies (trials with ST only) Severe Dysphagia  ? 1 With inability to tolerate any PO safely Score:  Initial: 3 Most Recent: X (Date: --) Interpretation of Tool: The Dysphagia Outcome and Severity Scale (TEZ) is a simple, easy-to-use, 7-point scale developed to systematically rate the functional severity of dysphagia based on objective assessment and make recommendations for diet level, independence level, and type of nutrition. [place] / [place] B / Product Type: Medicare /  
 
TREATMENT:  
 (In addition to Assessment/Re-Assessment sessions the following treatments were rendered) Assessment/Reassessment only, no treatment provided today MODALITIES:  
  
  
  
  
  
  
  
  
  
  
    
  
  
  
  
  
  
  
  
   
 
 ORAL MOTOR  EXERCISES: 
  
  
  
  
  
  
  
  
  
  
  
  
  
  
  
  
  
  
  
  
  
  
  
  
  
  
    
  
  
  
  
  
  
  
  
  
  
  
  
  
  
  
  
  
  
  
  
  
  
  
  
  
   
 
LARYNGEAL / PHARYNGEAL EXERCISES: 
  
  
  
  
  
  
  
  
  
  
  
  
  
  
  
  
  
  
  
  
  
    
  
  
  
  
  
  
  
  
  
  
  
  
  
  
  
  
  
  
  
   
 
                                __________________________________________________________________________________________________ Safety: After treatment position/precautions: 
RN notified Family at bedside Upright in Bed Treatment Assessment: . Progression/Medical Necessity:  
Patient is expected to demonstrate progress in swallow strength, swallow timeliness, swallow function, diet tolerance and swallow safety 
 to improve swallow safety, work toward diet advancement and decrease aspiration risk [place] Compliance with Program/Exercises: Will assess as treatment progresses Reason for Continuation of Services/Other Comments: 
Patient continues to require skilled intervention due to dysphagia [place] Recommendations/Intent for next treatment session: \"Treatment next visit will focus on diet tolerance, po trials for potential advancement \". Total Treatment Duration: 
Time In: 1243 Time Out: 8582 [address] CCC-SLP

## 2019-05-17 NOTE — PROGRESS NOTES
Problem: Mobility Impaired (Adult and Pediatric) Goal: *Acute Goals and Plan of Care (Insert Text) Description STG: 
(1.)Ms. Santosh Phipps will move from supine to sit and sit to supine  with CONTACT GUARD ASSIST within 3 treatment day(s). (2.)Ms. Santosh Phipps will transfer from bed to chair and chair to bed with MINIMAL ASSIST using the least restrictive device within 3 treatment day(s). (3.)Ms. Santosh Phipps will ambulate with MINIMAL ASSIST for 50 feet with the least restrictive device within 3 treatment day(s). LTG: 
(1.)Ms. Santosh Phipps will move from supine to sit and sit to supine  in bed with SUPERVISION within 7 treatment day(s). (2.)Ms. Santosh Phipps will transfer from bed to chair and chair to bed with CONTACT GUARD ASSIST using the least restrictive device within 7 treatment day(s). (3.)Ms. Santosh Phipps will ambulate with CONTACT GUARD ASSIST for 150 feet with the least restrictive device within 7 treatment day(s). ________________________________________________________________________________________________ Outcome: Progressing Towards Goal 
  
PHYSICAL THERAPY: Daily Note and PM 5/17/2019 INPATIENT: PT Visit Days : 3 [place] / [place] B / Product Type: Medicare /   
  
NAME/AGE/GENDER: Ophelia Padron is a 80 y.o. female PRIMARY DIAGNOSIS: Pneumonia [J18.9] Pneumonia Pneumonia ICD-10: Treatment Diagnosis:  
 · Generalized Muscle Weakness (M62.81) · Other lack of cordination (R27.8) · Other abnormalities of gait and mobility (R26.89) · Repeated Falls (R29.6) · History of falling (Z91.81) Precaution/Allergies: 
Celebrex [celecoxib] and Other medication ASSESSMENT:  
Ms. Santosh Phipps is extremely hard of hearing and is not wearing her hearing aids this afternoon. Pt lives with her daughter in a single level home with ramp entry. Pt ambulates with a walker and is independent with ADLs at baseline. Pt reports 4 recent falls. 5/17/19: Pt presents supine in bed with daughter at bedside upon contact and is drowsy, confused, and agreeable to PT treatment this morning. Pt denies any pain prior to mobility. Pt transitioned from supine to sitting EOB with max A requiring cues for leg management, hand placement, and proper technique. Pt demonstrated fair static sitting balance and poor dynamic sitting balance. Pt completed therapeutic strengthening exercises EOB requiring cues for proper technique and to stay on task. Pt transitioned from sitting EOB to supine with max A requiring cues for leg management, hand placement, and proper technique. Pt completed therapeutic strengthening exercises supine in bed requiring cues for proper technique. Pt was left supine in bed with daughter at bedside and all needs met and within reach. No progress made towards therapy goals this session. This section established at most recent assessment PROBLEM LIST (Impairments causing functional limitations): 1. Decreased Strength 2. Decreased Transfer Abilities 3. Decreased Ambulation Ability/Technique 4. Decreased Balance INTERVENTIONS PLANNED: (Benefits and precautions of physical therapy have been discussed with the patient.) 1. Balance Exercise 2. Bed Mobility 3. Gait Training 4. Home Exercise Program (HEP) 5. Neuromuscular Re-education/Strengthening 6. Therapeutic Activites 7. Therapeutic Exercise/Strengthening 8. Transfer Training TREATMENT PLAN: Frequency/Duration: 3 times a week for duration of hospital stay Rehabilitation Potential For Stated Goals: Fair REHAB RECOMMENDATIONS (at time of discharge pending progress):   
Placement: It is my opinion, based on this patient's performance to date, that Ms. Darron Denise may benefit from intensive therapy at a [address] [place] [address] after discharge due to the functional deficits listed above that are likely to improve with skilled rehabilitation and concerns that he/she may be unsafe to be unsupervised at home due to decreased safety and independence with functional mobility . Equipment:  
? None at this time HISTORY:  
History of Present Injury/Illness (Reason for Referral): 
See H&P below. Patient is a 81yo F with hx HFrEF 20%, arythmia with AICD in place, mild dementia who presents with 4d worsening cough and shortness of breath. Seen in PCP office today, sent to ER for further eval. 
Wet cough, rare productive sputum. No fevers at home. No cp. Coughing spells with dyspnea but no sob at rest.  No leg edema, no orthopnea. Chronic debility and dementia, family desires to be able to bring her back home after discharge if possible. CXR in ER with evidence of RUL pneumonia. Mild leukocytosis. Initially significantly hypotensive and fluid bolus started, but pressure recovered significantly. Past Medical History/Comorbidities:  
Ms. Darron Denise  has a past medical history of Anemia of other chronic disease (4/26/2014), Anxiety, Arrhythmia, CAD (coronary artery disease) (91J5), Chronic systolic heart failure ([address] Coagulation disorder ([address] Congestive heart failure ([address] (08/2007), Contusion of chest wall (4/29/2012), Falls (4/23/2014), Generalized OA (6/5/2015), Heart failure ([address] Hip pain (4/26/2014), History of skin cancer (1992), [place] (hard of hearing), Hygroma (4/23/2014), Hyperglycemia (4/29/2012), Hyperlipidemia, Hypertension, Hypotension (4/23/2014), Mild dementia (12/3/2014), Nausea & vomiting, Neutrophilic leukocytosis (6/83/9482), Osteoarthritis, Osteoporosis (12/3/2014), Postmenopausal bone loss, S/P cardiac pacemaker procedure (1/2011), S/P implantation of automatic cardioverter/defibrillator (AICD) (4/29/2012), S/P placement of cardiac pacemaker (4/29/2012), Shoulder fracture (1991), and Syncope (4/29/2012).   Ms. Darron Denise  has a past surgical history that includes pr biopsy of skin lesion (1992); hx bladder suspension (1995?); hx juan and bso (1984); hx tonsillectomy (as a child); hx heart catheterization (2007); hx shoulder arthroscopy (1991); hx pacemaker (1/13/2011); and hx ptca (08/2007). Social History/Living Environment:  
Home Environment: Private residence # Steps to Enter: 0 Wheelchair Ramp: Yes One/Two Story Residence: One story Living Alone: No 
Support Systems: Child(ian), Family member(s) Patient Expects to be Discharged to[de-identified] Rehabilitation facility Current DME Used/Available at Home: Walker, rolling, Shower chair, Grab bars, Raised toilet seat, Commode, bedside, Wheelchair Tub or Shower Type: Shower Prior Level of Function/Work/Activity: 
Ambulates with RW and independent with ADLs at baseline, 4 recent falls Number of Personal Factors/Comorbidities that affect the Plan of Care: 1-2: MODERATE COMPLEXITY EXAMINATION:  
Most Recent Physical Functioning:  
Gross Assessment: 
AROM: Generally decreased, functional 
Strength: Generally decreased, functional 
         
  
Posture: 
  
Balance: 
Sitting: Impaired Sitting - Static: Fair (occasional) Sitting - Dynamic: Poor (constant support) Bed Mobility: 
Supine to Sit: Maximum assistance Sit to Supine: Maximum assistance Wheelchair Mobility: 
  
Transfers: 
  
Gait: 
  
   
  
Body Structures Involved: 1. Eyes and Ears 2. Joints 3. Muscles Body Functions Affected: 1. Respiratory 2. Neuromusculoskeletal 
3. Movement Related Activities and Participation Affected: 1. Mobility 2. Domestic Life 3. Community, Social and [place] Number of elements that affect the Plan of Care: 3: MODERATE COMPLEXITY CLINICAL PRESENTATION:  
Presentation: Evolving clinical presentation with changing clinical characteristics: MODERATE COMPLEXITY CLINICAL DECISION MAKING:  
[employer] AM-PAC 6 Clicks Basic Mobility Inpatient Short Form How much difficulty does the patient currently have. .. Unable A Lot A Little None 1.   Turning over in bed (including adjusting bedclothes, sheets and blankets)? ? 1   ? 2   ? 3   ? 4  
2. Sitting down on and standing up from a chair with arms ( e.g., wheelchair, bedside commode, etc.)   ? 1   ? 2   ? 3   ? 4  
3. Moving from lying on back to sitting on the side of the bed?   ? 1   ? 2   ? 3   ? 4 How much help from another person does the patient currently need. .. Total A Lot A Little None 4. Moving to and from a bed to a chair (including a wheelchair)? ? 1   ? 2   ? 3   ? 4  
5. Need to walk in hospital room? ? 1   ? 2   ? 3   ? 4  
6. Climbing 3-5 steps with a railing? ? 1   ? 2   ? 3   ? 4  
© 2007, Trustees of [facility] [employer], under license to [employer]. All rights reserved Score:  Initial: 16 Most Recent: X (Date: -- ) Interpretation of Tool:  Represents activities that are increasingly more difficult (i.e. Bed mobility, Transfers, Gait). Medical Necessity:    
· Patient is expected to demonstrate progress in strength, balance, coordination and functional technique ·  to increase independence with bed mobility, transfers, ambulation, balance, and overall functional mobility. · . Reason for Services/Other Comments: 
· Patient continues to demonstrate capacity to improve independence with overall functional mobility, balance, gait, transfers, and bed mobility which will increase independence, decrease amount of assistance required from caregiver and increase safety · . Use of outcome tool(s) and clinical judgement create a POC that gives a: Questionable prediction of patient's progress: MODERATE COMPLEXITY  
  
 
 
 
TREATMENT:  
(In addition to Assessment/Re-Assessment sessions the following treatments were rendered) Pre-treatment Symptoms/Complaints:  None Pain: Initial:  
Pain Intensity 1: 0  Post Session:  0/10 Therapeutic Activity: (    10 minutes):   Therapeutic activities including bed mobility, trunk control, static and dynamic sitting balance training, and patient education regarding safety to improve mobility, strength, balance and coordination. Required maximal verbal cues, visual cues, tactile cues, manual cues, and safety awareness training   to promote static and dynamic balance in sitting and promote coordination of bilateral, lower extremity(s). Therapeutic Exercise: (  13 minutes):  Exercises per grid below to improve mobility, strength, balance and coordination. Required moderate assistance and  visual, verbal, manual and tactile cues to promote proper body posture and promote proper body mechanics. Progressed resistance, range, repetitions and complexity of movement as indicated. Date: 
5/17/19 Date: 
 Date: Activity/Exercise Parameters Parameters Parameters LAQ 15 x B Marches 15 x B Heel slides 15 x B Ankle pumps 20 x B Hip abduction 15 x B Braces/Orthotics/Lines/Etc:  
· Nasal cannula Treatment/Session Assessment:   
· Response to Treatment:  See above. · Interdisciplinary Collaboration:  
o Physical Therapist 
o Registered Nurse 
o SPT · After treatment position/precautions:  
o Supine in bed 
o Bed/Chair-wheels locked 
o Bed in low position 
o Call light within reach 
o Family at bedside · Compliance with Program/Exercises: Compliant most of the time · Recommendations/Intent for next treatment session: \"Next visit will focus on advancements to more challenging activities and reduction in assistance provided\". Total Treatment Duration: PT Patient Time In/Time Out Time In: 0831 Time Out: [address]

## 2019-05-17 NOTE — PROGRESS NOTES
[facility] has declined pt referral for placement and daughter has been updated. She remains hopeful that they will get a bed for pt next week and plans to visit [facility] to follow up. Also discussed that we would make a referral to [facility] as an option for pt and encouraged daughter to visit. Will follow up.

## 2019-05-17 NOTE — PROGRESS NOTES
Pt resting in bed talking with brother cough better at this time. Call light in reach, pt remains confused. No distress noted at this time. Door open will monitor.
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
## 2019-05-17 NOTE — PROGRESS NOTES
Pt resting in bed with eyes closed. Pt drowsy. Pt will awakened when spoken to. Pt confused. Pt with poor po intake at this time. Pt encouraged to call for assistance if needed call light in reach, door open will monitor. Family at bedside.

## 2019-05-17 NOTE — PROGRESS NOTES
Hospitalist Progress Note 2019 Admit Date: 2019  5:20 PM  
NAME: Lina Mendez :  1928 MRN:  [id number] Attending: Sarah Beth Zamora MD 
PCP:  Ryan Chavira MD 
 
SUBJECTIVE:  
Patient is a 81yo F with hx HFrEF 20%, AICD, mild dementia who presents with 4d worsening cough and shortness of breath, sent to ER from PCP office. Interval History (): patient examined at bedside. Still with worsening mental status overall, noted coughing during assisted feedings at bedside this morning. Speech evaluation recommending restricting patient's diet due to very high risk for aspiration. PT evaluated patient with no progress made towards goals of care. At bedside, patient will moan incoherently with intermittent wet coughing, will sometimes follow simple directions with redirect. She is not in obvious distress and she shakes her head when asked if she is in pain. Review of Systems unable to be obtained due to clinical condition. PHYSICAL EXAM  
 
Visit Vitals /49 Pulse 69 Temp 98.4 °F (36.9 °C) Resp 20 Ht 5' (1.524 m) Wt 46.2 kg (101 lb 12.8 oz) SpO2 93% Breastfeeding? No  
BMI 19.88 kg/m² Temp (24hrs), Av °F (36.7 °C), Min:97.3 °F (36.3 °C), Max:98.6 °F (37 °C) Oxygen Therapy O2 Sat (%): 93 % (19 1625) Pulse via Oximetry: 69 beats per minute (19 1439) O2 Device: Nasal cannula (19 1439) O2 Flow Rate (L/min): 2 l/min (19 1439) FIO2 (%): 28 % (05/15/19 1945) Intake/Output Summary (Last 24 hours) at 2019 1639 Last data filed at 2019 1415 Gross per 24 hour Intake 215 ml Output  Net 215 ml General: Elderly, awake but not alert or oriented x3 Lungs:  CTA Bilaterally, productive cough Heart:  Regular rate and rhythm,  No murmur, rub, or gallop Abdomen: Soft, Non distended, Non tender, Positive bowel sounds Extremities: Trace pitting edema in ankles Neurologic:  No focal deficits, not alert or oriented XR CHEST PA LAT Final Result Impression: Right lung airspace opacities would raise suspicion for pneumonia in  
the proper clinical setting. XR CHEST SNGL V    (Results Pending) ASSESSMENT Active Hospital Problems Diagnosis Date Noted  Pneumonia 05/13/2019  Mild dementia 12/03/2014  Chronic systolic heart failure [facility]  Hyperlipidemia  S/P implantation of automatic cardioverter/defibrillator (AICD) 04/29/2012 Plan: # Acute metabolic encephalopathy with adult failure to thrive - patient likely aspirating and unable to control own secretions - speech therapy consult, change to honey-thickened diet - patient not advancing towards goals with PT 
- palliative/Hospice consults for goals of care discussion 
- hold Lasix 
- repeat CXR in the morning 
- scheduled Seroquel 25 mg po qHS 
- avoid sedative/hypnotics, anticholinergics/antihistamines 
- avoid restraints if possible # Acute hypoxic respiratory failure with productive cough, likely CAP vs acute decompensated systolic heart failure 
- repeat TTE showing systolic dysfunction 
- holding Lasix 
- discontinue azithro/Rocephin 
- switch to Zosyn to cover aspiration pneumonia 
- strict I's/O's - DuoNebs scheduled  
- repeat CXR in the morning # History of Afib 
- continue with amiodarone # History of hypothyroidism 
- continue with levothyroxine 
  
F/E/N: no fluids, replete electrolytes as needed, cardiac diet Ppx: heparin SQ for VTE Code Status: DNR Disposition: pending workup as above. All questions answered. Signed By: Colten Maier DO May 17, 2019 elev b/p 150/80's

## 2019-05-17 NOTE — PROGRESS NOTES
Pt resting in bed with eyes closed. Pt awakens when spoken to. Pt disoriented times 3 after awakened. Pt on 2  L NC at this time. Pt has no s/sx of distress noted at this time. Pt encouraged to call for assistance if needed call light in reach, door open will monitor.

## 2019-05-17 NOTE — PROGRESS NOTES
Patient had a good night rest. Decreased coughing. No distress at this time. Bed is low, locked and call light within reach.

## 2019-05-18 NOTE — HOSPICE
Met with daughter in room to discuss hospice services, philosophy, levels of care, management of symptoms and medications, and coverage of DME and care. Daughter voiced understanding however would like to continue treatment at this time. Also she expressed fa lack of caregivers for the home setting and does not feel that she could take on her mother's care alone since she has her own health issues to deal with such at 02 at night with CPAP. Will follow up Monday with daughter for any further hospice needs. She is going to be talking with family over the weekend to see if they can come up with a plan for either home health, home hospice or transferring to a nursing facility. Thank you for this referral. 
Cesario Mondragon, RN, BSN Community Nurse Liaison [facility] [phone]

## 2019-05-18 NOTE — PROGRESS NOTES
Patient in bed at this time. Respirations even and unlabored. Patient has altered mental status but is patient baseline. Eyes open to voice. No distress or discomfort noted. No current complaints. Door remains open when unattended. Will continue to monitor.

## 2019-05-18 NOTE — PROGRESS NOTES
END OF SHIFT NOTE: 
 
Intake/Output 05/17 1901 - 05/18 0700 In: 1200 [I.V.:1200] Out: -   
Voiding: YES Catheter: NO 
Drain:   
 
 
 
 
Stool:  0 occurrences. Stool Assessment Stool Color: Brown (05/16/19 1000) Stool Appearance: Soft (05/16/19 1000) Stool Amount: Small (05/16/19 1000) Stool Source/Status: Incontinence; Rectum (05/16/19 3066) Emesis:  0 occurrences. VITAL SIGNS Patient Vitals for the past 12 hrs: 
 Temp Pulse Resp BP SpO2  
05/18/19 0309 97.5 °F (36.4 °C) 71 18 104/64 93 % 05/18/19 0130     95 % 05/17/19 2327 97.9 °F (36.6 °C) 71 18 103/52 92 % 05/17/19 2147    97/58   
05/17/19 2043     96 % 05/17/19 1932 97.7 °F (36.5 °C) 69 18 91/53 97 % Pain Assessment Pain 1 Pain Scale 1: Numeric (0 - 10) (05/17/19 1932) Pain Intensity 1: 0 (05/17/19 1932) Patient Stated Pain Goal: 0 (05/16/19 1405) Ambulating Yes with assistance Shift report to be given to oncoming nurse at the bedside.  
 
Bruno Mcfarland RN

## 2019-05-18 NOTE — PROGRESS NOTES
Patient in bed at this time. Disoriented x 4. Family at bedside. Respirations even and unlabored. No distress or discomfort noted. Call light within reach. Door remains open when unattended.

## 2019-05-18 NOTE — PROGRESS NOTES
Hospitalist Progress Note 2019 Admit Date: 2019  5:20 PM  
NAME: Roberto Bridges :  1928 MRN:  [id number] Attending: Ervin Spann MD 
PCP:  Juan Selby MD 
 
SUBJECTIVE:  
Patient is a 81yo F with hx HFrEF 20%, AICD, mild dementia who presents with 4d worsening cough and shortness of breath, sent to ER from PCP office. Interval History (): patient examined at bedside. No documented acute overnight events. Daughter at bedside. Mental status is improved today. Family talked with Hospice and are currently declining servicing as they wish to see if patient \"turns the corner first.\" Patient will answer a few questions appropriately but mumbles/moans intermittently incoherently. Review of Systems unable to be obtained due to clinical condition. PHYSICAL EXAM  
 
Visit Vitals /65 Pulse 66 Temp 98.3 °F (36.8 °C) Resp 18 Ht 5' (1.524 m) Wt 48.7 kg (107 lb 4.8 oz) SpO2 95% Breastfeeding? No  
BMI 20.96 kg/m² Temp (24hrs), Av.9 °F (36.6 °C), Min:97.5 °F (36.4 °C), Max:98.4 °F (36.9 °C) Oxygen Therapy O2 Sat (%): 95 % (19 1427) Pulse via Oximetry: 72 beats per minute (19 0130) O2 Device: Nasal cannula (19 1427) O2 Flow Rate (L/min): 3 l/min (19 1427) O2 Temperature: 35.6 °F (2 °C) (19) FIO2 (%): 28 % (05/15/19 194) Intake/Output Summary (Last 24 hours) at 2019 1530 Last data filed at 2019 7848 Gross per 24 hour Intake 1200 ml Output 666 ml Net 534 ml General: Elderly, awake but not alert or oriented x3 Lungs:  CTA Bilaterally, productive cough Heart:  Regular rate and rhythm,  No murmur, rub, or gallop Abdomen: Soft, Non distended, Non tender, Positive bowel sounds Extremities: Trace pitting edema in ankles Neurologic:  No focal deficits, not alert or oriented XR CHEST SNGL V Final Result XR CHEST PA LAT Final Result Impression: Right lung airspace opacities would raise suspicion for pneumonia in  
the proper clinical setting. ASSESSMENT Active Hospital Problems Diagnosis Date Noted  Pneumonia 05/13/2019  Mild dementia 12/03/2014  Chronic systolic heart failure ([facility] [address]  Hyperlipidemia  S/P implantation of automatic cardioverter/defibrillator (AICD) 04/29/2012 Plan: # Acute metabolic encephalopathy with adult failure to thrive 
- some interval improvement today compared to yesterday  
- very high risk of aspiration - speech therapy consult, change to honey-thickened diet - patient not advancing towards goals with PT currently - palliative/Hospice consults for goals of care discussion, will revisit based on clinical improvement 
- scheduled Seroquel 25 mg po qHS 
- avoid sedative/hypnotics, anticholinergics/antihistamines 
- avoid restraints if possible # Acute hypoxic respiratory failure with productive cough, likely CAP vs acute decompensated systolic heart failure 
- repeat TTE showing systolic dysfunction 
- repeat CXR showing worsening volume overload, likely related to heart failure 
- restart Lasix at 20 mg IV 
- discontinued azithro/Rocephin 
- switch to Zosyn to cover aspiration pneumonia (day 2) - strict I's/O's - DuoNebs scheduled # History of Afib 
- continue with amiodarone # History of hypothyroidism 
- continue with levothyroxine 
  
F/E/N: no fluids, replete electrolytes as needed, cardiac diet Ppx: heparin SQ for VTE Code Status: DNR Disposition: pending workup as above. Daughter states that she is aware of patient's poor prognosis and wants to see if Luciano Barrier turns the corner\" clinically first.  All questions answered. Signed By: Harsh Phillips DO May 18, 2019

## 2019-05-19 NOTE — PROGRESS NOTES
Patient in bed at this time. Disoriented x 4. Respirations even and unlabored. Currently resting quietly. No distress or discomfort noted. Call light within reach. Door remains open when patient unattended. Will continue to monitor.

## 2019-05-19 NOTE — PROGRESS NOTES
Patient in bed at this time. Resting in bed with eyes closed. Family at bedside. Respirations even and unlabored. No distress. No discomfort noted. Call light within reach.

## 2019-05-19 NOTE — PROGRESS NOTES
Patient is resting in bed with family at bedside. Respirations are even and unlabored. O2 at 2lpm NC. Patient is confusion. Bed is low, locked and call light within reach.

## 2019-05-19 NOTE — PROGRESS NOTES
Spoke with Dr. Rod Zuñiga regarding order for urinalysis and culture and patient's incontinence. Telephone order received to straight cath patient one time.

## 2019-05-20 NOTE — PROGRESS NOTES
Pt. In bed resting. Drowsy and confused. Breathing unlabored. Breath sounds coarse and diminished. Left wrist swollen, with swelling in right hand and fingers. Family at bedside.

## 2019-05-20 NOTE — PROGRESS NOTES
Patient is resting in bed with family at bedside. Respirations are even and unlabored. O2 at 2lpm NC. No distress at this time. Safety measure in place.

## 2019-05-20 NOTE — PROGRESS NOTES
Hospitalist Progress Note 2019 Admit Date: 2019  5:20 PM  
NAME: Taz Fuentes :  1928 MRN:  [id number] Attending: Cady Dietrich MD 
PCP:  Michel Holliday MD 
 
SUBJECTIVE:  
Patient is a 79yo F with hx HFrEF 20%, AICD, mild dementia who presents with 4d worsening cough and shortness of breath, sent to ER from PCP office. Interval History (209): patient examined at bedside. No documented acute overnight events. Daughter at bedside. Patient continues to be intermittently somnolent, evaluated by speech therapy today and no difference compared to last week. Daughter does not want to pursue Hospice at this time. Review of Systems unable to be obtained due to clinical condition. PHYSICAL EXAM  
 
Visit Vitals BP 90/50 Pulse 69 Temp 98.2 °F (36.8 °C) Resp 19 Ht 5' (1.524 m) Wt 48.6 kg (107 lb 2.3 oz) SpO2 97% Breastfeeding? No  
BMI 20.93 kg/m² Temp (24hrs), Av.5 °F (36.9 °C), Min:98.2 °F (36.8 °C), Max:99.1 °F (37.3 °C) Oxygen Therapy O2 Sat (%): 97 % (19 1520) Pulse via Oximetry: 70 beats per minute (19 1503) O2 Device: Nasal cannula (19 1503) O2 Flow Rate (L/min): 2 l/min (19 1503) O2 Temperature: 35.6 °F (2 °C) (19 2043) FIO2 (%): 32 % (19 0246) Intake/Output Summary (Last 24 hours) at 2019 1705 Last data filed at 2019 1254 Gross per 24 hour Intake 2471 ml Output 500 ml Net 1971 ml General: Elderly, somnolent but not alert or oriented x3 Lungs:  CTA Bilaterally, productive cough Heart:  Regular rate and rhythm,  No murmur, rub, or gallop Abdomen: Soft, Non distended, Non tender, Positive bowel sounds Extremities: No edema appreciated Neurologic:  No focal deficits, not alert or oriented XR CHEST SNGL V Final Result XR CHEST PA LAT Final Result Impression: Right lung airspace opacities would raise suspicion for pneumonia in  
the proper clinical setting. XR WRIST LT AP/LAT/OBL MIN 3V    (Results Pending) ASSESSMENT Active Hospital Problems Diagnosis Date Noted  Pneumonia 05/13/2019  Mild dementia 12/03/2014  Chronic systolic heart failure ([address]  Hyperlipidemia  S/P implantation of automatic cardioverter/defibrillator (AICD) 04/29/2012 Plan: # Acute metabolic encephalopathy with adult failure to thrive 
- continue with honey-thickened diet, very high risk of aspiration - patient did participate with PT today 
- palliative/Hospice consulted, family declining services at this time 
- scheduled Seroquel 25 mg po qHS 
- avoid sedative/hypnotics, anticholinergics/antihistamines 
- avoid restraints if possible # Acute hypoxic respiratory failure with productive cough, likely CAP vs acute decompensated systolic heart failure 
- repeat TTE showing systolic dysfunction 
- repeat CXR showing worsening volume overload, likely related to heart failure - switch to Lasix 20 mg qdaily, has had low BPs with higher, intravenous doses of diuretics  
- discontinued azithro/Rocephin 
- switch to Zosyn to cover aspiration pneumonia (day 4) - strict I's/O's - DuoNebs scheduled # History of Afib 
- continue with amiodarone # History of hypothyroidism 
- continue with levothyroxine 
  
F/E/N: no fluids, replete electrolytes as needed, cardiac diet Ppx: heparin SQ for VTE Code Status: DNR Disposition: pending workup as above. Family declining Hospice at this time. SNF vs NH placement at discharge. All questions answered. Signed By: Corby Hahn DO May 20, 2019

## 2019-05-20 NOTE — PROGRESS NOTES
Problem: Dysphagia (Adult) Goal: *Acute Goals and Plan of Care (Insert Text) Description LTG: Patient will tolerate least restrictive diet without overt signs or symptoms of airway compromise. STG: Patient will tolerate puree diet and thin liquids by tsp only without overt signs or symptoms of airway compromise. Goal updated 5/20/19. STG: Patient will participate in modified barium swallow study as clinically indicated. Outcome: Progressing Towards Goal 
SPEECH LANGUAGE PATHOLOGY: BEDSIDE SWALLOW NOTE: Daily Note 1 NAME/AGE/GENDER: Cristel Stiles is a 80 y.o. female DATE: 5/20/2019 PRIMARY DIAGNOSIS: Pneumonia [J18.9] ICD-10: Treatment Diagnosis: R13.12 Oropharyngeal Dysphagia. INTERDISCIPLINARY COLLABORATION: Registered Nurse and Physician PRECAUTIONS/ALLERGIES: Celebrex [celecoxib] and Other medication ASSESSMENT:  
Patient seen for ongoing diet tolerance this PM. Patient's daughter and son in law at bedside. Patient currently on puree diet and honey thick liquids via spoon. Nurse assistant reports patient with significant coughing with breakfast meal this am. Patient demonstrated overt s/sx airway compromise with thin liquids via spoon on 1 out of 4 trials and honey thick liquids via spoon on 1 out of 5 trials with weak cough post swallow with both honey thick and thin liquids. Patient with single swallow per bolus with minimal laryngeal elevation noted upon palpation. Patient unable to follow directions to take trials from cup. Straw not trialed  in ordered to limit rate and size of bolus presentation with liquids. No overt s/sx airway compromise with puree consistency across 6 trials and single swallow per bolus. No oral residue noted post swallow. Consulted Dr. Cindy Teresa regarding patient exhibiting overt s/sx airway compromise with thin and honey thick liquids and patient's risk for aspiration.  Dr. Cindy Teresa reports family does not want feeding tube and want comfort measure for patient. Due to patient demonstrating overt s/sx airway compromise with thin and thickened liquids recommend patient receive thin liquids at this time due to aspiration of thin liquids being less likely to cause pneumonia versus aspiration of thickened liquids. Recommend puree consistency diet and thin liquids via spoon. Medication crushed in puree. Will continue to follow for diet tolerance as indicated. ?????? ? ? This section established at most recent assessment?????????? 
PROBLEM LIST (Impairments causing functional limitations): 1. Oropharyngeal dysphagia REHABILITATION POTENTIAL FOR STATED GOALS: Fair PLAN OF CARE:  
Patient will benefit from skilled intervention to address the following impairments. RECOMMENDATIONS AND PLANNED INTERVENTIONS (Benefits and precautions of therapy have been discussed with the patient.): 
· PO:  Pureed · Liquids:  regular thin via spoon MEDICATIONS: 
· Crushed in puree ASPIRATION PRECAUTIONS: 
· Slow rate of intake · Small bites/sips · Upright at 90 degrees during meal 
COMPENSATORY STRATEGIES/MODIFICATIONS INCLUDING: 
· Spoon OTHER RECOMMENDATIONS (including follow up treatment recommendations): · Family training/education · Patient education RECOMMENDED DIET MODIFICATIONS DISCUSSED WITH: 
· Hospitalist 
· Nursing · Family · Patient FREQUENCY/DURATION: Continue to follow patient 3 times a week for duration of hospital stay to address above goals. RECOMMENDED REHABILITATION/EQUIPMENT: (at time of discharge pending progress): Due to the probability of continued deficits (see above) this patient will likely need continued skilled speech therapy after discharge. SUBJECTIVE:  
Patient alert. Difficulty following directions throughout session. Daughter at bedside History of Present Injury/Illness: Ms. Alessandra Oconnor  has a past medical history of Anemia of other chronic disease (4/26/2014), Anxiety, Arrhythmia, CAD (coronary artery disease) (06Q7), Chronic systolic heart failure ([facility] [address] Coagulation disorder ([facility] [address] Congestive heart failure ([facility] [address] (08/2007), Contusion of chest wall (4/29/2012), Falls (4/23/2014), Generalized OA (6/5/2015), Heart failure ([facility] [address] Hip pain (4/26/2014), History of skin cancer (1992), [place] (hard of hearing), Hygroma (4/23/2014), Hyperglycemia (4/29/2012), Hyperlipidemia, Hypertension, Hypotension (4/23/2014), Mild dementia (12/3/2014), Nausea & vomiting, Neutrophilic leukocytosis (0/53/1458), Osteoarthritis, Osteoporosis (12/3/2014), Postmenopausal bone loss, S/P cardiac pacemaker procedure (1/2011), S/P implantation of automatic cardioverter/defibrillator (AICD) (4/29/2012), S/P placement of cardiac pacemaker (4/29/2012), Shoulder fracture (1991), and Syncope (4/29/2012). .  She also  has a past surgical history that includes pr biopsy of skin lesion (1992); hx bladder suspension (1995?); hx juan and bso (1984); hx tonsillectomy (as a child); hx heart catheterization (2007); hx shoulder arthroscopy (1991); hx pacemaker (1/13/2011); and hx ptca (08/2007). Present Symptoms:   
Pain Scale 1: Numeric (0 - 10) Pain Intensity 1: 0 Pain Location 1: Arm Current Medications: No current facility-administered medications on file prior to encounter. Current Outpatient Medications on File Prior to Encounter Medication Sig Dispense Refill  atorvastatin (LIPITOR) 10 mg tablet As directed 90 Tab 3  carvedilol (COREG) 3.125 mg tablet As directed 180 Tab 3  
 levothyroxine (SYNTHROID) 50 mcg tablet Take 1 Tab by mouth Daily (before breakfast). 90 Tab 3  
 amiodarone (PACERONE) 100 mg tablet Take 1 Tab by mouth daily. 30 Tab 0  
 potassium chloride (K-DUR, KLOR-CON) 20 mEq tablet Take 1 Tab by mouth daily. 90 Tab 3  
 calcium-vitamin D (OYSTER SHELL) 500 mg(1,250mg) -200 unit per tablet Take 1 Tab by mouth three (3) times daily (with meals).  90 Tab 0  
  acetaminophen (TYLENOL) 500 mg tablet Take 500 mg by mouth every six (6) hours as needed for Pain.  nitroglycerin (NITROSTAT) 0.4 mg SL tablet 1 Tab by SubLINGual route every five (5) minutes as needed. (Patient taking differently: 1 Tab by SubLINGual route every five (5) minutes as needed for Chest Pain. PRN Chest pain) 25 Tab 3  
 docusate sodium (COLACE) 100 mg capsule Take 1 Cap by mouth two (2) times a day. Indications: prevent constipation 60 Cap 11  
 albuterol (PROVENTIL HFA, VENTOLIN HFA, PROAIR HFA) 90 mcg/actuation inhaler Take 1 Puff by inhalation every four (4) hours as needed for Wheezing. 1 Inhaler 1 Current Dietary Status:   
 Regular/thin 
Social History/Home Situation:   
Home Environment: Private residence # Steps to Enter: 0 Wheelchair Ramp: Yes One/Two Story Residence: One story Living Alone: No 
Support Systems: Child(ian), Family member(s) Patient Expects to be Discharged to[de-identified] Rehabilitation facility Current DME Used/Available at Home: Walker, rolling, Shower chair, Grab bars, Raised toilet seat, Commode, bedside, Wheelchair Tub or Shower Type: Shower OBJECTIVE:  
Respiratory Status:  Nasal cannula  2 l/min Oral Motor Structure/Speech:  Oral-Motor Structure/Motor Speech Labial: Decreased rate, Decreased seal 
Lingual: Decreased rate, Decreased strength, Incoordinated Cognitive and Communication Status: 
Neurologic State: Alert;Confused Orientation Level: Disoriented X4 Cognition: Decreased command following Perception: Appears intact Perseveration: No perseveration noted Safety/Judgement: Not assessed BEDSIDE SWALLOW EVALUATION Oral Assessment: P.O. Trials: 
Patient Position: Upright in bed The patient was given tsp-small bite amounts of the following:  
Consistency Presented: Puree; Thin liquid;Honey thick liquid How Presented: SLP-fed/presented;Spoon ORAL PHASE: 
Bolus Acceptance: Impaired Bolus Formation/Control: Impaired Propulsion: Delayed (# of seconds) Type of Impairment: Delayed Oral Residue: None PHARYNGEAL PHASE: 
  
Laryngeal Elevation: Weak Aspiration Signs/Symptoms: Weak cough Vocal Quality: Low volume Pharyngeal Phase Characteristics: Poor endurance;Easily fatigued ; Audible swallow OTHER OBSERVATIONS: 
Rate/bite size: Impaired Endurance:  Impaired Comments: Tool Used: Dysphagia Outcome and Severity Scale (TEZ) Score Comments Normal Diet  ? 7 With no strategies or extra time needed Functional Swallow  ? 6 May have mild oral or pharyngeal delay Mild Dysphagia ? 5 Which may require one diet consistency restricted (those who demonstrate penetration which is entirely cleared on MBS would be included) Mild-Moderate Dysphagia  ? 4 With 1-2 diet consistencies restricted Moderate Dysphagia  ? 3 With 2 or more diet consistencies restricted Moderately Severe Dysphagia  ? 2 With partial PO strategies (trials with ST only) Severe Dysphagia  ? 1 With inability to tolerate any PO safely Score:  Initial: 3 Most Recent: 3 (Date: 5/20/19) Interpretation of Tool: The Dysphagia Outcome and Severity Scale (TEZ) is a simple, easy-to-use, 7-point scale developed to systematically rate the functional severity of dysphagia based on objective assessment and make recommendations for diet level, independence level, and type of nutrition. [place] / [place] / Product Type: Medicare /  
 
TREATMENT:  
 (In addition to Assessment/Re-Assessment sessions the following treatments were rendered) Dysphagia Activities: Activities/Procedures listed utilized to improve progress in swallow safety. Required moderate cueing to improve swallow safety, work toward diet advancement and decrease aspiration risk.  
MODALITIES:  
  
  
  
  
  
  
  
  
  
  
    
  
  
  
  
  
  
  
  
   
 
ORAL MOTOR  EXERCISES: 
  
  
  
  
  
  
  
  
  
  
  
  
  
 LARYNGEAL / PHARYNGEAL EXERCISES: 
  
  
  
  
  
  
  
  
  
  
  
  
  
  
  
  
  
  
  
  
  
    
  
  
  
  
  
  
  
  
  
  
  
  
  
  
  
  
  
  
  
   
 
__________________________________________________________________________________________________ Safety: After treatment position/precautions: 
· RN notified · Family at bedside · Upright in Bed Treatment Assessment: . Progression/Medical Necessity:  
· Patient is expected to demonstrate progress in swallow strength, swallow timeliness, swallow function, diet tolerance and swallow safety ·  to improve swallow safety, work toward diet advancement and decrease aspiration risk · . Compliance with Program/Exercises: Will assess as treatment progresses Reason for Continuation of Services/Other Comments: 
· Patient continues to require skilled intervention due to dysphagia · . Recommendations/Intent for next treatment session: \"Treatment next visit will focus on Diet tolerance\". Total Treatment Duration: 
Time In: 5610 Time Out: 1216 Xavi CABRERA, CCC-SLP

## 2019-05-20 NOTE — PROGRESS NOTES
Problem: Mobility Impaired (Adult and Pediatric) Goal: *Acute Goals and Plan of Care (Insert Text) Description STG: 
(1.)Ms. Ramiro Delatorre will move from supine to sit and sit to supine  with CONTACT GUARD ASSIST within 3 treatment day(s). (2.)Ms. Ramiro Delatorre will transfer from bed to chair and chair to bed with MINIMAL ASSIST using the least restrictive device within 3 treatment day(s). (3.)Ms. Ramiro Delatorre will ambulate with MINIMAL ASSIST for 50 feet with the least restrictive device within 3 treatment day(s). LTG: 
(1.)Ms. Ramiro Delatorre will move from supine to sit and sit to supine  in bed with SUPERVISION within 7 treatment day(s). (2.)Ms. Ramiro Delatorre will transfer from bed to chair and chair to bed with CONTACT GUARD ASSIST using the least restrictive device within 7 treatment day(s). (3.)Ms. Ramiro Delatorre will ambulate with CONTACT GUARD ASSIST for 150 feet with the least restrictive device within 7 treatment day(s). ________________________________________________________________________________________________ Outcome: Progressing Towards Goal 
  
PHYSICAL THERAPY: Daily Note and AM 5/20/2019 INPATIENT: PT Visit Days : 4 [place] / Product Type: Medicare /   
  
NAME/AGE/GENDER: Celso Patel is a 80 y.o. female PRIMARY DIAGNOSIS: Pneumonia [J18.9] Pneumonia Pneumonia ICD-10: Treatment Diagnosis:  
 · Generalized Muscle Weakness (M62.81) · Other lack of cordination (R27.8) · Other abnormalities of gait and mobility (R26.89) · Repeated Falls (R29.6) · History of falling (Z91.81) Precaution/Allergies: 
Celebrex [celecoxib] and Other medication ASSESSMENT:  
Ms. Ramiro Delatorre is extremely hard of hearing and is not wearing her hearing aids this afternoon. Pt lives with her daughter in a single level home with ramp entry. Pt ambulates with a walker and is independent with ADLs at baseline. Pt reports 4 recent falls. 5/20/19: Pt presents supine in bed with daughter and son in law in room. She sat up to the EOB with moderate assist x2 and extra difficulty due to very sore L hand (swollen wrist?)  She sat EOB with assist at times with a R and posterior lean. She was able to follow commands fairly well for below seated exercises. She was able to stand 3 times with HHA with Moderate assist x2 but was unable to take any steps. Some progress made and should be able to participate at rehab. This section established at most recent assessment PROBLEM LIST (Impairments causing functional limitations): 1. Decreased Strength 2. Decreased Transfer Abilities 3. Decreased Ambulation Ability/Technique 4. Decreased Balance INTERVENTIONS PLANNED: (Benefits and precautions of physical therapy have been discussed with the patient.) 1. Balance Exercise 2. Bed Mobility 3. Gait Training 4. Home Exercise Program (HEP) 5. Neuromuscular Re-education/Strengthening 6. Therapeutic Activites 7. Therapeutic Exercise/Strengthening 8. Transfer Training TREATMENT PLAN: Frequency/Duration: 3 times a week for duration of hospital stay Rehabilitation Potential For Stated Goals: Fair REHAB RECOMMENDATIONS (at time of discharge pending progress):   
Placement: It is my opinion, based on this patient's performance to date, that Ms. Carey Harmon may benefit from intensive therapy at a [address] [place] [address] after discharge due to the functional deficits listed above that are likely to improve with skilled rehabilitation and concerns that he/she may be unsafe to be unsupervised at home due to decreased safety and independence with functional mobility . Equipment:  
? None at this time HISTORY:  
History of Present Injury/Illness (Reason for Referral): 
See H&P below. Patient is a 79yo F with hx HFrEF 20%, arythmia with AICD in place, mild dementia who presents with 4d worsening cough and shortness of breath. Seen in PCP office today, sent to ER for further eval. 
Wet cough, rare productive sputum. No fevers at home. No cp. Coughing spells with dyspnea but no sob at rest.  No leg edema, no orthopnea. Chronic debility and dementia, family desires to be able to bring her back home after discharge if possible. CXR in ER with evidence of RUL pneumonia. Mild leukocytosis. Initially significantly hypotensive and fluid bolus started, but pressure recovered significantly. Past Medical History/Comorbidities:  
Ms. Carlos Myrick  has a past medical history of Anemia of other chronic disease (4/26/2014), Anxiety, Arrhythmia, CAD (coronary artery disease) (95Z4), Chronic systolic heart failure ([facility] Coagulation disorder ([facility] Congestive heart failure ([facility] (08/2007), Contusion of chest wall (4/29/2012), Falls (4/23/2014), Generalized OA (6/5/2015), Heart failure ([facility] Hip pain (4/26/2014), History of skin cancer (1992), [place] (hard of hearing), Hygroma (4/23/2014), Hyperglycemia (4/29/2012), Hyperlipidemia, Hypertension, Hypotension (4/23/2014), Mild dementia (12/3/2014), Nausea & vomiting, Neutrophilic leukocytosis (1/63/1216), Osteoarthritis, Osteoporosis (12/3/2014), Postmenopausal bone loss, S/P cardiac pacemaker procedure (1/2011), S/P implantation of automatic cardioverter/defibrillator (AICD) (4/29/2012), S/P placement of cardiac pacemaker (4/29/2012), Shoulder fracture (1991), and Syncope (4/29/2012). Ms. Carlos Myrick  has a past surgical history that includes pr biopsy of skin lesion (1992); hx bladder suspension (1995?); hx juan and bso (1984); hx tonsillectomy (as a child); hx heart catheterization (2007); hx shoulder arthroscopy (1991); hx pacemaker (1/13/2011); and hx ptca (08/2007). Social History/Living Environment:  
Home Environment: Private residence # Steps to Enter: 0 Wheelchair Ramp: Yes One/Two Story Residence: One story Living Alone: No 
Support Systems: Child(ian), Family member(s) Patient Expects to be Discharged to[de-identified] Rehabilitation facility Current DME Used/Available at Home: Walker, rolling, Shower chair, Grab bars, Raised toilet seat, Commode, bedside, Wheelchair Tub or Shower Type: Shower Prior Level of Function/Work/Activity: 
Ambulates with RW and independent with ADLs at baseline, 4 recent falls Number of Personal Factors/Comorbidities that affect the Plan of Care: 1-2: MODERATE COMPLEXITY EXAMINATION:  
Most Recent Physical Functioning:  
Gross Assessment: 
  
         
  
Posture: 
  
Balance: 
Sitting - Static: Fair (occasional) Sitting - Dynamic: Fair (occasional) Bed Mobility: 
Supine to Sit: Moderate assistance;Assist x2 Sit to Supine: Maximum assistance;Assist x2 Wheelchair Mobility: 
  
Transfers: 
Sit to Stand: Moderate assistance;Assist x2(3x) Gait: 
  
Distance (ft): (unable) Body Structures Involved: 1. Eyes and Ears 2. Joints 3. Muscles Body Functions Affected: 1. Respiratory 2. Neuromusculoskeletal 
3. Movement Related Activities and Participation Affected: 1. Mobility 2. Domestic Life 3. Community, Social and [place] Number of elements that affect the Plan of Care: 3: MODERATE COMPLEXITY CLINICAL PRESENTATION:  
Presentation: Evolving clinical presentation with changing clinical characteristics: MODERATE COMPLEXITY CLINICAL DECISION MAKING:  
[employer] AM-PAC 6 Clicks Basic Mobility Inpatient Short Form How much difficulty does the patient currently have. .. Unable A Lot A Little None 1. Turning over in bed (including adjusting bedclothes, sheets and blankets)? ? 1   ? 2   ? 3   ? 4  
2. Sitting down on and standing up from a chair with arms ( e.g., wheelchair, bedside commode, etc.)   ? 1   ? 2   ? 3   ? 4  
3. Moving from lying on back to sitting on the side of the bed?   ? 1   ? 2   ? 3   ? 4 How much help from another person does the patient currently need. .. Total A Lot A Little None 4. Moving to and from a bed to a chair (including a wheelchair)? ? 1   ? 2   ? 3   ? 4  
5. Need to walk in hospital room? ? 1   ? 2   ? 3   ? 4  
6. Climbing 3-5 steps with a railing? ? 1   ? 2   ? 3   ? 4  
© 2007, Trustees of [address] [place] [address] [address] [address], under license to [employer]. All rights reserved Score:  Initial: 16 Most Recent: X (Date: -- ) Interpretation of Tool:  Represents activities that are increasingly more difficult (i.e. Bed mobility, Transfers, Gait). Medical Necessity:    
· Patient is expected to demonstrate progress in strength, balance, coordination and functional technique ·  to increase independence with bed mobility, transfers, ambulation, balance, and overall functional mobility. · . Reason for Services/Other Comments: 
· Patient continues to demonstrate capacity to improve independence with overall functional mobility, balance, gait, transfers, and bed mobility which will increase independence, decrease amount of assistance required from caregiver and increase safety · . Use of outcome tool(s) and clinical judgement create a POC that gives a: Questionable prediction of patient's progress: MODERATE COMPLEXITY  
  
 
 
 
TREATMENT:  
(In addition to Assessment/Re-Assessment sessions the following treatments were rendered) Pre-treatment Symptoms/Complaints:  None Pain: Initial:  
Pain Intensity 1: 4 Pain Location 1: Arm Pain Orientation 1: Left  Post Session:  0/10 Therapeutic Activity: (    15 minutes): Therapeutic activities including bed mobility, trunk control, static and dynamic sitting balance training, and sit to stand 3 times with attempt at taking steps to improve mobility, strength, balance and coordination. Required moderate assist x2 to promote static and dynamic balance in standing. Therapeutic Exercise: (15 Minutes ):  Exercises per grid below to improve mobility, strength, balance and coordination.   Required minimal assistance and  visual and verbal cues to promote proper body mechanics. Progressed resistance, range, repetitions and complexity of movement as indicated. Date: 
5/17/19 Date: 
5/20/19 Date: Activity/Exercise Parameters Parameters Parameters LAQ 15 x B 20x B Marches 15 x B 20x B Heel slides 15 x B Ankle pumps 20 x B 10x B Hip abduction 15 x B 10x B AAB Braces/Orthotics/Lines/Etc:  
· Nasal cannula Treatment/Session Assessment:   
· Response to Treatment:  See above. · Interdisciplinary Collaboration:  
o Physical Therapy Assistant 
o Registered Nurse · After treatment position/precautions:  
o Supine in bed 
o Bed/Chair-wheels locked 
o Bed in low position 
o Call light within reach 
o Family at bedside · Compliance with Program/Exercises: Compliant most of the time · Recommendations/Intent for next treatment session: \"Next visit will focus on advancements to more challenging activities and reduction in assistance provided\". Total Treatment Duration: PT Patient Time In/Time Out Time In: 1246 Time Out: 1055 Elvira Nones, PTA SPT

## 2019-05-20 NOTE — PROGRESS NOTES
Pt. In bed resting with family at bedside. Pt is drowsy and confused. Talking incoherently. Breathing is even and unlabored. Breath sounds diminished and coarse. Left wrist swelling with blanchable skin. Right hand swelling in fingers and hand. Call light within reach. Bed low and locked.

## 2019-05-20 NOTE — HOSPICE
Spoke with daughter Fredy Brunner and she feels at this time she wants to pursue life extending treatments and therapy for her mom. Offered to follow up with her on Thursday but she declined. She will call us if the patient declines or they need further hospice information. [employer] Sn[employer] you for this referral. 
Gino Mcmanus, RN, BSN Community Nurse Liaison [facility] [phone]

## 2019-05-20 NOTE — PROGRESS NOTES
Problem: Self Care Deficits Care Plan (Adult) Goal: *Acute Goals and Plan of Care (Insert Text) Description 1. Pt will toilet with min assist  
2. Pt will complete functional mobility for ADLs with min assist 
3. Pt will complete upper body bathing and dressing with min assist using AE as needed 4. Pt will complete grooming and hygiene with set up 5. Pt will complete HEP with min cues to promote increased BUE strength and functional use for ADLs 6. Pt will tolerate 23 minutes functional activity with min or fewer rest breaks to promote increased endurance for ADLs 7. Pt will complete bed mobility with CGA in prep for ADLs Timeframe: 7 days Outcome: Progressing Towards Goal 
  
OCCUPATIONAL THERAPY: Daily Note 5/20/2019 INPATIENT: OT Visit Days: 2 [place] / Product Type: Medicare /  
  
NAME/AGE/GENDER: Tariq Irwin is a 80 y.o. female PRIMARY DIAGNOSIS:  Pneumonia [J18.9] Pneumonia Pneumonia ICD-10: Treatment Diagnosis:  
 · Generalized Muscle Weakness (M62.81) Precautions/Allergies: 
  falls Celebrex [celecoxib] and Other medication ASSESSMENT:  
Ms. Teodoro Sanon was admitted with cough, SOB, and weakness d/t pna. Per daughter and son in law at bedside, pt lives with them and requires assistance for bathing, dressing, and toileting d/t dementia, and CGA for mobility using RW for fall prevention. Family reports 4 falls within the last 6 months. This session, pt presented reclined in bed, very drowsy but opened her eyes and engaged w/ therapist in response to verbal stimulation. Pt A&O to person only, increased confusion and decreased overall cogntion since initial evaluation and followed ~25% basic commands with mod-max cues. Severe general weakness. Pt required max- total assistance to doff glasses and wash face and comb hair, currently requires total assistance for feeding.  Pt grimaced and moaned in pain when therapist attempted to move her LUE to incorporate into ADLs, significant redness and swelling noted at radial styloid process. No progress towards goals, continue POC. This section established at most recent assessment PROBLEM LIST (Impairments causing functional limitations): 1. Decreased Strength 2. Decreased ADL/Functional Activities 3. Decreased Transfer Abilities 4. Decreased Balance 5. Decreased Activity Tolerance 6. Increased Fatigue 7. Increased Shortness of Breath 8. Decreased Cognition INTERVENTIONS PLANNED: (Benefits and precautions of occupational therapy have been discussed with the patient.) 1. Activities of daily living training 2. Adaptive equipment training 3. Balance training 4. Therapeutic activity 5. Therapeutic exercise TREATMENT PLAN: Frequency/Duration: Follow patient 3 times/ week to address above goals. Rehabilitation Potential For Stated Goals: Good REHAB RECOMMENDATIONS (at time of discharge pending progress):   
Placement: It is my opinion, based on this patient's performance to date, that Ms. Lima Curran may benefit from intensive therapy at a [address] [place] [address] after discharge due to the functional deficits listed above that are likely to improve with skilled rehabilitation and concerns that he/she may be unsafe to be unsupervised at home due to inability to complete ADLs or mobility for ADLs . Equipment:  
? None at this time OCCUPATIONAL PROFILE AND HISTORY:  
History of Present Injury/Illness (Reason for Referral): 
See H&P Past Medical History/Comorbidities:  
Ms. Lima Curran  has a past medical history of Anemia of other chronic disease (4/26/2014), Anxiety, Arrhythmia, CAD (coronary artery disease) (94X4), Chronic systolic heart failure ([address] Coagulation disorder ([address] Congestive heart failure ([address] (08/2007), Contusion of chest wall (4/29/2012), Falls (4/23/2014), Generalized OA (6/5/2015), Heart failure [facility]), Hip pain (4/26/2014), History of skin cancer (1992), [place] (hard of hearing), Hygroma (4/23/2014), Hyperglycemia (4/29/2012), Hyperlipidemia, Hypertension, Hypotension (4/23/2014), Mild dementia (12/3/2014), Nausea & vomiting, Neutrophilic leukocytosis (8/01/7864), Osteoarthritis, Osteoporosis (12/3/2014), Postmenopausal bone loss, S/P cardiac pacemaker procedure (1/2011), S/P implantation of automatic cardioverter/defibrillator (AICD) (4/29/2012), S/P placement of cardiac pacemaker (4/29/2012), Shoulder fracture (1991), and Syncope (4/29/2012). Ms. Dina Mitchell  has a past surgical history that includes pr biopsy of skin lesion (1992); hx bladder suspension (1995?); hx juan and bso (1984); hx tonsillectomy (as a child); hx heart catheterization (2007); hx shoulder arthroscopy (1991); hx pacemaker (1/13/2011); and hx ptca (08/2007). Social History/Living Environment:  
Home Environment: Private residence # Steps to Enter: 0 Wheelchair Ramp: Yes One/Two Story Residence: One story Living Alone: No 
Support Systems: Child(ian), Family member(s) Patient Expects to be Discharged to[de-identified] Rehabilitation facility Current DME Used/Available at Home: Walker, rolling, Shower chair, Grab bars, Raised toilet seat, Commode, bedside, Wheelchair Tub or Shower Type: Shower Prior Level of Function/Work/Activity: 
Lives w/ family, assisted w/ ADLs d/t dementia, RW for mobility w/ assistance for transfers Number of Personal Factors/Comorbidities that affect the Plan of Care: Expanded review of therapy/medical records (1-2):  MODERATE COMPLEXITY ASSESSMENT OF OCCUPATIONAL PERFORMANCE[de-identified]  
Activities of Daily Living:  
Basic ADLs (From Assessment) Complex ADLs (From Assessment) Feeding: Minimum assistance Oral Facial Hygiene/Grooming: Minimum assistance, Moderate assistance Bathing: Maximum assistance Upper Body Dressing: Maximum assistance Lower Body Dressing: Total assistance Toileting: Total assistance Instrumental ADL Meal Preparation: Total assistance Homemaking: Total assistance Medication Management: Total assistance Financial Management: Total assistance Grooming/Bathing/Dressing Activities of Daily Living Grooming Grooming Assistance: Total assistance(dependent) Cognitive Retraining Safety/Judgement: Not assessed Feeding Feeding Assistance: Maximum assistance; Total assistance (dependent) Bed/Mat Mobility Supine to Sit: Moderate assistance;Assist x2 Sit to Supine: Maximum assistance;Assist x2 Sit to Stand: Moderate assistance;Assist x2(3x) Most Recent Physical Functioning:  
Gross Assessment: 
AROM: Generally decreased, functional 
Strength: Generally decreased, functional 
Coordination: Generally decreased, functional 
         
  
Posture: 
  
Balance: 
Sitting - Static: Fair (occasional) Sitting - Dynamic: Fair (occasional) Bed Mobility: 
Supine to Sit: Moderate assistance;Assist x2 Sit to Supine: Maximum assistance;Assist x2 Wheelchair Mobility: 
  
Transfers: 
Sit to Stand: Moderate assistance;Assist x2(3x) Patient Vitals for the past 6 hrs: 
 BP SpO2 O2 Flow Rate (L/min) Pulse 05/20/19 1118 98/49 98 %  67  
05/20/19 1503  93 % 2 l/min  Mental Status Neurologic State: Drowsy, Confused Orientation Level: Oriented to person, Disoriented to time, Disoriented to situation, Disoriented to place Cognition: Decreased attention/concentration, Decreased command following Perception: Appears intact Perseveration: No perseveration noted Safety/Judgement: Not assessed Physical Skills Involved: 
1. Balance 2. Strength 3. Activity Tolerance Cognitive Skills Affected (resulting in the inability to perform in a timely and safe manner): 1. Executive Function 2. Short Term Recall 3. Long Term Memory 4. Sustained Attention 5. Divided Attention Psychosocial Skills Affected: 1. Habits/Routines Number of elements that affect the Plan of Care: 3-5:  MODERATE COMPLEXITY CLINICAL DECISION MAKIN[address] [place] [id number] AM-PAC 6 Clicks Daily Activity Inpatient Short Form How much help from another person does the patient currently need. .. Total A Lot A Little None 1. Putting on and taking off regular lower body clothing? ? 1   ? 2   ? 3   ? 4  
2. Bathing (including washing, rinsing, drying)? ? 1   ? 2   ? 3   ? 4  
3. Toileting, which includes using toilet, bedpan or urinal?   ? 1   ? 2   ? 3   ? 4  
4. Putting on and taking off regular upper body clothing? ? 1   ? 2   ? 3   ? 4  
5. Taking care of personal grooming such as brushing teeth? ? 1   ? 2   ? 3   ? 4  
6. Eating meals? ? 1   ? 2   ? 3   ? 4  
© , Trustees of [address] [place] [address], under license to [employer]. All rights reserved Score:  Initial: 14 Most Recent: X (Date: -- ) Interpretation of Tool:  Represents activities that are increasingly more difficult (i.e. Bed mobility, Transfers, Gait). Medical Necessity:    
· Patient is expected to demonstrate progress in strength, balance and functional technique ·  to increase independence with ADLs · . Reason for Services/Other Comments: 
· Patient continues to require present interventions due to patient's inability to complete ADLs · . Use of outcome tool(s) and clinical judgement create a POC that gives a: MODERATE COMPLEXITY  
 
 
 
TREATMENT:  
(In addition to Assessment/Re-Assessment sessions the following treatments were rendered) Pre-treatment Symptoms/Complaints:   
Pain: Initial:  
Pain Intensity 1: 4 Pain Location 1: Wrist 
Pain Orientation 1: Left Pain Intervention(s) 1: Rest  Post Session:  0 Self Care: (11 min): Procedure(s) (per grid) utilized to improve and/or restore self-care/home management as related to grooming and self feeding.  Required moderate visual, verbal, manual and tactile cueing to facilitate activities of daily living skills and compensatory activities. Braces/Orthotics/Lines/Etc:  
· O2 Device: Nasal cannula Treatment/Session Assessment:   
· Response to Treatment:  fatigue · Interdisciplinary Collaboration:  
o Occupational Therapist 
o Registered Nurse · After treatment position/precautions:  
o Supine in bed 
o Bed/Chair-wheels locked 
o Bed in low position 
o Call light within reach 
o RN notified 
o Family at bedside · Compliance with Program/Exercises: Compliant most of the time. · Recommendations/Intent for next treatment session: \"Next visit will focus on advancements to more challenging activities and reduction in assistance provided\". Total Treatment Duration: OT Patient Time In/Time Out Time In: 1310 Time Out: 1321 Avni Crook OT

## 2019-05-20 NOTE — PROGRESS NOTES
Pt. In bed resting with family at bedside. Left wrist swollen and tender to touch. Breathing unlabored, breath sounds coarse and diminished. Call light within reach, bed low and locked, and safety measures in place.

## 2019-05-21 PROBLEM — G93.41 ACUTE METABOLIC ENCEPHALOPATHY: Status: ACTIVE | Noted: 2019-01-01

## 2019-05-21 NOTE — PROGRESS NOTES
Problem: Dysphagia (Adult) Goal: *Acute Goals and Plan of Care (Insert Text) Description LTG: Patient will tolerate least restrictive diet without overt signs or symptoms of airway compromise. Goal discontinued5/21/19 STG: Patient will tolerate puree diet and thin liquids by tsp only without overt signs or symptoms of airway compromise. Goal updated 5/20/19. Goal discontinued 5/21/19 STG: Patient will participate in modified barium swallow study as clinically indicated. Goal discontinued 5/21/19 Outcome: Progressing Towards Goal 
SPEECH LANGUAGE PATHOLOGY: BEDSIDE SWALLOW NOTE: Daily Note 2 and Discharge NAME/AGE/GENDER: Galo Maya is a 80 y.o. female DATE: 5/21/2019 PRIMARY DIAGNOSIS: Pneumonia [J18.9] ICD-10: Treatment Diagnosis: R13.12 Oropharyngeal Dysphagia. INTERDISCIPLINARY COLLABORATION: Registered Nurse and Physician PRECAUTIONS/ALLERGIES: Celebrex [celecoxib] and Other medication ASSESSMENT:  
Patient seen for ongoing assessment of diet tolerance this AM. Daughter and other visitors at bedside. Assessed patient's airway protection with thin liquid (water) via spoon with patient demonstrating weak cough x1 across 5 trials. Laryngeal elevation present upon palpation; however, appears reduced. No overt s/sx airway compromise with puree across 4 trials. Mildly delayed oral prep time; however, functional. Educated patient's daughter on patient demonstrating overt s/sx of airway compromise with both thin and thickened liquids with thin liquids being less likely to cause pneumonia if aspirated. Also educated patient's daughter on patient being at high risk for pneumonia due to suspected aspiration of liquids. Patient's daughter expressed being aware of aspiration risk and risk for recurring pneumonia. She continues to express that they do not wish for patient to have feeding tube and would like her to eat and drink for pleasure.  Educated patient's daughter on need for patient to receive  oral care 2-3 times a day to reduce bacteria in oral cavity. Continue  puree consistency diet and thin liquids via spoon for pleasure. Medication crushed in puree. No further dysphagia treatment indicated at this time. ?????? ? ? This section established at most recent assessment?????????? 
PROBLEM LIST (Impairments causing functional limitations): 1. Oropharyngeal dysphagia REHABILITATION POTENTIAL FOR STATED GOALS: Fair PLAN OF CARE:  
Patient will benefit from skilled intervention to address the following impairments. RECOMMENDATIONS AND PLANNED INTERVENTIONS (Benefits and precautions of therapy have been discussed with the patient.): 
· PO:  Pureed · Liquids:  regular thin via spoon MEDICATIONS: 
· Crushed in puree ASPIRATION PRECAUTIONS: 
· Slow rate of intake · Small bites/sips · Upright at 90 degrees during meal 
COMPENSATORY STRATEGIES/MODIFICATIONS INCLUDING: 
· Spoon OTHER RECOMMENDATIONS (including follow up treatment recommendations): · Family training/education · Patient education RECOMMENDED DIET MODIFICATIONS DISCUSSED WITH: 
· Hospitalist 
· Nursing · Family · Patient FREQUENCY/DURATION: Continue to follow patient 3 times a week for duration of hospital stay to address above goals. RECOMMENDED REHABILITATION/EQUIPMENT: (at time of discharge pending progress): Due to the probability of continued deficits (see above) this patient will likely need continued skilled speech therapy after discharge. SUBJECTIVE:  
Patient alert, but drowsy. Minimal verbalizations this date.   
 
History of Present Injury/Illness: Ms. Santosh Phipps  has a past medical history of Anemia of other chronic disease (4/26/2014), Anxiety, Arrhythmia, CAD (coronary artery disease) (72F4), Chronic systolic heart failure ([facility] [address] Coagulation disorder ([facility] [address] Congestive heart failure ([facility] [address] (08/2007), Contusion of chest wall (4/29/2012), Falls (4/23/2014), Generalized OA (6/5/2015), Heart failure ([facility] [address] Hip pain (4/26/2014), History of skin cancer (1992), [place] (hard of hearing), Hygroma (4/23/2014), Hyperglycemia (4/29/2012), Hyperlipidemia, Hypertension, Hypotension (4/23/2014), Mild dementia (12/3/2014), Nausea & vomiting, Neutrophilic leukocytosis (7/96/7552), Osteoarthritis, Osteoporosis (12/3/2014), Postmenopausal bone loss, S/P cardiac pacemaker procedure (1/2011), S/P implantation of automatic cardioverter/defibrillator (AICD) (4/29/2012), S/P placement of cardiac pacemaker (4/29/2012), Shoulder fracture (1991), and Syncope (4/29/2012). .  She also  has a past surgical history that includes pr biopsy of skin lesion (1992); hx bladder suspension (1995?); hx juan and bso (1984); hx tonsillectomy (as a child); hx heart catheterization (2007); hx shoulder arthroscopy (1991); hx pacemaker (1/13/2011); and hx ptca (08/2007). Present Symptoms:   
Pain Scale 1: FLACC Pain Intensity 1: 0 Current Medications: No current facility-administered medications on file prior to encounter. Current Outpatient Medications on File Prior to Encounter Medication Sig Dispense Refill  atorvastatin (LIPITOR) 10 mg tablet As directed 90 Tab 3  carvedilol (COREG) 3.125 mg tablet As directed 180 Tab 3  
 levothyroxine (SYNTHROID) 50 mcg tablet Take 1 Tab by mouth Daily (before breakfast). 90 Tab 3  
 amiodarone (PACERONE) 100 mg tablet Take 1 Tab by mouth daily. 30 Tab 0  
 potassium chloride (K-DUR, KLOR-CON) 20 mEq tablet Take 1 Tab by mouth daily. 90 Tab 3  
 calcium-vitamin D (OYSTER SHELL) 500 mg(1,250mg) -200 unit per tablet Take 1 Tab by mouth three (3) times daily (with meals). 90 Tab 0  
 acetaminophen (TYLENOL) 500 mg tablet Take 500 mg by mouth every six (6) hours as needed for Pain.  nitroglycerin (NITROSTAT) 0.4 mg SL tablet 1 Tab by SubLINGual route every five (5) minutes as needed.  (Patient taking differently: 1 Tab by SubLINGual route every five (5) minutes as needed for Chest Pain. PRN Chest pain) 25 Tab 3  
 docusate sodium (COLACE) 100 mg capsule Take 1 Cap by mouth two (2) times a day. Indications: prevent constipation 60 Cap 11  
 albuterol (PROVENTIL HFA, VENTOLIN HFA, PROAIR HFA) 90 mcg/actuation inhaler Take 1 Puff by inhalation every four (4) hours as needed for Wheezing. 1 Inhaler 1 Current Dietary Status: Puree/ thin liquids via teaspoon Social History/Home Situation:   
Home Environment: Private residence # Steps to Enter: 0 Wheelchair Ramp: Yes One/Two Story Residence: One story Living Alone: No 
Support Systems: Child(ian), Family member(s) Patient Expects to be Discharged to[de-identified] Rehabilitation facility Current DME Used/Available at Home: Walker, rolling, Shower chair, Grab bars, Raised toilet seat, Commode, bedside, Wheelchair Tub or Shower Type: Shower OBJECTIVE:  
Respiratory Status:  Nasal cannula  3 l/min(WEANED TO 2L) Oral Motor Structure/Speech:  Oral-Motor Structure/Motor Speech Labial: Decreased rate, Decreased seal 
Lingual: Decreased rate, Decreased strength, Incoordinated Cognitive and Communication Status: 
Neurologic State: Alert;Drowsy Orientation Level: Disoriented X4 Cognition: Decreased command following Perception: Appears intact Perseveration: No perseveration noted Safety/Judgement: Not assessed BEDSIDE SWALLOW EVALUATION Oral Assessment: P.O. Trials: 
Patient Position: Upright in bed The patient was given tsp-small bite amounts of the following:  
Consistency Presented: Puree; Thin liquid; Ice chips How Presented: SLP-fed/presented ORAL PHASE: 
Bolus Acceptance: Impaired Bolus Formation/Control: Impaired Propulsion: Delayed (# of seconds) Type of Impairment: Delayed Oral Residue: None PHARYNGEAL PHASE: 
Initiation of Swallow: (suspect delayed swallow) Laryngeal Elevation: (present; however, appears minimal upon palpation) Aspiration Signs/Symptoms: Weak cough Vocal Quality: Low volume Pharyngeal Phase Characteristics: Poor endurance;Easily fatigued OTHER OBSERVATIONS: 
Rate/bite size: Impaired Endurance:  Impaired Comments: Tool Used: Dysphagia Outcome and Severity Scale (TEZ) Score Comments Normal Diet  ? 7 With no strategies or extra time needed Functional Swallow  ? 6 May have mild oral or pharyngeal delay Mild Dysphagia ? 5 Which may require one diet consistency restricted (those who demonstrate penetration which is entirely cleared on MBS would be included) Mild-Moderate Dysphagia  ? 4 With 1-2 diet consistencies restricted Moderate Dysphagia  ? 3 With 2 or more diet consistencies restricted Moderately Severe Dysphagia  ? 2 With partial PO strategies (trials with ST only) Severe Dysphagia  ? 1 With inability to tolerate any PO safely Score:  Initial: 3 Most Recent: 3 (Date: 5/21/19) Interpretation of Tool: The Dysphagia Outcome and Severity Scale (TEZ) is a simple, easy-to-use, 7-point scale developed to systematically rate the functional severity of dysphagia based on objective assessment and make recommendations for diet level, independence level, and type of nutrition. [place] / Plan[place] / Product Type: Medicare /  
 
TREATMENT:  
 (In addition to Assessment/Re-Assessment sessions the following treatments were rendered) Dysphagia Activities: Activities/Procedures listed utilized to improve progress in swallow safety. Required moderate cueing to improve swallow safety, work toward diet advancement and decrease aspiration risk.  
MODALITIES:  
  
  
  
  
  
  
  
  
  
  
    
  
  
  
  
  
  
  
  
   
 
ORAL MOTOR  EXERCISES: 
  
  
  
  
  
  
  
  
  
  
  
  
  
  
  
  
  
  
  
  
  
  
  
  
  
  
    
  
  
  
  
  
  
  
  
  
  
  
  
  
  
  
  
  
  
  
  
  
  
  
  
  
   
 
 LARYNGEAL / PHARYNGEAL EXERCISES: 
  
  
  
  
  
  
  
  
  
  
  
  
  
  
  
  
  
  
  
  
  
    
  
  
  
  
  
  
  
  
  
  
  
  
  
  
  
  
  
  
  
   
 
__________________________________________________________________________________________________ Safety: After treatment position/precautions: 
· RN notified · Family at bedside · Upright in Bed Treatment Assessment: . Progression/Medical Necessity:  
Patient unable to participate in ther-ex to improve swallow function due to impaired cognitive status. Patient is currently consuming a puree diet with thin liquids via spoon. Patient exhibits overt s/sx airway compromise with thin and thickened liquids. Recommend thin liquids due to aspiration of thin liquids being less likely to cause pneumonia than thickened liquids. Family aware of aspiration risk and per MD do not want to pursue feeding tube for patient. Compliance with Program/Exercises: No further dysphagia treatment clinically indicated. Reason for Continuation of Services/Other Comments: · No further skilled speech therapy clinically indicated for dysphagia Recommendations/Intent for next treatment session: Discharge patient this date. Total Treatment Duration: 
Time In: 2371 Time Out: 1130 Tong CABRERA, CCC-SLP

## 2019-05-21 NOTE — PROGRESS NOTES
Pt. In bed resting with family visitors. Drowsy and confused. Speaking incoherently when spoken to. Breathing even and unlabored. Breath sounds coarse and diminished. Safety measures in place.

## 2019-05-21 NOTE — PROGRESS NOTES
BSR received patient is sleeping in bed with family present there are no signs of distress  Will continue to monitor

## 2019-05-21 NOTE — PROGRESS NOTES
Pt is sleeping raises eyebrows or moans to verbal stimuli. Unable to take PO medications. O2 via nasal cannula 2L. Family staying at bedside.

## 2019-05-21 NOTE — PROGRESS NOTES
Pt awake in bed. Pt appears stressed and worried, pt is mumbling. Respirations at 32 a minute. Dr Jackson Rubio aware and morphine 1mg to be administered to help with respirations. Family at bedside.

## 2019-05-21 NOTE — PROGRESS NOTES
Pt. Resting in bed. Confused and drowsy, In supine position. Breathing even and unlabored. Breath sounds coarse. Unproductive cough. Nasal cannula at 3L O2. Right wrist swelling. Bed low and locked and safety measures in place.

## 2019-05-21 NOTE — PROGRESS NOTES
Dr Bonds notified patient with labored respirations  Talked with daughter Florencio Garcia and notified her that her mother was breathing harder Shruti request that her mother be given medicine to be sure she is comfortable

## 2019-05-21 NOTE — PROGRESS NOTES
Patient has slept throughout the night with no signs of distress  Respirations are even unlabored on 3L NC will give BSR to oncoming RN

## 2019-05-21 NOTE — PROGRESS NOTES
Hospitalist Progress Note 2019 Admit Date: 2019  5:20 PM  
NAME: Jodeane Kussmaul :  1928 MRN:  [id number] Attending: Bryan Treadwell MD 
PCP:  Bessie Watts MD 
 
SUBJECTIVE:  
Patient is a 79yo F with hx HFrEF 20%, AICD, mild dementia who presents with 4d worsening cough and shortness of breath, sent to ER from PCP office. Interval History ()): patient examined at bedside. No documented acute overnight events. Daughter at bedside. Continues to be somnolent, did stand yesterday with PT but still at very high risk for continued decompensation given high risk for aspiration. Review of Systems unable to be obtained due to clinical condition. PHYSICAL EXAM  
 
Visit Vitals BP (!) 92/37 Pulse (!) 56 Temp 97.8 °F (36.6 °C) Resp 20 Ht 5' (1.524 m) Wt 48.8 kg (107 lb 9.4 oz) SpO2 95% Breastfeeding? No  
BMI 21.01 kg/m² Temp (24hrs), Av.1 °F (36.7 °C), Min:97.6 °F (36.4 °C), Max:98.9 °F (37.2 °C) Oxygen Therapy O2 Sat (%): 95 % (19 1544) Pulse via Oximetry: 60 beats per minute (19 1338) O2 Device: Nasal cannula (19 1438) O2 Flow Rate (L/min): 2 l/min (19 1438) O2 Temperature: 35.6 °F (2 °C) (19 2043) FIO2 (%): 32 % (19 0246) Intake/Output Summary (Last 24 hours) at 2019 1627 Last data filed at 2019 1325 Gross per 24 hour Intake 1227 ml Output  Net 1227 ml General: Elderly, somnolent and not alert or oriented x3 Lungs:  CTA Bilaterally, productive cough Heart:  Regular rate and rhythm,  No murmur, rub, or gallop Abdomen: Soft, Non distended, Non tender, Positive bowel sounds Extremities: No edema appreciated Neurologic:  No focal deficits, not alert or oriented XR WRIST LT AP/LAT/OBL MIN 3V Final Result IMPRESSION: 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  
scaphoid articulation. XR CHEST SNGL V Final Result XR CHEST PA LAT Final Result Impression: Right lung airspace opacities would raise suspicion for pneumonia in  
the proper clinical setting. ASSESSMENT Active Hospital Problems Diagnosis Date Noted  Acute metabolic encephalopathy 16/64/9280  Pneumonia 05/13/2019  Mild dementia 12/03/2014  Chronic systolic heart failure ([facility] [address]  Hyperlipidemia  S/P implantation of automatic cardioverter/defibrillator (AICD) 04/29/2012 Plan: # Acute metabolic encephalopathy with adult failure to thrive 
- continue with thin liquid diet for pleasure, very high risk of aspiration - patient did participate with PT today 
- palliative/Hospice consulted, family requesting to pursue service at this time 
- scheduled Seroquel 25 mg po qHS 
- avoid sedative/hypnotics, anticholinergics/antihistamines 
- avoid restraints if possible # Acute hypoxic respiratory failure with productive cough, likely CAP vs acute decompensated systolic heart failure 
- repeat TTE showing systolic dysfunction - switch to Lasix 20 mg qdaily, has had low BPs with higher, intravenous doses of diuretics  
- discontinued azithro/Rocephin 
- will discontinue Zosyn as no current signs of sepsis 
- strict I's/O's - DuoNebs scheduled # Left wrist swelling 
- likely from chronic changes and from swelling from IV line 
- x-ray of left wrist negative for acute fracture # History of Afib 
- continue with amiodarone # History of hypothyroidism 
- continue with levothyroxine 
  
F/E/N: no fluids, replete electrolytes as needed, cardiac diet Ppx: heparin SQ for VTE Code Status: DNR Disposition: pending workup as above. Family discussing initiating Hospice services, daughter is considering [employer]. All questions answered. Signed By: Annie Keane DO May 21, 2019

## 2019-05-21 NOTE — PROGRESS NOTES
ALFONZO called Celestino with [facility] to inform her that patient's daughter is now interested in hospice. ALFONZO will set up a meeting tomorrow with the family and hospice.

## 2019-05-21 NOTE — PROGRESS NOTES
Patient is now awake  Respirations labored and 30/min  Patient mumbling  Appears stressed  Bilateral crackles  Call placed to Dr Cindy Teresa

## 2019-05-22 PROBLEM — J44.9 COPD (CHRONIC OBSTRUCTIVE PULMONARY DISEASE) (HCC): Status: ACTIVE | Noted: 2017-03-21

## 2019-05-22 PROBLEM — S72.001A FRACTURE OF RIGHT HIP (HCC): Status: ACTIVE | Noted: 2019-01-01

## 2019-05-22 PROBLEM — F41.9 ANXIETY: Status: ACTIVE | Noted: 2018-01-01

## 2019-05-22 PROBLEM — D62 ACUTE BLOOD LOSS ANEMIA: Status: ACTIVE | Noted: 2019-01-01

## 2019-05-22 PROBLEM — I73.00 RAYNAUD'S DISEASE: Status: ACTIVE | Noted: 2019-01-01

## 2019-05-22 PROBLEM — N39.0 E. COLI UTI: Status: ACTIVE | Noted: 2017-02-23

## 2019-05-22 PROBLEM — B96.20 E. COLI UTI: Status: ACTIVE | Noted: 2017-02-23

## 2019-05-22 NOTE — PROGRESS NOTES
Pt resting in bed. Respirations even and unlabored. Family at bedside, bed low and locked, call light within reach.

## 2019-05-22 NOTE — PROGRESS NOTES
Hospitalist Progress Note 2019 Admit Date: 2019  5:20 PM  
NAME: Steph Qiu :  1928 MRN:  [id number] Attending: Ava Yu MD 
PCP:  Venecia Nance MD 
 
SUBJECTIVE:  
Patient is a 79yo F with hx HFrEF 20%, AICD, mild dementia who presents with 4d worsening cough and shortness of breath, sent to ER from PCP office. Interval History (): patient examined at bedside. No documented acute overnight events. Family at bedside. Meeting with Hospice later today. Continues to be somnolent with episodes of tachypnea and shortness of breath, asking for \"ice cream\" this morning. Not alert or oriented. Review of Systems unable to be obtained due to clinical condition. PHYSICAL EXAM  
 
Visit Vitals BP 93/45 Pulse 75 Temp 98.4 °F (36.9 °C) Resp 21 Ht 5' (1.524 m) Wt 48.8 kg (107 lb 9.4 oz) SpO2 93% Breastfeeding? No  
BMI 21.01 kg/m² Temp (24hrs), Av.1 °F (36.7 °C), Min:97.4 °F (36.3 °C), Max:98.8 °F (37.1 °C) Oxygen Therapy O2 Sat (%): 93 % (19 1200) Pulse via Oximetry: 61 beats per minute (19 0750) O2 Device: Nasal cannula (19 0750) O2 Flow Rate (L/min): 3 l/min(weaned to 2) (19 0750) O2 Temperature: 35.6 °F (2 °C) (19 2043) FIO2 (%): 32 % (19 0246) Intake/Output Summary (Last 24 hours) at 2019 1317 Last data filed at 2019 9352 Gross per 24 hour Intake 420 ml Output  Net 420 ml General: Elderly, somnolent and not alert or oriented x3 Lungs:  CTA Bilaterally, productive cough Heart:  Regular rate and rhythm,  No murmur, rub, or gallop Abdomen: Soft, Non distended, Non tender, Positive bowel sounds Extremities: No edema appreciated Neurologic:  No focal deficits, not alert or oriented XR WRIST LT AP/LAT/OBL MIN 3V Final Result IMPRESSION: 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  
scaphoid articulation. XR CHEST SNGL V Final Result XR CHEST PA LAT Final Result Impression: Right lung airspace opacities would raise suspicion for pneumonia in  
the proper clinical setting. ASSESSMENT Active Hospital Problems Diagnosis Date Noted  Acute metabolic encephalopathy 25/54/1255  Pneumonia 05/13/2019  Mild dementia 12/03/2014  Chronic systolic heart failure ([facility] [address]  Hyperlipidemia  S/P implantation of automatic cardioverter/defibrillator (AICD) 04/29/2012 Plan: # Acute metabolic encephalopathy with adult failure to thrive 
- continue with thin liquid diet for pleasure, very high risk of aspiration 
- family pursuing Hospice with meeting set up for this afternoon, daughter interested in [facility] 
- discontinue all scheduled BP checks, blood draws, vital checks, and scheduled medications 
- start comfort measures 
- morphine 1 mg q15 minutes as needed for pain/shortness of breath - Ativan 1 mg q4h prn anxiety/agitation 
- avoid sedative/hypnotics, anticholinergics/antihistamines 
- avoid restraints if possible # Acute hypoxic respiratory failure with productive cough, likely CAP vs acute decompensated systolic heart failure 
- repeat TTE showing systolic dysfunction 
- discontinued Lasix as patient with persistent SBPs in the 90s, pursuing comfort measures as above 
- discontinued all antibiotics # Left wrist swelling 
- likely from chronic changes and from swelling from IV line 
- x-ray of left wrist negative for acute fracture Code Status: DNR Disposition: Family opting for Home Hospice. Comfort measures as above. All questions answered. Signed By: Harsh Phillips DO May 22, 2019

## 2019-05-22 NOTE — HOSPICE
Case discussed with Dr. Mary Carrillo and patient approved for [facility] level of care at [facility] when a bed is available. Discussed with CM and she will speak with Dr. Zachery Dancer regarding turning off patient's AICD prior to discharge. Thank you for this referral. Will keep CM aware of bed availability. Ben Blackwell, RN, BSN Community Nurse Liaison [facility] [phone]

## 2019-05-22 NOTE — PROGRESS NOTES
Spoke with Dr Hunter Stokes about family requesting more morphine and pt current BP of 88/42. MD ordered to decrease dose to 0.5mg of morphine every 30 minutes unless systolic drops below 80.

## 2019-05-22 NOTE — PROGRESS NOTES
Pt. In bed sleeping with family visitors at bedside. Breathing even and unlabored. Breath sounds coarse with crackles. Comfort measures. NC O2 at 2L. Safety measures in place.

## 2019-05-22 NOTE — HOSPICE
Met with patient's daughter, Tanesha Cornell, at the patient's bedside. Informed Shruti that Dr. Natali Capellan [facility]) Medical Director determined that her mother meets General In Patient (GIP) criteria for care at the [facility]). All hospice consents were signed. Informed Shruti, the bedside RN and the CM that at present there are no beds available at the [facility]. The Charge Nurse at the [facility] will call the patient's bedside nurse or CM when a bed is available. A copy of the DNR was given to the bedside nurse so she could have the patient's attending physician sign it to be used when the patient is transported to the [facility]. Thank you for this referral, 
 
Misael Lima RN, BSN Community Nurse Liaison [facility] [phone]

## 2019-05-22 NOTE — PROGRESS NOTES
PT Note: Attempted PT treatment this AM. Family considering hospice and requested to hold therapy until decision is made. Will check back as time/schedule allows. Thanks, 
[facility] SPT

## 2019-05-22 NOTE — PROGRESS NOTES
Pt. Resting in bed with family visitors at bedside. Supine. Breathing even and unlabored. Breath sounds coarse, crackles, and diminished. Mumbling incoherently. Alert and confused. Disoriented x4. Rook boots bilaterally. Bed low and locked with safety measures in place.

## 2019-05-22 NOTE — PROGRESS NOTES
Spoke with Dr. Anthony Pearce about patient condition deteriorating and family wanting to speak with Dr. Anthony Pearce about making pt. comfort care. Dr. Anthony Pearce said she will try to make it up here when she is able to.

## 2019-05-22 NOTE — PROGRESS NOTES
OT Note: 
Per chart review pt's family is considering hospice care. Will hold treatment until decision is made. Jocelyn Fountain

## 2019-05-23 PROBLEM — J69.0 ASPIRATION PNEUMONIA OF LOWER LOBE (HCC): Status: ACTIVE | Noted: 2019-01-01

## 2019-05-23 PROBLEM — I50.20 SYSTOLIC CHF (HCC): Status: ACTIVE | Noted: 2019-01-01

## 2019-05-23 NOTE — PROGRESS NOTES
Respiratory Care Services Policy Number: [id number] Title: Patient Care Assessment Program 
 
Effective Date: 01/1999 Revised Date: 05/2014 Reviewed Date: 06/2013/ 03/2015, 03/2016, 06/2017 Overview In an effort to improve quality and reduce costs of respiratory care at [facility], the Respiratory Department has developed a number of Patient Care Protocols. These protocols have been developed according to Cindi 3 and are utilized for those patients who are ordered respiratory therapy using therapeutic indications and standardized approaches for accomplishing objectives. Patient Care Protocols are intended to improve care by: ? Defining the indications and standards of care agreed upon by the Pulmonary Medicine and [address] [place] [address] of [facility]. ? Training respiratory care practitioners to apply those criteria to individual patients and modify therapy as indicated by the protocols. ? Documenting the indication and care plan as part of the initial ordering process. ? Tapering or discontinuing treatments once the indication for therapy changes. The Patient Care Protocols shall be universally applied throughout the hospital as determined by  
the Pulmonary Medicine and [address] [place] [address]. Rationale for Patient Care Assessment Protocols: 
? Continuous Quality Improvement ? Cost containment ? Standardization of care 
? Enhanced continuity of care ? Utilization review ? Timely intervention The following patient care assessment protocols have been developed: ? Aerosolized Medication ? Bronchial Hygiene ? Oxygen Weaning Protocol ? Volume Expansion/Secretion Clearance Non-Vented ? Ventilator Weaning Protocol ? CVRU Fast Track Weaning Protocol ? Asthma Treatment Protocol ER ? Pediatric Asthma Treatment Protocol ER ? Alpha-1 Antitrypsin Deficiency Protocol ? Prone Positioning Protocol The Director of [address] [place] [address] oversees the Patient Care Assessment Program. The Clinical/ is responsible for protocol development and training. The Supervisor is responsible for implementation and  activities. Each patient with an order for respiratory treatments will receive an evaluation. All Registered Respiratory Care Practitioners (RCPs) will perform the evaluations. The same evaluation tool will be utilized for all initial and follow-up assessments. If the patient does not meet criteria for ordered therapy, the therapy will be discontinued. If the patient demonstrates an adverse response to initially ordered therapy, the therapy will be discontinued and the physician will be contacted. Specific physician's orders that deviate from protocols and are deemed \"inappropriate\" or \"unsafe\" will be addressed with ordering physician and/or medical director as required. Patients of [facility] and [facility] [address] will not be assessed for the first 24 hours as the Medical Director of [address] [place] [address] has requested that changes in therapy should not be made during that time frame. Respiratory Patient Care Assessment Protocols I.  Policy: In an effort to provide quality patient care and effective utilization of services, physicians who order respiratory therapy will have their patients treated via the protocols established (see attached). All Registered [address] (RCPs) will complete the initial assessment. This assessment will indicate patient needs, and the care plan developed for the patient and will performed within 24 hours of admission. Frequency of the therapy will be set according to the results of the respiratory therapy evaluation and frequency guidelines policy.  Reassessment will be continued done every 48 hours and more frequently as needed for the individual patient. II. Purpose: A. To provide a process that will allow for ongoing assessment and care plan modification for patients receiving respiratory services based on both objective and or subjective patient responses to interventions. This process of protocol utilization will assist in patient care progression while eliminating the need for the physician to continually update respiratory therapy orders. B. To assure continuity of respiratory care that meets [facility] Clinical Practice Guidelines. III. Initiation:  Implement Respiratory Care Protocols for patients who are ordered by physician  
       to receive respiratory therapy procedures or for ventilator management. IV. Protocol: A. Upon receiving an order for therapy the RCP will review the patients EMR (electronic medical record) for all pertinent information includin. Physicians order for therapy 2. Patient history and physical examination 3. Physician progress notes 4. Diagnostic. X-rays, PFTs, arterial blood gases etc. 
 
 
B. The RCP will perform a respiratory assessment in the following manner: 1. Perform hand hygiene per hospital policy utilizing Standard Precautions for all patients and following transmission-based isolation as indicated per policy. 2. Identify patient via ID bracelet verifying patient name and birth date. 3. General observations: color, pattern and effort of breathing, chest expansion, (symmetrical and bilateral), level of consciousness and the ability to ambulate. 4. The RCP will assess patients cough ability and determine if Nasotracheal suctioning is needed. If patient is unable to produce sputum, at that time, the RCP should question the patient with regard to their sputum: production, color consistency, frequency and amount.  
5. Auscultation: Using a stethoscope, the RCP will listen and note quality of breath sounds and presence or absence of adventitious breath sounds in all lung fields, both anteriorly and posteriorly. 6. Upon completing the EMR review and physical assessment, the RCP will document findings in the RT Assessment section of the EMR. The score level will be provided and will be used to determine the frequency of therapy. V. Indications: A. Indications for Bronchial Hygiene Protocol will include: 1. Potential for or presence of atelectasis. 2. Need for hydration and removal of retained secretions. 3. Need for improvement of cough effectiveness. 4. Presence of conditions associated with disorder of pulmonary clearance: 
a. Cystic fibrosis b. Bronchiectasis B. Indications for Aerosolized Medication(s) Protocol should include: 1. Treatment of bronchospasm/wheezing 2. Improvement of mucociliary clearance 3. Treatment of stridor 4. History of Asthma or COPD 
           C.  Indications for Oxygen Therapy Protocol should include: 1. Documented hypoxemia 2. Severe trauma 3. Acute myocardial infarction 4. Short-term therapy (e.g. post anesthesia recovery) VI. Maintenance: A. Timely patient assessment is an integral part of this protocol therefore the following will be applied: 1. All non- critical care patients will be evaluated upon receiving initial respiratory care orders within 24 hours and re-evaluated within 48 hours (or more as needed). 2. Orders requesting a Respiratory Consult will be responded to in the following manner: 
a. In patient emergency situations, the RCP assigned to the floor will respond immediately to the patient, provide an initial respiratory assessment, and contact the patients physician as necessary for appropriate orders. b. In non-emergent situations, the RCP assigned to the floor will respond to the patient within 90 minutes and provide an initial respiratory assessment and contact patients physician as necessary for appropriate orders. c. An RCP will provide a comprehensive assessment as soon as possible. 3. Upon completion of an evaluation, the RCP will complete documentation in the patients EMR in the RT Assessment section. 4. The RCP who completes the assessment will document orders for therapy in the orders section of the patients EMR selecting new order. Next, per protocol should be selected indicating it is a protocol order and sign orders should be selected to complete the process. 5. The Pharmacy and Therapeutics (P&T) Committee has mandated that the medication Xopenex may be changed to unit dose albuterol without an order, except for those patients receiving Xopenex due to cardiac arrhythmias. The dosage for these patients should be 0.63 mg. and may be changed from 1.25 mg. to 0.63 mg per P & T Committee by the RCP completing the assessment. 6. Patients who are not experiencing cardiac arrhythmias, and are ordered Xopenex and Atrovent may be changed to Duoneb. VII. Safety: A. The following safety issues shall be monitored: 1. The RCP will perform hand hygiene per hospital policy utilizing Standard Precautions for all patients and following transmission-based isolation as indicated per hospital policy. 2. The RCP must exercise professional judgment in classifying the patient for frequency of therapy. 3. Appropriate classification of the patient will require an evaluation utilizing the Therapy Assessment Protocol Guidelines. 4. The RCP will confer with the physician concerning the care of the patient at any time questions or problems arise. 5. If during therapy, the patient exhibits no improvement or deterioration in clinical status the RCP will notify the physician and the patients nurse. VIII. Interventions: A. The patients nurse is responsible concerning all items related to his/her care. Ongoing communication with nursing is essential to successful protocol management. B. The RCP recognizes the value of the team approach in meeting the patients needs. Nursing input regarding the patients pulmonary condition will be sought as needed. IX. Reportable conditions: The RCP will inform the physician if: 1. There are acute changes in patients respiratory status. 2. The therapist is unable to determine appropriate care plan upon assessment. 3. The patient fails to reach therapeutic objective. 4. A change or additional medication is needed. X.  Patient Education: A. Patient will receive instruction on the followin. The treatment modality, including objectives and proper technique of therapy 2. Respiratory medications B. Documentation shall occur in the patient education section of the patients EMR. XI. Documentation: Record all findings as described above in the patients EMR. Related Protocols: A. Aerosolized Medication Protocol B. Bronchial Hygiene C. Oxygen Protocol D. Volume Expansion/Secretion Clearance E. Ventilator Weaning Protocols References: 
[address] Standard L    Respiratory Care Department Policy, Procedure and Protocol Guideline Manual, , SUJATHA Squires. L  Therapist Driven Respiratory Care Protocols  A Practitioners Guide for Criteria-Based Respiratory Care by Fab Salas M.D., and SUJATHA Archibald RRT. L  The rationale for therapist-driven protocols: an update. Respiratory Care 1998; 56:066-359 N   [facility] Clinical Practice Guidelines. Respiratory Care Services Policy Number: -[id number] Title: Oxygen Protocol Effective Date: 1996 Revised Date: 2013, 2016 Reviewed Date: 2014, 2015, 2017 I. Policy: The Oxygen Protocol will be initiated for all patients upon written order from physician for administration of oxygen therapy or if patient is found to have an oxygen saturation of 88% or less. II. Purpose: To provide protocol driven respiratory therapy for the administration of oxygen at concentrations greater than that in ambient air with the intent of treating or preventing the symptoms and manifestations of hypoxia. III. Responsibility: Director Respiratory Care Services, all Respiratory Care Practitioners IV. Indications: A. Implement this protocol for all patients when physician orders oxygen to be administered or when patient is found to have an oxygen saturation of 88% or less. B. To assure routine monitoring of patient's oxygen saturation b.i.d. and to make appropriate adjustments in accordance with ordered oxygen saturation parameters. C. To assure continuity of respiratory care that meets [facility] Clinical Practice Guidelines. V. Assessment:  Assess the following parameters to determine the need to adjust oxygen: A. Measurement of patient's oxygen saturation via pulse oximetry. B. Observation of patient's color, respiratory effort, and responsiveness. C. Measurement of heart rate and respiratory rate. VI. Initiation:  Upon receipt of an order for oxygen, the RCP will: A. Verify order in the patient's EMR, which should include the desired oxygen saturation to be maintained. B. In the event that no saturation is specified, a saturation of 90% will be maintained for all patients with the exception of cardiac patients who will be maintained at 92%. C. The patient will be placed on oxygen with humidity as ordered by the physician to achieve the prescribed oxygen saturation. D. Patients, who are found to have a SaO2 of 88% or less, may be started on supplemental oxygen as described above. E. The patient will be informed of the \"no smoking policy\" and instructed in the proper use of oxygen therapy. F. Once desired oxygen saturation has been achieved, the RCP will document FIO2 and oxygen saturation in the respiratory section of the patients EMR. VII. Maintenance: A. 30-second oxygen saturation check will be taken to maintain the saturation ordered by the physician each day. B. Patients will be assessed each shift by pulse oximetry to determine if oxygen needs to be decreased, increased or discontinued. C. If changes in FIO2 are indicated, all changes will be documented in the respiratory section of the patients EMR. D. If no changes in FIO2 are required, the patient's oxygen flow rate and saturation will be recorded in the respiratory section of the patients EMR. E. Per [facility], patients who are receiving oxygen therapy but are not on oxygen at home, should be weaned off oxygen as soon as possible or when anticipated discharge becomes evident. Carole Creeks will be discontinued after oxygen saturation has been maintained for 24 hours on room air and documented in the patients EMR. G. Patients on the Inpatient Rehabilitation area on 9th floor will be exempt from having their oxygen discontinued per protocol. Oxygen may be weaned but will be changed to prn to meet the needs of the patient when exercising and participating in physical therapy. VIII. Safety: P will address the following safety issues: A. Identify patient using the two patient identifiers name and birth date via ID bracelet. B. Perform hand hygiene per hospital policy utilizing Standard Precautions for all patients and following transmission-based isolation as indicated per hospital policy. C. For cardiac patients, oxygen will be discontinued by order of physician. D. If a patients FIO2 requirements necessitate changing oxygen delivery devices to a high concentration of oxygen, the ordering physician will be notified. E. If a patient has a hemoglobin level <8 mg. RCP will consult physician before discontinuing oxygen. F. Patients who have an oxygen saturation >95% may be weaned by increments of 10%. G. Patients who have an oxygen saturation <95% may be weaned by increments of 5%. IX. Interventions: A. RCP will assess patient for signs of respiratory distress or suspicion of CO2 retention. B. An ABG may be obtained for patients exhibiting respiratory distress. C. An order should be entered into patients EMR for ABG under per protocol. X. Documentation A. Document assessment findings in the respiratory section of the patients EMR. B. Document changes in therapy per protocol in the respiratory orders section and in the care plan section of the patients EMR. C. Document patient education in the patient education section of the patients EMR. XI. Reportable Conditions:  Report to the physician immediately: A. Acute changes in patient's respiratory status. B. An oxygen saturation <85%. C. A change in oxygen delivery device to provide a high concentration of oxygen. XII. Patient Instructions: Review with Patient A. Purpose of oxygen therapy B. Proper technique for using oxygen C. No smoking policy Approval: Pulmonary Committee (1-25-96) Revision: Chest Committee (4-28-05)

## 2019-05-23 NOTE — PROGRESS NOTES
Visit Environment: LMSW went to Kaity's room. She is in her bed not responding to my voice at all. Her sister  Yvonne Tesfaye is sitting with her. LMSW sat with Yvonne Tesfaye and talked. Circumstances for Admission: Pt was admitted to the [facility] 5[facility]. She was transported to room 119 via ambulance. She has a hospice diagnosis of Acute on Chronic LV systolic dysfunction associated CHF. This is an order to admit, Raquel Huff to General inpatient hospice care at [address] [place] [address], For a first 80 day benefit interval.  She is a 80-year-old woman who has developed metabolic encephalopathy secondary to acute on chronic LV systolic dysfunction associated congestive heart failure(). Community acquired left lower lobe pneumonia is a related precipitating diagnosis. Microcytic anemia of chronic disease, hypoalbuminemia (2.4 g per DL), ischemic cardiomyopathy (With LVEF 20%), and long-standing vascular dementia with Behavioral disturbance requiring scheduled antipsychotic medication (Seroquel 25 mg daily at bedtime) are other diagnoses contributing to the severity of this patient's metabolic encephalopathy. 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 The patient has effectively ceased to eat, drink or take oral medication. Despite appropriate intervention for congestive heart failure, hypotension and community-acquired pneumonia in hospital.  On day 3 in hospital.  The patient's daughter, Fatou Aguilera, has elected on her behalf to stop life extending interventions and to confined further care to comfort measures only. Under these conditions patient's life expectancy is less than 1 week.   She will continue to require parenteral opioid and psychotropic medication for comfort, during that interval    Family/Social History: Pt is a 80year old  woman. She has 1 daughter [de-identified]. Pt has been  for 30 years. She worked when she was younger in the [employer] She has a very large family of siblings. She was one of 10 eight are still living. Saloni Martines states she does not think that her daughter Kaushal Lopez,  will be here to visit much. She eluded to some hard feelings about Shruti not sitting here at pt's bedside. SW educated the sister on different ways people cope and that not all people can be here and sit connelly. Spritiual Assessment: When she attended [employer] it was at [facility]: Pt lived in her home her daughter and her NERY care for her 24-7. They all live in the pt's home. Patient's Emotional and Cognitive Status: Pt is unresponsive at this time. Primary Caregiver / People Involved: [id number] (Dtr and NERY), Jf Mann is her grandson. Pt has 7 living siblings: Jayshree Caro and Jazzmine Garrido. All of her siblings are in their late [de-identified] and 80's. Advance Directive/HCPOA / DNR Status:      Final Arrangements: Per the sister the daughter has chosen [facility]      Harm to Self or Others: Pt is not a harm to herself or others      Bereavement Risk: Low risk. Plan for the Patient: Pt is nearing her demise. Death is expected at the hospice house.          Referrals Made: Volunteer
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
## 2019-05-23 NOTE — PROGRESS NOTES
Pt resting quietly in the bed with family present. Pt is unresponsive. Pt appears comfortable at this time. Will continue to monitor.

## 2019-05-23 NOTE — PROGRESS NOTES
Pt resting in bed call light within reach, even respirations noted, unlabored  breathing noted, family at bedside.

## 2019-05-23 NOTE — PROGRESS NOTES
Problem: Patient/Family/Caregiver Has Conflict  Goal: Verbalize understanding of physical changes and the reality of dying process  Description  The patient/family/caregiver will verbalize understanding of physical changes and the reality of the dying process. Outcome: Progressing Towards Goal  Note:   Family will be able to verbalize the individual fears they have related to the dying process. Problem: Patient/Family/Caregiver Has Conflict  Goal: Verbalize feelings on relationships, problems, and/or fears  Description  The patient will verbalize feelings on relationships, problems and/or fears. Outcome: Progressing Towards Goal  Note:   With the assistance of the LMSW the family will be able to address each other in a healthier manner during Kaity's time here. Problem: Emotional Support Needs  Goal: Patient/family is receiving emotional support  Outcome: Progressing Towards Goal  Note:   LMSW will assess the pt and family each visit to ensure they feel supported emotionally.

## 2019-05-23 NOTE — PROGRESS NOTES
Pt admitted to [facility] from [facility]. Pt responds only to pain by moaning and yelling out. Eyes remain closed at all times. IV access in left forearm is patent with no s/s of infiltration or irritation. Left medial heel has DTI. Right mid back near scapula has area approximately 1 cm in diameter. Right shin has dressing over healing wound which appears to be a skin tear. Sacrum is reddened. Barrier cream applied. Skin prep applied to heels. Marina cath inserted with clear light yellow urine. Pt settled in bed with tab alert on.     1220 Haldol 2 mg IV for agitation, glyco IV for secretions and coughing, Morphine 2 mg IV for pain. FLACC 3. Daughter and son-in-law present. Orientation to hospice house given and blue book given. Questions answered. 1300 Pt resting comfortably with no s/s of distress. 1625 Pt remains comfortable at this time. Family in room. 1759 Haldol 2 mg IV for agitation and Morphine 2 mg IV for pain. Pt repositioned and moaning loudly.      1900 Report given to oncoming RN

## 2019-05-23 NOTE — HOSPICE
128 Levine, Susan. \Hospital Has a New Name and Outlook.\"" Daily Visit      Status Codes I = Initiated C=Continued      I.  Volunteer     Goal: Hospice house volunteer (s) enhances the quality of remaining life while patient is at the hospice house. Interventions:      Patient/ Family/ Staff  requested ___X____ or Declined __________  Volunteer services. Alise Reid Rogers Volunteer (s) will provide companionship to the patient and/or family by visiting at the hospice house       . Alise Kessler Volunteer (s) will provide respite as needed when requested by patient and/or family. Alise Rogers  Volunteer will provide activities such as music, reading, pet therapy, etc. as requested. Alise Rogers  Comfort bag delivered. Any other special requests or information regarding volunteer services:    One visit is recorded for companionship and comfort bag delivery. No further needs identified at this time.

## 2019-05-23 NOTE — PROGRESS NOTES
Problem: Pressure Injury - Risk of  Goal: *Prevention of pressure injury  Description  Document Federico Scale and appropriate interventions in the flowsheet. Outcome: Progressing Towards Goal     Problem: Dyspnea Due to End of Life  Goal: Demonstrate understanding of and ability to manage respiratory symptoms at end of life  Outcome: Progressing Towards Goal     Problem: Pain  Goal: *Control of acute pain.  Pain managed effectively   Outcome: Progressing Towards Goal

## 2019-05-23 NOTE — PROGRESS NOTES
End of shift report given oncoming nurse pt resting in bed, call light within reach, bed locked and low position, pt respirations even and unlabored, no distress noted, family at bedside.

## 2019-05-23 NOTE — DISCHARGE SUMMARY
Hospitalist Discharge Summary Admit Date:  2019  5:20 PM  
Name:  Hilda Dumont Age:  80 y.o. 
:  1928 MRN:  [id number] PCP:  Rose Miller MD 
Treatment Team: Attending Provider: Sussy Fair MD; Primary Nurse: Darron Cristina, RN; Utilization Review: Afshin Mcdermott; Care Manager: Eunice Aleman Problem List for this Hospitalization: 
Hospital Problems as of 2019 Date Reviewed: 2018 Codes Class Noted - Resolved POA * (Principal) Acute metabolic encephalopathy [id number]: G93.41 
ICD-9-CM: 348.31  2019 - Present Yes Aspiration pneumonia of lower lobe ([address] ICD-10-CM: J69.0 ICD-9-CM: 507.0  2019 - Present Yes Mild dementia ICD-10-CM: F03.90 ICD-9-CM: 294.20  12/3/2014 - Present Yes Chronic systolic heart failure (HCC) (Chronic) ICD-10-CM: A27.70 ICD-9-CM: 428.22  Unknown - Present Yes Overview Signed 2019  3:50 PM by Geoff Daniels MD  
  Overview:  
s/p ICD ()/ EF: 20% Last Assessment & Plan:  
Formatting of this note may be different from the original. 
Stable, based on history, physical exam and review of pertinent labs, studies and medications; meds reconciled; continue current treatment plan, lifestyle modifications recommended, medication compliance emphasized. , This condition is managed by Specialist. 
Key CAD CHF Meds   
 
 carvedilol (COREG) 3.125 mg tablet  (Taking) TAKE ONE TABLET BY MOUTH TWICE A DAY WITH MEAL(S)  
 atorvastatin (LIPITOR) 10 mg tablet  (Taking) TAKE ONE TABLET BY MOUTH ONE TIME DAILY  
 amiodarone (CORDARONE) 200 mg tablet  (Taking) TAKE ONE-HALF TABLET BY MOUTH ONE TIME DAILY  
 clopidogrel (PLAVIX) 75 mg tab  (Taking) TAKE ONE TABLET BY MOUTH ONE TIME DAILY  
 furosemide (LASIX) 20 mg tablet  (Taking) One tab daily as needed  
 nitroglycerin (NITROSTAT) 0.4 mg SL tablet  (Taking) 1 Tab by SubLINGual route every five (5) minutes as needed. [id number](BNP,BNPP,BNPPPOC,HSCRP,NA,NAPOC,K,KPOCT,CHOL,CHOLPOCT,HDL,HDLPOC,LDLCHOL,LDLPOCT,LDLCPOC,LDLC,LDL,LDLCEXT,TRIGL,TGLPOCT,INR,INREXT,INRPOC,PTP,PTINR,PTEXT,DIG)@ Last Assessment & Plan:  
Stable. No edema. Continue current medical regimen. Hyperlipidemia (Chronic) ICD-10-CM: M61.7 ICD-9-CM: 272.4  Unknown - Present Yes S/P implantation of automatic cardioverter/defibrillator (AICD) (Chronic) ICD-10-CM: Z95.810 ICD-9-CM: V45.02  4/29/2012 - Present Yes Admission HPI from 5/13/2019:   
\" Patient is a 81yo F with hx HFrEF 20%, arythmia with AICD in place, mild dementia who presents with 4d worsening cough and shortness of breath. Seen in PCP office today, sent to ER for further eval. 
Wet cough, rare productive sputum. No fevers at home. No cp. Coughing spells with dyspnea but no sob at rest.  No leg edema, no orthopnea. Chronic debility and dementia, family desires to be able to bring her back home after discharge if possible. CXR in ER with evidence of RUL pneumonia. Mild leukocytosis. Initially significantly hypotensive and fluid bolus started, but pressure recovered significantly. \" 
 
Hospital Course: 
Pt admitted with PNA, likely from aspiration. She did not do well despite treatment, and family requested hospice consult, comfort measures which were initiated here in hospital.  She was accepted to hospice house and bed available today. Has been doing well with morphine 1mg IV q15min PRN and ativan 1mg q4h prn Disposition: Hospice/Medical Facility Activity: Activity as tolerated Diet: DIET PUREED 
DIET NUTRITIONAL SUPPLEMENTS All Meals; Magic Cups Follow up instructions, discharge meds at bottom of this note. Plan was discussed with pt, family at bedside. All questions answered. Patient was stable at time of discharge. Diagnostic Imaging/Tests:  
Xr Chest Pa Lat Result Date: 5/13/2019 Two view chest History: Coughing congestion times a couple of days. Comparison: 2018 Findings: The patient is rotated towards the right. A dual-chamber cardiac pacer/AICD device is present. There is a moderate-sized hiatal hernia. The heart and mediastinal silhouette are normal in size and configuration. There are mild patchy airspace opacities within the right upper lung zone. There are no pleural effusions. The pulmonary vascularity is within normal limits. The visualized osseous structures are unremarkable. Impression: Right lung airspace opacities would raise suspicion for pneumonia in the proper clinical setting. Echocardiogram results: 
Results for orders placed or performed during the hospital encounter of 19  
2D ECHO COMPLETE ADULT (TTE) W OR WO CONTR Narrative [address] [facility] [address] [place], [address] [facility] 
[phone] Transthoracic Echocardiogram 
2D, M-mode, Doppler, and Color Doppler Patient: Gustavo Figueroa 
MR #: [id number] : [id number] Age: 80 years Gender: Female Study date: 16-May-2019 Account #: [de-identified] Height: 60 in Weight: 100.8 lb 
BSA: 1.4 mï¾² Status:Routine Location: 828 BP: 120/ 57 Allergies: CELECOXIB, OTHER MEDICATION Sonographer:  Ace Bowden RD Group:  [facility] Referring Physician:  Llewellyn Lundborg, MD 
Reading Physician:  Nayeli Fan. Quintin Beltran MD [facility] [place] INDICATIONS: H/o of chronic CHF with no recent TTE on file. *Patient coughing constantly throughout study. * PROCEDURE: This was a routine study. A transthoracic echocardiogram was 
performed. The study included complete 2D imaging, M-mode, complete spectral 
Doppler, and color Doppler. Intravenous contrast (Definity) was administered. Echocardiographic views were limited by poor patient compliance and poor 
acoustic window availability. Image quality was adequate. LEFT VENTRICLE: Size was normal. Systolic function was mildly reduced. Ejection 
fraction was estimated in the range of 40 % to 45 %. There was hypokinesis of 
the mid anteroseptal, apical septal, and apical wall(s). Wall thickness was 
normal. The E/e' ratio was 25.65. There was Grade II Diastolic Dysfunction. RIGHT VENTRICLE: The size was normal. Systolic function was normal. Estimated 
peak pressure was in the range of 40-45 mmHg. LEFT ATRIUM: The atrium was mildly dilated. RIGHT ATRIUM: Size was normal. 
 
SYSTEMIC VEINS: IVC: The inferior vena cava was normal in size and course. AORTIC VALVE: The valve was structurally normal, tri-commissural. There was  
no 
evidence for stenosis. There was trivial regurgitation. MITRAL VALVE: There was mild annular calcification. There was no evidence for 
stenosis. There was trivial regurgitation. TRICUSPID VALVE: The valve structure was normal. There was no evidence for 
stenosis. There was mild to moderate regurgitation. PULMONIC VALVE: The valve structure was normal. There was no evidence for 
stenosis. There was mild regurgitation. PERICARDIUM: There was no pericardial effusion. AORTA: The root exhibited normal size. SUMMARY: 
 
-  Left ventricle: Systolic function was mildly reduced. Ejection fraction  
was 
estimated in the range of 40 % to 45 %. There was hypokinesis of the mid 
anteroseptal, apical septal, and apical wall(s). -  Left atrium: The atrium was mildly dilated. -  Mitral valve: There was mild annular calcification. 
 
-  Tricuspid valve: There was mild to moderate regurgitation. SYSTEM MEASUREMENT TABLES 
 
2D mode AoR Diam (2D): 3.6 cm 
LA Dimension (2D): 3.6 cm Left Atrium Systolic Volume Index; Method of Disks, Biplane; 2D mode;: 34.3 
ml/m2 IVS/LVPW (2D): 1.3 IVSd (2D): 1.2 cm LVIDd (2D): 3.7 cm 
LVIDs (2D): 2.3 cm 
LVOT Area (2D): 3.1 cm2 LVPWd (2D): 0.9 cm Tissue Doppler Imaging LV Peak Early Lehman Tissue Jeb; Lateral MA (TDI): 5.2 cm/s LV Peak Early Lehman Tissue Jeb; Medial MA (TDI): 3.5 cm/s Unspecified Scan Mode Peak Grad; Mean; Antegrade Flow: 5 mm[Hg] Vmax; Antegrade Flow: 112 cm/s LVOT Diam: 2 cm 
MV Peak Jeb/LV Peak Tissue Jeb E-Wave; Lateral MA: 20.8 MV Peak Jeb/LV Peak Tissue Jeb E-Wave; Medial MA: 30.5 MV E/A: 0.9 MV Peak E Jeb; Mean; Antegrade Flow: 107 cm/s Vmax; Regurgitant Flow: 320 cm/s Prepared and signed by 
 
Soheila Petit. Stephany Warren MD [facility] Signed 16-May-2019 16:33:10 Procedures done this admission: * No surgery found * All Micro Results Procedure Component Value Units Date/Time Lucrecia Oquendo [[id number]] Collected:  05/19/19 1749 Order Status:  Completed Specimen:  Urine from Clean catch Updated:  05/22/19 4857 Special Requests: NO SPECIAL REQUESTS Culture result: NO GROWTH 2 DAYS     
 CULTURE, BLOOD [[id number]] Collected:  05/13/19 1751 Order Status:  Completed Specimen:  Blood Updated:  05/18/19 2046 Special Requests: --     
  LEFT 
FOREARM Culture result: NO GROWTH 5 DAYS     
 CULTURE, BLOOD [[id number]] Collected:  05/13/19 1804 Order Status:  Completed Specimen:  Blood Updated:  05/18/19 5295 Special Requests: --     
  RIGHT 
HAND Culture result: NO GROWTH 5 DAYS Labs: Results:  
   
BMP, Mg, Phos Recent Labs  
  05/21/19 
0537   
K 4.0  
 CO2 30 AGAP 7  
BUN 20  
CREA 1.08* CA 9.3 * CBC Recent Labs  
  05/21/19 
0537 WBC 10.8 RBC 2.89* HGB 8.2* HCT 26.6*  
 GRANS 74 LYMPH 12* EOS 2 MONOS 11 BASOS 0 IG 1 ANEU 8.0 ABL 1.3 REGULO 0.2 ABM 1.1 ABB 0.0 AIG 0.1 LFT No results for input(s): SGOT, ALT, TBIL, AP, TP, ALB, GLOB, AGRAT, GPT in the last 72 hours. Cardiac Testing Lab Results Component Value Date/Time   (H) 05/13/2019 04:07 PM  
  02/21/2017 07:09 AM  
  02/20/2017 07:08 AM  
 BNP 97 04/23/2014 12:20 PM  
 B-type Natriuretic Peptide 407.4 (H) 09/23/2014 09:06 AM  
 CK 65 09/11/2018 05:28 PM  
 Troponin-I <0.05 04/28/2012 08:38 PM  
 Troponin-I <0.05 04/11/2012 12:03 PM  
 Troponin-I <0.05 11/07/2010 03:46 PM  
 Troponin-I, Qt. <0.02 (L) 05/13/2019 04:07 PM  
 Troponin-I, Qt. 0.05 11/08/2010 07:45 AM  
 Troponin-I, Qt. 0.05 11/08/2010 12:05 AM  
  
Coagulation Tests Lab Results Component Value Date/Time Prothrombin time 13.1 09/11/2018 05:28 PM  
 Prothrombin time 10.7 08/06/2017 07:05 PM  
 Prothrombin time 11.1 02/19/2017 12:17 AM  
 INR 1.0 09/11/2018 05:28 PM  
 INR 1.0 08/06/2017 07:05 PM  
 INR 1.0 02/19/2017 12:17 AM  
 aPTT 24.0 09/11/2018 05:28 PM  
 aPTT 22.9 (L) 02/19/2017 12:17 AM  
 aPTT 23.1 (L) 01/05/2011 12:34 PM  
  
A1c No results found for: HBA1C, HGBE8, DRJ8AOGA, THN5PXFP Lipid Panel Lab Results Component Value Date/Time Cholesterol, total 132 03/18/2019 01:09 PM  
 HDL Cholesterol 59 03/18/2019 01:09 PM  
 LDL, calculated 61 03/18/2019 01:09 PM  
 VLDL, calculated 12 03/18/2019 01:09 PM  
 Triglyceride 59 03/18/2019 01:09 PM  
  
Thyroid Panel Lab Results Component Value Date/Time TSH 9.070 (H) 03/18/2019 01:09 PM  
 TSH 3.880 03/31/2016 08:56 AM  
 T4, Total 10.2 03/31/2016 08:56 AM  
 T4, Total 9.8 04/01/2015 11:45 AM  
    
Most Recent UA Lab Results Component Value Date/Time  Color YELLOW 05/19/2019 05:48 PM  
 Appearance CLOUDY 05/19/2019 05:48 PM  
 Specific gravity 1.007 05/19/2019 05:48 PM  
 pH (UA) 5.0 05/19/2019 05:48 PM  
 Protein NEGATIVE  05/19/2019 05:48 PM  
 Glucose NEGATIVE  05/19/2019 05:48 PM  
 Ketone NEGATIVE  05/19/2019 05:48 PM  
 Bilirubin NEGATIVE  05/19/2019 05:48 PM  
 Blood TRACE (A) 05/19/2019 05:48 PM  
 Urobilinogen 0.2 05/19/2019 05:48 PM  
 Nitrites NEGATIVE  05/19/2019 05:48 PM  
 Leukocyte Esterase NEGATIVE  05/19/2019 05:48 PM  
 WBC 0 05/19/2019 05:48 PM  
 RBC 5-10 05/19/2019 05:48 PM  
 Epithelial cells 0-3 05/19/2019 05:48 PM  
 Bacteria 0 05/19/2019 05:48 PM  
 Casts 0-3 05/19/2019 05:48 PM  
 Crystals, urine 0 11/07/2010 03:45 PM  
 Mucus 0 11/07/2010 03:45 PM  
 Other observations MODERATE WBC CLUMPS 11/07/2010 03:45 PM  
  
 
Allergies Allergen Reactions  Celebrex [Celecoxib] Other (comments) Nausea and dyspepsia  Other Medication Hives and Itching Unknown antibiotic in 1992 Immunization History Administered Date(s) Administered  Influenza Vaccine 09/24/2010, 01/01/2013, 09/24/2014, 10/08/2015  Influenza Vaccine (Quad) Mdck Pf 10/03/2017  Influenza Vaccine (Quad) PF 11/10/2016, 09/06/2018  Pneumococcal Polysaccharide (PPSV-23) 02/23/2018  Pneumococcal Vaccine (Unspecified Type) 01/01/2013  TB Skin Test (PPD) Intradermal 04/23/2014, 02/18/2017, 08/06/2017, 09/11/2018, 05/14/2019 All Labs from Last 24 Hrs: 
No results found for this or any previous visit (from the past 24 hour(s)). Current Med List in Hospital:  
Current Facility-Administered Medications Medication Dose Route Frequency  LORazepam (ATIVAN) injection 1 mg  1 mg IntraVENous Q4H PRN  
 morphine injection 1 mg  1 mg IntraVENous Q15MIN PRN  
 albuterol-ipratropium (DUO-NEB) 2.5 MG-0.5 MG/3 ML  3 mL Nebulization Q6H RT Discharge Exam: 
Patient Vitals for the past 24 hrs: 
 Temp Pulse Resp BP SpO2  
05/23/19 0800     93 % 05/23/19 0738 97.9 °F (36.6 °C) 93 17 119/56   
05/23/19 0123     90 % 05/22/19 1935 99.9 °F (37.7 °C) 77 20 111/59 99 % 05/22/19 1912     93 % 05/22/19 1557 98.1 °F (36.7 °C) 94 18 96/47   
05/22/19 1351     95 % 05/22/19 1200 98.4 °F (36.9 °C) 75 21 93/45 93 % Oxygen Therapy O2 Sat (%): 93 % (05/23/19 0800) Pulse via Oximetry: 80 beats per minute (05/23/19 0800) O2 Device: Nasal cannula (05/23/19 0800) O2 Flow Rate (L/min): 4 l/min (05/23/19 0800) O2 Temperature: 35.6 °F (2 °C) (05/17/19 2043) FIO2 (%): 28 % (05/23/19 0123) Intake/Output Summary (Last 24 hours) at 5/23/2019 3939 Last data filed at 5/22/2019 1813 Gross per 24 hour Intake 240 ml Output  Net 240 ml  
   
*Note that automatically entered I/Os may not be accurate; dependent on patient compliance with collection and accurate  by assistants. General:      Cnofused. frail Eyes:   Normal sclera. Extraocular movements intact. ENT:  Normocephalic, atraumatic. Moist mucous membranes CV:   Regular rate and rhythm. No murmur, rub, or gallop. Lungs:  Coarse BS bilat Extremities: Warm and dry. No cyanosis or edema. Neurologic: CN II-XII grossly intact. Sensation intact. Skin:     No rashes or jaundice. Psych:  Normal mood and affect. Discharge Info:  
Current Discharge Medication List  
  
STOP taking these medications  
  
 atorvastatin (LIPITOR) 10 mg tablet Comments:  
Reason for Stopping:   
   
 carvedilol (COREG) 3.125 mg tablet Comments:  
Reason for Stopping:   
   
 levothyroxine (SYNTHROID) 50 mcg tablet Comments:  
Reason for Stopping:   
   
 amiodarone (PACERONE) 100 mg tablet Comments:  
Reason for Stopping:   
   
 potassium chloride (K-DUR, KLOR-CON) 20 mEq tablet Comments:  
Reason for Stopping:   
   
 calcium-vitamin D (OYSTER SHELL) 500 mg(1,250mg) -200 unit per tablet Comments:  
Reason for Stopping:   
   
 acetaminophen (TYLENOL) 500 mg tablet Comments:  
Reason for Stopping:   
   
 nitroglycerin (NITROSTAT) 0.4 mg SL tablet Comments:  
Reason for Stopping:   
   
 docusate sodium (COLACE) 100 mg capsule Comments:  
Reason for Stopping:   
   
 albuterol (PROVENTIL HFA, VENTOLIN HFA, PROAIR HFA) 90 mcg/actuation inhaler Comments:  
Reason for Stopping: Follow Up Orders: No orders of the defined types were placed in this encounter. Follow-up Information Follow up With Specialties Details Why Contact Info [address] [place] today hospice care [id number] [facility] [address] [place] [id number] [phone] Time spent in patient discharge planning and coordination 35 minutes.  
 
Signed: 
Jason Baca MD

## 2019-05-23 NOTE — PROGRESS NOTES
Patient will discharge at 11 am to [facility]. Family at bedside made aware of transport time.

## 2019-05-23 NOTE — PROGRESS NOTES
Pt bedside report received from [facility], pt  in bed sleeping family at bedside assessment completed, pt continue on  comfort care, bed on low and locked position with floor free of objects,  call light within reach, pt on oxygen  @2L NC, respirations even and non labored,no distress noted.

## 2019-05-24 NOTE — PROGRESS NOTES
VALERIESW brought a cart of quilts made by the volunteers by the pt's room. The daughter was present and she picked one that she liked.

## 2019-05-24 NOTE — PROGRESS NOTES
Progress Note    Patient: Donn Diggs MRN: [id number]  SSN: [id number]    YOB: 1928  Age: 80 y.o. Sex: female      Admit Date: 5/23/2019    LOS: 1 day     Subjective:     Unresponsive except to painful stimuli. NPO. Has required Morphine IV x 5 for pain, haldol IV x 5 for agitation and glycopyrrolate x 1 for secretions. Family at bedside. Review of Systems:  Review of systems not obtained due to patient factors. Objective:     Vitals:    05/23/19 1200 05/24/19 0517   BP: 119/57 107/52   Pulse: 78 76   Resp: 24 18   Temp: 97.3 °F (36.3 °C) 99.9 °F (37.7 °C)        Intake and Output:  Current Shift: No intake/output data recorded. Last three shifts: 05/22 1901 - 05/24 0700  In: -   Out: 500 [Urine:500]    Physical Exam:   GENERAL: mild distress, appears older than stated age, unresponsive  LUNG: Coarse breath sounds with rhonchi and labored respirations. HEART: regular rate and rhythm, no MRG. ABDOMEN: soft, non-tender. Bowel sounds hypoactive. : Marina catheter with 500ml urine output. EXTREMITIES:  extremities with no cyanosis or edema  SKIN: Pale. Cool to touch. Stage 2 wound to right scapula, and DTI to right inner heel. NEUROLOGIC: Unresponsive. Unable to participate in exam. Bedbound. Lab/Data Review:  No new labs resulted in the last 24 hours.     Assessment:     Principal Problem:    Chronic systolic heart failure (HCC) ()        Active Problems:    Mild dementia (12/3/2014)      Aspiration pneumonia of lower lobe (HCC) (8/73/0149)      Systolic CHF ([facility] [address] (4/62/0384)        Plan:     Current Facility-Administered Medications   Medication Dose Route Frequency    haloperidol lactate (HALDOL) injection 2 mg  2 mg IntraVENous Q1H PRN    sodium chloride (NS) flush 3 mL  3 mL IntraVENous PRN    morphine injection 2 mg  2 mg SubCUTAneous Q20MIN PRN    Or    morphine injection 2 mg  2 mg IntraVENous Q20MIN PRN    acetaminophen (TYLENOL) suppository 650 mg  650 mg Rectal Q3H PRN    albuterol-ipratropium (DUO-NEB) 2.5 MG-0.5 MG/3 ML  3 mL Nebulization Q4H PRN    glycopyrrolate (ROBINUL) injection 0.2 mg  0.2 mg SubCUTAneous Q4H PRN    LORazepam (ATIVAN) injection 1 mg  1 mg IntraVENous Q2H PRN    sodium chloride (NS) flush 3 mL  3 mL IntraVENous Q12H       Admitted GIP with Chronic systolic CHF for management of pain, dyspnea and agitation. 1. Pain: Morphine 2mg IV/SQ Q20 minutes as needed. 2. Dyspnea: Morphine 2mg IV/SQ Q20 minutes as needed. Duonebs q4 prn. Glycopyrrolate 0.2mg q4 prn secretions. Oxygen at 2 L/min prn.    3. Agitation: Haloperidol 2mg IV/SQ Q1 hour prn and Lorazepam 1mg IV/IM q2 hours prn.    4. Family/Pt Support: Family at bedside during exam. Medications and plan of care discussed with nursing staff and family. Will continue to monitor for symptoms and adjust medications as needed to maintain patient comfort. PPS 10%. Case discussed with Dr. Azucena Dowling and in [facility] meeting today. Add scheduled haldol 2mg IV/SQ q8.        Signed By: Arline Alcaraz NP     May 24, 2019

## 2019-05-24 NOTE — PROGRESS NOTES
Abigail Gray is a 80 y.o. female who has developed metabolic encephalopathy secondary to acute on chronic LV systolic dysfunction associated congestive heart failure().  Community acquired left lower lobe pneumonia is a related precipitating diagnosis. Background: Patient has been a  since 12. She has one daughter, Mariam Jordan who is  to [place]. They have a son, Patrick Davies. According to Shruti patient has a son prior to Mariam Jordan who  at 4 days old. Patient worked at [employer] for many years. She retired from work before she was eligable for social security due to health issues. Patient is a  Sikh and member of [employer]. Due to her health she has not been actively involved in the [employer] in a couple of years. Shruti says the [employer] has stayed in touch with them but it has been sporadic. Shruti is unable to work due to a disability. Assessment: Patient appears to be obtunded. 's conversation was with Mariam Jordan the daughter. It was a difficult conversation due to hSruti's ability to stay on task. Her thoughts were quite rambling with various subjects.  affirmed her love for mom.  assured Shruti her mother would have excellent care.  offered prayer. Plan:  will continue to provide spiritual and emotional support throughout patient's time with [facility]. See care plan for details.

## 2019-05-24 NOTE — PROGRESS NOTES
Report received. Pt round. Pt identified by name. In bed with eyes closed. No s/s of pain, agitation or dyspnea. Bed low and SR up with tab alerts on. Family at bedside. 4624 Morphine 2 mg IV for pain and Haldol 2 mg IV for agitation. Sister at bedside. 1038 Morphine 2 mg IV for pain and Ativan 1 mg IV for agitation after bath and AM care. 1330 Called to room by daughter who was concerned about pt's breathing being \"too shallow\". Pt with regular respirations and no s/s of dyspnea or apnea. Reassured daughter. 1804 Morphine 2 mg IV for pain. FLACC 4    1845 Pt resting with no s/s of distress. Report given to oncoming RN.

## 2019-05-24 NOTE — PROGRESS NOTES
Problem: Emotional Support Needs  Goal: Patient/family is receiving emotional support  Outcome: Progressing Towards Goal     Problem: Patient/Family/Caregiver Has Conflict  Goal: Verbalize feelings on relationships, problems, and/or fears  Description  The patient will verbalize feelings on relationships, problems and/or fears. Outcome: Progressing Towards Goal     Problem: Patient/Family/Caregiver Has Conflict  Goal: Verbalize understanding of physical changes and the reality of dying process  Description  The patient/family/caregiver will verbalize understanding of physical changes and the reality of the dying process.   Outcome: Progressing Towards Goal

## 2019-05-24 NOTE — PROGRESS NOTES
Problem: Anticipatory Grief  Goal: Grief heard and acknowledged, anxiety reduced, patient coping identified, patient/family expressed gratitude  Outcome: Progressing Towards Goal  Note:   Patient obtunded. Daughter says she is ready for mom to go. Problem: Spiritual Evaluation  Goal: Identify beliefs/practices that support hospice experience  Description  Patient/family identify their beliefs/practices that impair Hospice experience. Patient/family identify their beliefs/practices that support Hospice experience. Patient coping identified. Spiritual distress identified and decreased with visit.   Outcome: Progressing Towards Goal  Note:   [address] identified

## 2019-05-24 NOTE — HOSPICE
Patient administered Morphine 2mg IVP and Haldol 2mg IVP at 2101 and 0205 during this shift. Patient currently with FLACC score of 0/10. No other discomfort or distress observed. Patient's sister remained at bedside throughout the night.

## 2019-05-24 NOTE — [facility] IDG MASTER NOTE
Hospice Interdisciplinary Group Collaborative  Date: 05/24/19  Time: 1:26 PM    ___________________    Patient: Donn Diggs      ___________________    Diagnoses:  Diagnoses of Aspiration pneumonia of left lower lobe due to vomit [facility]), Chronic systolic heart failure ([facility] [address] and Mild dementia were pertinent to this visit. Current Medications:    Current Facility-Administered Medications:     haloperidol lactate (HALDOL) injection 2 mg, 2 mg, IntraVENous, Q1H PRN, Rick Padron, BASIA    haloperidol lactate (HALDOL) injection 2 mg, 2 mg, IntraVENous, Q8H **OR** haloperidol lactate (HALDOL) injection 2 mg, 2 mg, SubCUTAneous, Q8H, Danilo Rojas, NP    sodium chloride (NS) flush 3 mL, 3 mL, IntraVENous, PRN, Rubin Armendariz MD, 3 mL at 05/24/19 1039    morphine injection 2 mg, 2 mg, SubCUTAneous, Q20MIN PRN **OR** morphine injection 2 mg, 2 mg, IntraVENous, Q20MIN PRN, Rubin Armendariz MD, 2 mg at 05/24/19 1038    acetaminophen (TYLENOL) suppository 650 mg, 650 mg, Rectal, Q3H PRN, Rubin Armendariz MD    albuterol-ipratropium (DUO-NEB) 2.5 MG-0.5 MG/3 ML, 3 mL, Nebulization, Q4H PRN, Rubin Armendariz MD    glycopyrrolate (ROBINUL) injection 0.2 mg, 0.2 mg, SubCUTAneous, Q4H PRN, Rubin Armendariz MD, 0.2 mg at 05/23/19 1220    LORazepam (ATIVAN) injection 1 mg, 1 mg, IntraVENous, Q2H PRN, Rubin Armendariz MD, 1 mg at 05/24/19 1038    sodium chloride (NS) flush 3 mL, 3 mL, IntraVENous, Q12H, Rubin Armendariz MD, 3 mL at 05/24/19 0911    Orders:  Orders Placed This Encounter    IP CONSULT TO SPIRITUAL CARE     Standing Status:   Standing     Number of Occurrences:   1     Order Specific Question:   Reason for Consult: Answer: Once on week one, then PRN. For [facility] Only. For contracted patients, their primary hospice will continue to manage spiritual care needs.     DIET PLEASURE     Standing Status:   Standing     Number of Occurrences:   1     Order Specific Question: Likes/Dislikes/Preferences     Answer:   hold tray    VITAL SIGNS     Standing Status:   Standing     Number of Occurrences:   1    NURSING-MISCELLANEOUS: Comfort Care Measures CONTINUOUS     Standing Status:   Standing     Number of Occurrences:   1     Order Specific Question:   Description of Order:     Answer:   Comfort Care Measures    JUÁREZ CATHETER, CARE     1. Juárez Catheter care every shift and PRN  2. Notify Physician of Juárez Catheter leakage, occlusion, gross adherent sediment or accidental removal  3. Change Juárez 30 days after insertion. Standing Status:   Standing     Number of Occurrences:   1    BLADDER CHECKS     May scan bladder PRN for urinary retention and or patient discomfort     Standing Status:   Standing     Number of Occurrences:   1    PAIN ASSESSMENT Pain and Symptoms: Assess ever 4 hours and PRN, for GIP level of care. PRN Routine     Standing Status:   Standing     Number of Occurrences:   1     Order Specific Question:   Please describe the test or procedure you would like to order. Answer:   Pain and Symptoms: Assess ever 4 hours and PRN, for GIP level of care.  BEDREST, COMPLETE     Standing Status:   Standing     Number of Occurrences:   1    NURSING-MISCELLANEOUS: SUMMARY: 44-year-old woman who has developed metabolic encephalopathy secondary to acute on chronic LV systolic dysfunction associated congestive heart failure(). Community acquired left lower lobe pneumonia is a rel. .. 44-year-old woman who has developed metabolic encephalopathy secondary to acute on chronic LV systolic dysfunction associated congestive heart failure(). Community acquired left lower lobe pneumonia is a related precipitating diagnosis.   Microcytic anemia of chronic disease, hypoalbuminemia (2.4 g per DL), ischemic cardiomyopathy (With LVEF 20%), and long-standing vascular dementia with Behavioral disturbance requiring scheduled antipsychotic medication (Seroquel 25 mg daily at bedtime) are other diagnoses contributing to the severity of this patient's metabolic encephalopathy. 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 The patient has effectively ceased to eat, drink or take oral medication. Despite appropriate intervention for congestive heart failure, hypotension and community-acquired pneumonia in hospital.  On day 3 in hospital.  The patient's daughter, Amy Sheffield, has elected on her behalf to stop life extending interventions and to confined further care to comfort measures only. Under these conditions patient's life expectancy is less than 1 week. She will continue to require parenteral opioid and psychotropic medication for comfort, during that interval.     Standing Status:   Standing     Number of Occurrences:   1     Order Specific Question:   Description of Order:     Answer:   SUMMARY:    WOUND CARE, DRESSING CHANGE     Wound Care:  Location: right scapula  Decubitus Wound Stage II (Cavalon)- Cleanse wound location with wound cleanser, pat dry and apply Cavilon Durable Barrier Cream every 12 hours and PRN if soiled. Turn every 2 hours. Assess with each nursing assessment visit. Standing Status:   Standing     Number of Occurrences:   29    WOUND CARE, DRESSING CHANGE     Wound Care:  Location: right inner heel   DTI: Please apply skin prep to bilateral heels bid and prn. Float heels on pillow. Turn and reposition as needed. Assess every shift and PRN. Standing Status:   Standing     Number of Occurrences:   29    DO NOT RESUSCITATE     Standing Status:   Standing     Number of Occurrences:   1     Order Specific Question:   Comfort Measures Only? Answer:    Yes    OXYGEN CANNULA Liters per minute: 2; Indications for O2 therapy: RESPIRATORY DISTRESS PRN Routine     Standing Status:   Standing     Number of Occurrences:   1     Order Specific Question:   Liters per minute: Answer:   2     Order Specific Question:   Indications for O2 therapy     Answer:   RESPIRATORY DISTRESS    sodium chloride (NS) flush 3 mL    DISCONTD: morphine (ROXANOL) concentrated oral syringe 10 mg    DISCONTD: morphine (ROXANOL) concentrated oral syringe 10 mg    OR Linked Order Group     morphine injection 2 mg     morphine injection 2 mg    DISCONTD: acetaminophen (TYLENOL) tablet 650 mg    acetaminophen (TYLENOL) suppository 650 mg    DISCONTD: senna (SENOKOT) tablet 8.6 mg    DISCONTD: loperamide (IMODIUM) capsule 4 mg    albuterol-ipratropium (DUO-NEB) 2.5 MG-0.5 MG/3 ML     Order Specific Question:   MODE OF DELIVERY     Answer:   Nebulizer    DISCONTD: hyoscyamine SL (LEVSIN/SL) tablet 0.125 mg    glycopyrrolate (ROBINUL) injection 0.2 mg    LORazepam (ATIVAN) injection 1 mg    DISCONTD: haloperidol lactate (HALDOL) injection 2 mg    sodium chloride (NS) flush 3 mL    haloperidol lactate (HALDOL) injection 2 mg    OR Linked Order Group     haloperidol lactate (HALDOL) injection 2 mg     haloperidol lactate (HALDOL) injection 2 mg    INITIAL PHYSICIAN ORDER: HOSPICE Level Of Care: General Inpatient; Reason for Admission: CHF, aspiration pneumonia and vascular dementia for symptomatic management     Standing Status:   Standing     Number of Occurrences:   1     Order Specific Question:   Status     Answer:   Hospice     Order Specific Question:   Level Of Care     Answer:   General Inpatient     Order Specific Question:   Reason for Admission     Answer:   CHF, aspiration pneumonia and vascular dementia for symptomatic management     Order Specific Question:   Inpatient Hospitalization Certified Necessary for the Following Reasons     Answer:   3.  Patient receiving treatment that can only be provided in an inpatient setting (further clarification in H&P documentation)     Order Specific Question:   Admitting Diagnosis     Answer:   Systolic CHF [facility]) [[id number]]     Order Specific Question:   Terminal Prognosis Diagnosis(es)     Answer:   Systolic CHF [facility]) [[id number]]     Order Specific Question:   Admitting Physician     Answer:   Lamar Naylor     Order Specific Question:   Attending Physician     Answer:   Emily Stack [[id number]]    IP CONSULT TO SOCIAL WORK     Standing Status:   Standing     Number of Occurrences:   1     Order Specific Question:   Reason for Consult: Answer: For [facility] Only. For contracted patients, their primary hospice will continue to manage social work needs. Allergies:   Allergies   Allergen Reactions    Celebrex [Celecoxib] Other (comments)     Nausea and dyspepsia    Other Medication Hives and Itching     Unknown antibiotic in 1992       Care Plan:  Multidisciplinary Problems (Active)     Problem: Dyspnea Due to End of Life     Dates: Start: 05/23/19       Description:     Disciplines: Interdisciplinary    Goal: Demonstrate understanding of and ability to manage respiratory symptoms at end of life     Dates: Start: 05/23/19       Description:     Disciplines: Interdisciplinary    Intervention: Assess for signs and symptoms of dyspnea     Dates: Start: 05/23/19       Description:           Intervention: Instruct on causes/symptoms of dyspnea     Dates: Start: 05/23/19       Description:           Intervention: Carolyn Mcnulty patient/caregiver/family on strategies to effectively manage dyspnea     Dates: Start: 05/23/19       Description:                       Problem: Emotional Support Needs     Dates: Start: 05/23/19       Description: Each visit     Disciplines: Interdisciplinary    Goal: Patient/family is receiving emotional support     Dates: Start: 05/23/19   Expected End: 05/30/19       Description:     Disciplines: Interdisciplinary    Intervention: Assess for emotional distress     Dates: Start: 05/23/19       Description:           Intervention: Provide emotional support     Dates: Start: 05/23/19       Description:           Intervention: Provide emotional support of the family's cultural expressions of grief and loss     Dates: Start: 05/23/19       Description: Provide emotional support of the family's cultural expression of grief, loss, and response to illness. Problem: Falls - Risk of     Dates: Start: 05/24/19       Description:     Disciplines: Interdisciplinary    Goal: *Absence of Falls     Dates: Start: 05/24/19       Description: Document Chad Wright Fall Risk and appropriate interventions in the flowsheet.     Disciplines: Interdisciplinary                Problem: Pain     Dates: Start: 05/23/19       Description:     Disciplines: Interdisciplinary    Goal: *Control of acute pain     Dates: Start: 05/23/19       Description:     Disciplines: Interdisciplinary    Intervention: Assess pain characteristics (eg: Intensity scale; onset; location; quality; severity; duration; frequency; radiation)     Dates: Start: 05/23/19       Description:           Intervention: Assess pain management - barriers (eg: Past pain experiences)     Dates: Start: 05/23/19       Description:           Intervention: Identify pain expectations (eg: Patient's pain goal; somatic experiences; behavioral changes; affect)     Dates: Start: 05/23/19       Description:           Intervention: Identify pain medication concerns (eg: Cultural considerations; addiction concerns)     Dates: Start: 05/23/19       Description:           Intervention: Support system identification (eg: Caregiver; community resource; family; friends; Mu-ism; support group)     Dates: Start: 05/23/19       Description:           Intervention: Monitor for change in patient condition (eg:  Vital signs changes; changes in level of consciousness; nausea; behavioral changes)     Dates: Start: 05/23/19       Description:           Intervention: Medication side-effect assessment     Dates: Start: 05/23/19       Description:           Intervention: Pain-relief response reassessment (eg: Frequency based on route of administration; effectiveness)     Dates: Start: 05/23/19       Description:                       Problem: Patient Education: Go to Patient Education Activity     Dates: Start: 05/23/19       Description:     Disciplines: Interdisciplinary    Goal: Patient/Family Education     Dates: Start: 05/23/19   Expected End: 06/06/19       Description:     Disciplines: Interdisciplinary                Problem: Patient Education: Go to Patient Education Activity     Dates: Start: 05/24/19       Description:     Disciplines: Interdisciplinary    Goal: Patient/Family Education     Dates: Start: 05/24/19       Description:     Disciplines: Interdisciplinary                Problem: Patient/Family/Caregiver Has Conflict     Dates: Start: 05/23/19       Description: Each visit    Disciplines: Interdisciplinary    Goal: Verbalize feelings on relationships, problems, and/or fears     Dates: Start: 05/23/19   Expected End: 05/30/19       Description: The patient will verbalize feelings on relationships, problems and/or fears. Disciplines: Interdisciplinary    Intervention: Encourage verbalization of feelings, perceptions, fears, stressors     Dates: Start: 05/23/19       Description: Encourage patient/caregiver to verbalize feelings, perceptions, fears, and stressors. Goal: Verbalize understanding of physical changes and the reality of dying process     Dates: Start: 05/23/19   Expected End: 05/30/19       Description: The patient/family/caregiver will verbalize understanding of physical changes and the reality of the dying process.     Disciplines: Interdisciplinary    Intervention: Assess impact of loss upon self and others     Dates: Start: 05/23/19       Description: Assess impact of loss upon the patient/caregiver          Intervention: Encourage verbalization of feelings, perceptions, fears, stressors     Dates: Start: 05/23/19       Description: Encourage patient/caregiver to verbalize feelings, perceptions, fears, and stressors. Problem: Pressure Injury - Risk of     Dates: Start: 05/23/19       Description:     Disciplines: Interdisciplinary    Goal: *Prevention of pressure injury     Dates: Start: 05/23/19   Expected End: 06/06/19       Description: Document Federico Scale and appropriate interventions in the flowsheet. Disciplines: Interdisciplinary                    ___________________    Care Team Notes          POC/IDG Notes      [facility] IDG Nurse Notes by Ishmael Conner RN at 05/24/19 1326  Version 1 of 1    Author:  Ishmael Conner RN Service:  Diane Lincoln Author Type:  Registered Nurse    Filed:  05/24/19 1326 Date of Service:  05/24/19 1326 Status:  Signed    :  Ishmael Conner RN (Registered Nurse)       Patient: Donn Diggs    Date: 05/24/19  Time: 1:26 PM    1st IDG: Pt is a 80-year-old female with CHF for control of pain and agitation. Pt is responsive to pain. NPO. DTI on right inner heel, stage II to right shoulder blade. Marina placed on 5/23. Morphine 2 mg x 5, Haldol x 5, Ativan x 1, glycol x 1 in past 24 hours. Pts left arm is painful to touch. Plan: schedule Haldol 2 mg IV/SQ q 8 hours. Discontinue all PO meds and hold tray. Comprehensive plan of care reviewed. IDG and pt./family in agreement with plan of care. The IDG identifies through on-going assessment when a change is needed to the POC; the pt/family will receive care and services necessitated by changes in POC. Medications reviewed by the pharmacist and Medical Director.         Signed by: Jaenine Sanon RN       [address] IDG  Notes by Jaswinder Boyer at 05/24/19 5491  Version 1 of 1    Author:  Jaswinder Boyer Service:  Spiritual Care Author Type:  Pastoral Care    Filed:  05/24/19 1209 Date of Service:  05/24/19 1208 Status:  Signed    :  Carleen Roebrson (Pastoral Care)       Patient: Kiarra Flowers    Date: 05/24/19  Time: 12:08 PM    [facility]  Notes    Assessment pending for spiritual care and bereavement. Signed by: Mendez HALL [facility] IDG  Notes by Johanna Boxer at 05/24/19 1042  Version 1 of 1    Author:  Johanna Boxer Service:  Licensed Clinical  Author Type:      Filed:  05/24/19 1207 Date of Service:  05/24/19 1042 Status:  Signed    :  Johanna Boxer ()       Patient: Kiarra Flowers    Date: 05/24/19  Time: 10:42 AM    [facility]  Notes:    Progressing to her goals. LMSW will continue to provide emotional support. There is a lot of tension with in the family. Daughter has chosen [facility]. Signed by: Elizabeth Solis       [facility] IDG Volunteer Notes by Sahil Gordon at 05/24/19 1136  Version 1 of 1    Author:  Sahil Gordon Service:  Faith Canseco Author Type:  Hospice Volunteer/    Filed:  05/24/19 1136 Date of Service:  05/24/19 1136 Status:  Signed    :  Sahil Gordon (Hospice Volunteer/)       Patient: Kiarra Flowers    Date: 05/24/19  Time: 11:36 AM    [facility] Volunteer Notes  Volunteer - Hospice Interdisciplinary Plan of Care Note     Status Codes I = Initiated C=Continued R=Revised RS = Resolved      I.  Volunteer         Volunteer Assignment Information: Volunteers will continue to offer visits for companionship, harp music, snacks to the room, and pet therapy.                 Signed by: Henrique Parnell

## 2019-05-24 NOTE — HOSPICE
Report received from off going shift. Patient resting in bed with eyes closed. FLACC  Score 0/10 at this time.  Family at bedside without any requests or voiced c/o

## 2019-05-24 NOTE — [facility] IDG NURSE NOTES
Patient: Yasmany Knight    Date: 05/24/19  Time: 1:26 PM    1st IDG: Pt is a 41-year-old female with CHF for control of pain and agitation. Pt is responsive to pain. NPO. DTI on right inner heel, stage II to right shoulder blade. Marina placed on 5/23. Morphine 2 mg x 5, Haldol x 5, Ativan x 1, glycol x 1 in past 24 hours. Pts left arm is painful to touch. Plan: schedule Haldol 2 mg IV/SQ q 8 hours. Discontinue all PO meds and hold tray. Comprehensive plan of care reviewed. IDG and pt./family in agreement with plan of care. The IDG identifies through on-going assessment when a change is needed to the POC; the pt/family will receive care and services necessitated by changes in POC. Medications reviewed by the pharmacist and Medical Director.         Signed by: Lm Vega RN

## 2019-05-24 NOTE — [facility] IDG SOCIAL WORKER NOTES
Patient: Juliano Kirkland    Date: 05/24/19  Time: 10:42 AM    [facility]  Notes:    Progressing to her goals. LMSW will continue to provide emotional support. There is a lot of tension with in the family. Daughter has chosen [facility].           Signed by: Ashely Mercedes

## 2019-05-24 NOTE — [facility] IDG CHAPLAIN NOTES
Patient: Richie Hooker    Date: 05/24/19  Time: 12:08 PM    \[facility]\""  Notes    Assessment pending for spiritual care and bereavement.         Signed by: Marcelle Barrientos

## 2019-05-25 NOTE — PROGRESS NOTES
Dimitry Villasenor from [facility] here to transport  to  home.

## 2019-05-25 NOTE — PROGRESS NOTES
Received report on pt from off-going shift nurse. Pt resting quietly with sitter at bedside. Marina patent and intact Symptoms managed, no pain or respiratory distress. O2 at 2L via NC. Iv intact to right arm. Sister reports she will stay the night. No distress noted.

## 2022-06-03 NOTE — PROGRESS NOTES
Teri Tran  : 1928 [address] [facility] [address]  [address] [place] [address].  [phone]   [phone]        OUTPATIENT PHYSICAL THERAPY:Daily Note 2017      ICD-10: Treatment Diagnosis: Pain in right hip (M25.551)  Difficulty in walking, not elsewhere classified (R26.2)  Precautions/Allergies:   Celebrex [celecoxib] and Other medication   Fall Risk Score: 6 (? 5 = High Risk)  MD Orders: s/p Right IT Fracture: Strengthen Hip abductors and quad, gait training WBAT MEDICAL/REFERRING DIAGNOSIS:  Hip fracture, right ([facility] [address] [S72.001A]   DATE OF ONSET: surgery 17  REFERRING PHYSICIAN: Alexandru Khoury MD  RETURN PHYSICIAN APPOINTMENT: Aug 2017     INITIAL ASSESSMENT:  Ms. Kyle Barger presents with R hip pain and difficulty walking s/p ORIF for IT fracture. Pt has very poor balance and requires rolling walker for gait. Pt has decreased LE strength weaker with R hip and decreased ROM with R hip and knee. Pt will benefit from PT for strengthening, ROM, balance and gait training. Pt has increased difficulty with transfers and requires some assist with ADLs. Pt will benefit from beginning in aquatic environment for ease of movement and safety with challenging pt's balance and then will progress towards further land based strengthening, balance, and gait training. PROBLEM LIST (Impacting functional limitations):  1. Decreased Strength  2. Decreased ADL/Functional Activities  3. Decreased Transfer Abilities  4. Decreased Ambulation Ability/Technique  5. Decreased Balance  6. Increased Pain  7. Decreased Activity Tolerance  8. Decreased Flexibility/Joint Mobility  9. Decreased Keith with Home Exercise Program INTERVENTIONS PLANNED:  1. Balance Exercise  2. Cold  3. Gait Training  4. Heat  5. Home Exercise Program (HEP)  6. Manual Therapy  7. Neuromuscular Re-education/Strengthening  8. Range of Motion (ROM)  9.  Therapeutic Activites  10. Therapeutic Exercise/Strengthening  11. Transfer Training  12. aquatics   TREATMENT PLAN:  Effective Dates: 5/23/17 TO 8/15/17. Frequency/Duration: 2-3 times a week for 12 weeks  GOALS: (Goals have been discussed and agreed upon with patient.)  Short-Term Functional Goals: Time Frame: 4 weeks  1. Pt will independent with HEP. (in progress 6/27/2017)  2. Pt will increase R hip at least 10 ° in each plane for increased mobility with dressing. (MET 6/27/2017)  3. Pt will be able to participate in aquatic PT for at least 45 minutes without increased symptoms to improve mobility, gait, and strength. (MET 6/27/2017)  4. Pt will be able to stand and balance for at least 30 seconds without her walker. (in progress 6/27/2017)  5. Pt will be able to safely navigate up/down a curb with walker with SBA. (in progress 6/27/2017)  Discharge Goals: Time Frame: 12 weeks  1. Pt will be able to ambulate at least 600 ft with rolling walker safely. (in progress 6/27/2017)  2. Pt will be able to navigate up/down at least 4 stairs with railing safely. (in progress 6/23/2017)  3. Pt will be able to increase BLE strength at least 4/5 or greater for increased functional strength for squatting, gait, and stairs. (in progress 6/23/2017)  4. Pt will be able to  romberg stance for at least 30 seconds noting increased balance. (in progress 6/23/2017)  5. Pt will be able to reach overhead with both UEs while standing without UE support as if reaching into a cabinet without loss of balance. (in progress 6/23/2017)  Rehabilitation Potential For Stated Goals: Excellent                  HISTORY:   History of Present Injury/Illness (Reason for Referral):  Pt reports falling for unknown reason in her bathroom Sat Feb 18 and causing R hip IT fracture. Pt underwent ORIF Sun Feb 20, 2017. Pt was discharged to the [facility] around the 2/23/17 and went home around mid April.   Pt then had home health PT and was discharged to come to outpatient. Pt would like to be able to regain balance and strength to walk better. Pt states she is walking around her home but coming into PT today was the largest distance she has walked out of her home. Past Medical History/Comorbidities:   Ms. Lennox Flora  has a past medical history of Anemia of other chronic disease (4/26/2014); Anxiety; Arrhythmia; CAD (coronary artery disease) (20O7); Chronic systolic heart failure ([facility] [address] Coagulation disorder ([facility] [address] Congestive heart failure ([facility] [address] (08/2007); Contusion of chest wall (4/29/2012); Coronary artery disease involving autologous vein coronary bypass graft without angina pectoris (6/5/2015); Falls (4/23/2014); Generalized OA (6/5/2015); Heart failure ([facility] [address] Hip pain (4/26/2014); History of skin cancer (1992); [place] (hard of hearing); Hygroma (4/23/2014); Hyperglycemia (4/29/2012); Hyperlipidemia; Hypertension; Hypotension (4/23/2014); Mild dementia (12/3/2014); Nausea & vomiting; Neutrophilic leukocytosis (6/99/8953); Osteoarthritis; Osteoporosis; Osteoporosis (12/3/2014); Postmenopausal (12/3/2014); Postmenopausal bone loss; S/P cardiac pacemaker procedure (1/2011); S/P implantation of automatic cardioverter/defibrillator (AICD) (4/29/2012); S/P placement of cardiac pacemaker (4/29/2012); Shoulder fracture (1991); and Syncope (4/29/2012). Ms. Lennox Flora  has a past surgical history that includes biopsy of skin lesion (1992); bladder suspension (1995?); juan and bso (1984); tonsillectomy (as a child); heart catheterization (2007); shoulder arthroscopy (1991); pacemaker (1/13/2011); and ptca (08/2007).   Social History/Living Environment:    lives with daughter in one story home with ramp  Prior Level of Function/Work/Activity:  Independent with ADLs, Used walker intermittently prior to fall; had assist of daughter for cooking and cleaning  Current Medications:    Current Outpatient Prescriptions:     gabapentin (NEURONTIN) 100 mg capsule, TAKE ONE CAPSULE BY MOUTH AT BEDTIME, Disp: , Rfl: 0    nitroglycerin (NITROSTAT) 0.4 mg SL tablet, 1 Tab by SubLINGual route every five (5) minutes as needed. , Disp: 25 Tab, Rfl: 3    furosemide (LASIX) 20 mg tablet, One tab daily as needed, Disp: 90 Tab, Rfl: 2    carvedilol (COREG) 3.125 mg tablet, Take 1 Tab by mouth two (2) times daily (with meals). , Disp: 60 Tab, Rfl: 5    docusate sodium (COLACE) 100 mg capsule, Take 1 Cap by mouth two (2) times a day. Indications: prevent constipation, Disp: 60 Cap, Rfl: 0    atorvastatin (LIPITOR) 10 mg tablet, TAKE ONE TABLET BY MOUTH ONE TIME DAILY, Disp: 30 Tab, Rfl: 5    amiodarone (CORDARONE) 200 mg tablet, TAKE ONE-HALF TABLET BY MOUTH ONE TIME DAILY, Disp: 15 Tab, Rfl: 5    clopidogrel (PLAVIX) 75 mg tab, TAKE ONE TABLET BY MOUTH ONE TIME DAILY, Disp: 30 Tab, Rfl: 5    calcium-vitamin D (OYSTER SHELL) 500 mg(1,250mg) -200 unit per tablet, Take 1 Tab by mouth three (3) times daily (with meals). , Disp: 90 Tab, Rfl: 0    LORazepam (ATIVAN) 1 mg tablet, Take 1 Tab by mouth every twelve (12) hours as needed for Anxiety. Max Daily Amount: 2 mg., Disp: 14 Tab, Rfl: 0    diclofenac (VOLTAREN) 1 % gel, Apply  to affected area four (4) times daily. , Disp: 100 g, Rfl: 3    albuterol (PROVENTIL HFA, VENTOLIN HFA, PROAIR HFA) 90 mcg/actuation inhaler, Take 1 Puff by inhalation every four (4) hours as needed for Wheezing., Disp: 1 Inhaler, Rfl: 1    ERGOCALCIFEROL, VITAMIN D2, (VITAMIN D2 PO), Take  by mouth., Disp: , Rfl:     aspirin (ASPIRIN) 325 mg tablet, Take 81 mg by mouth every morning. Hold until after surgery, Disp: , Rfl:    Date Last Reviewed:  6/27/2017   EXAMINATION:   Observation/Orthostatic Postural Assessment:           Forward flexed posture with rounded shoulders and forward head posture  Palpation:          Minimal tenderness R lateral hip with only minor scar tissue noted over incision; incision healed well  ROM:       Date:  5/23/17 Date:  06/23/17 Date:     LE ROM Left Right       Hip Flexion 120 ° 100 ° 120 ° 110 ° [facility]     Hip Abduction 20 ° 10 ° 20 ° 20 °     Straight Leg Raise (SLR)  65 °                Knee Flexion 140 ° 130 ° [facility] WF     Knee Extension 0 ° 0 ° [facility]       Strength:     Date:  5/23/17 Date:  06/23/17 Date:     LE MMT Left Right Left Right Left Right   Hip Flex (L1/L2) 4/5 3-/5  3/5     Hip Abd (glut med) 4-/5 2+/5  3-/5     Hip Ext  Not tested       Quad    ( L3/4) 4/5 4-/5  4-/5     Hamstring 4/5 4-/5  4-/5     Anterior Tib (L4/L5) 3/5 3-/5  3/5     Gastroc (S1/2) 3-/5 2+/5  3/5       Special Tests: deferred  Neurological Screen:        Sensation: intact to light touch but reports intermittent numbness/tingling bilateral feet  Functional Mobility:         Gait/Ambulation:  Using rolling walker with SBA to mod I with step through gait-as fatigue begins to shuffle        Transfers:  modified independent with transfer sit to stand from standard chair        Bed Mobility:  Increased effort and time but independent   Balance:          Poor; unable to let go of the walker during standing   Body Structures Involved:  1. Nerves  2. Bones  3. Joints  4. Muscles  5. Ligaments Body Functions Affected:  1. Sensory/Pain  2. Neuromusculoskeletal  3. Movement Related Activities and Participation Affected:  1. General Tasks and Demands  2. Mobility  3. Self Care  4. Domestic Life  5. Community, Social and Civic Life   CLINICAL PRESENTATION:   CLINICAL DECISION MAKING:   Outcome Measure: Tool Used: Lower Extremity Functional Scale (LEFS)  Score:  Initial: 12/80  Most Recent: 15/80 (Date: 06/23/17 )   Interpretation of Score: 20 questions each scored on a 5 point scale with 0 representing \"extreme difficulty or unable to perform\" and 4 representing \"no difficulty\". The lower the score, the greater the functional disability. 80/80 represents no disability. Minimal detectable change is 9 points. Score 80 79-63 62-48 47-32 31-16 15-1 0   Modifier CH CI CJ CK CL CM CN     ?  Mobility - Walking and Moving Around:     - CURRENT STATUS: CM - 80%-99% impaired, limited or restricted    - GOAL STATUS: CK - 40%-59% impaired, limited or restricted    - D/C STATUS:  ---------------To be determined---------------    Medical Necessity:   · Patient is expected to demonstrate progress in strength, range of motion, balance and functional technique to decrease assistance required with gait and ADLs and improve safety during gait. Reason for Services/Other Comments:  · Patient continues to require skilled intervention due to having difficulty walking and requires further PT to advance exercises for balance, strengthening, and gait. TREATMENT:   (In addition to Assessment/Re-Assessment sessions the following treatments were rendered)    Therapeutic Exercise: (see flow sheet below for minutes) ):  Exercises per grid below to improve mobility and strength. Required moderate visual, verbal and tactile cues to promote proper body alignment. Aquatic Therapy (see flow sheet below for minutes): Aquatic treatment performed per flow grid for Decreased muscle strength, Decreased static/dynamic balance and reactive control, Decreased range of motion and Ease of movement. Cues provided for technique and posture. Assistance by therapist provided for balance. Patient has difficulty with balance. Date: 5/23/17 6/6/17 6/9/17 6/12/17 6/15/17 06/23/17 6/27/17   Modalities:  Defibrillator/Pacemaker                              Manual Therapy:                              Aquatic Exercise:   With CGA/SBA of therapist in water c pt  60 mins  1.5# 45 min 1.5# 55 min 1.5 # 55 min 1.5# 60 min 1.5#/40 minutes   Pt to use lift chair for in/out of pool          Gait F/B/S/M  With Gait Belt  Forward  CGA 20 mins total c rest breaks [id number]  X2L March  Side step 10ft to L   CGA/Ever c rest breaks X6L Forward 4ft CGA    X2L Forward 3 ft Min A X4 L forward 3.5ft  X2L forward 4.5ft  X1L March 4.5ft    Gait belt and CGA/Ever X2L F/B/S/M with aquatic walker x4 L each with aquatic walker   4-way hip  4x5 X10 B X10 B X15 B X15 BLE at edge of pool x20 BLE   PF/DF  x20 X15 X10 X15 x15 x20   Hamstring Curls     X10 B     Hip Flexion with knee ext. (lee)  LAQ sitting in lift chair LAQ sitting in lift chair X15 BLE  LAQ sitting in lift chair BLE  x10 BLE   Squats     X15 CGA X15 X15  x20   SLR march          Core:            Core:          Deep well bike          Deep well jumping jelena          Deep well scissors          Deep well:  traction          Core: Seated rows    X10 c open paddles X15 c open paddles X15 with hydro bells x15 open paddles   Core: Seated horizontal ad/Abd      X15 with hydro bells x15   Core: Seated abd/add    X10 c open paddles X15 c open paddles X15 with hydro bells x15   Seated hip abd/add    Yellow ball squeeze X10H5  Manual resistance abd X10H5  X15H3 manual resistance/ using add ball    Seated leg press    Manual resistance X10 B X15 manual resistance  same    Seated Marches       X15 B x15 B X20 BLE x20 BLE   Standing Marches     X15 B     Seated LAQ    X15 B  X20 BLE    Hamstring Stretch          Piriformis stretch          PF Stretch          Balance: standing without use of rail        3 x 30 seconds with mild perturbations of water   Balance: standing with UE reaching overhead        2x10             Therapist accompanied pt to car, c CGA during amb gait and SBA for getting in car    done        Therapeutic Exercise: 8 minutes         Supine hip abduction x15         bridging x15         Heel/toe raises x5                   F=forward  B=Backward  S=sidesteps  M=marches    H=hold    Manual: (see flow sheet above for minutes) ---  Modalities: (see flow sheet above for minutes) ---    HEP: discussed current home health PT HEP and will advance as appropriate in the future. Treatment/Session Assessment:  Pt arrived 10 minutes late with her daughter.  Pt continues to demonstrate poor gait mechanics despite verbal and visual cues. Pt also needed significant tactile, visual, and verbal cues with aquatics with PT CGA/SBA with aquatics. Pt did not seem to experience any c/o pain during aquatics. Focused on balance today, instructing pt to not use rail as practiced standing balance with UE movements. Pt demonstrating poor balance so will continue to focus on improving balance and core/LE strength. · Pain/ Symptoms: Initial:   2/10 R hip     Patient reports very minimal pain. She says she fell in her back bed room last Friday, but she is not hurt. Pt did not recall how she fell. Pt brought new order to continue PT for 2 months. Pt's daughter states that Dr. Quintin Ornelas says her hip was healed well. He said continue PT as long as we felt she needed. Post Session:  0/10     · Compliance with Program/Exercises: partial   · Recommendations/Intent for next treatment session: \"Next visit will continue aquatics\". Pt will require CGA/SBA from therapist in the pool, due to global weakness and decreased for balance; for safety of patient.       Total Treatment Duration:  PT Patient Time In/Time Out  Time In: 1340  Time Out: 1420     Nahomi Gallegos, PT Performed

## 2022-07-13 NOTE — PROGRESS NOTES
Robitussin 5ml given for cough. no headache/no difficulty walking/imbalance/no seizures/no change in level of consciousness

## 2022-07-24 NOTE — PROGRESS NOTES
Hospitalist Progress Note 2019 Admit Date: 2019  5:20 PM  
NAME: Yasmany Knight :  1928 MRN:  [id number] Attending: Vilma Iyer MD 
PCP:  Wilber Byrd MD 
 
SUBJECTIVE:  
Patient is a 79yo F with hx HFrEF 20%, AICD, mild dementia who presents with 4d worsening cough and shortness of breath, sent to ER from PCP office. Interval History (): patient examined at bedside. No documented acute overnight events. Daughter at bedside. Patient more somnolent today compared to yesterday, no obvious signs of distress. Ate a little bit yesterday, has not eaten anything at all yet today. Review of Systems unable to be obtained due to clinical condition. PHYSICAL EXAM  
 
Visit Vitals /74 Pulse 69 Temp 98.4 °F (36.9 °C) Resp 16 Ht 5' (1.524 m) Wt 48.6 kg (107 lb 3.2 oz) SpO2 96% Breastfeeding? No  
BMI 20.94 kg/m² Temp (24hrs), Av.2 °F (36.8 °C), Min:97.3 °F (36.3 °C), Max:98.7 °F (37.1 °C) Oxygen Therapy O2 Sat (%): 96 % (19 1131) Pulse via Oximetry: 72 beats per minute (19 0246) O2 Device: Nasal cannula (19 0246) O2 Flow Rate (L/min): 3 l/min (19 0246) O2 Temperature: 35.6 °F (2 °C) (193) FIO2 (%): 32 % (19 0246) Intake/Output Summary (Last 24 hours) at 2019 1410 Last data filed at 2019 3421 Gross per 24 hour Intake 2594 ml Output  Net 2594 ml General: Elderly, somnolent but not alert or oriented x3 Lungs:  CTA Bilaterally, productive cough Heart:  Regular rate and rhythm,  No murmur, rub, or gallop Abdomen: Soft, Non distended, Non tender, Positive bowel sounds Extremities: No edema appreciated Neurologic:  No focal deficits, not alert or oriented XR CHEST SNGL V Final Result XR CHEST PA LAT Final Result Impression: Right lung airspace opacities would raise suspicion for pneumonia in  
the proper clinical setting. ASSESSMENT Active Hospital Problems Diagnosis Date Noted  Pneumonia 05/13/2019  Mild dementia 12/03/2014  Chronic systolic heart failure ([facility] [address]  Hyperlipidemia  S/P implantation of automatic cardioverter/defibrillator (AICD) 04/29/2012 Plan: # Acute metabolic encephalopathy with adult failure to thrive 
- continue with honey-thickened diet, very high risk of aspiration - patient not advancing towards goals with PT currently - palliative/Hospice consults for goals of care discussion, will revisit based on clinical improvement early next week 
- scheduled Seroquel 25 mg po qHS 
- avoid sedative/hypnotics, anticholinergics/antihistamines 
- avoid restraints if possible # Acute hypoxic respiratory failure with productive cough, likely CAP vs acute decompensated systolic heart failure 
- repeat TTE showing systolic dysfunction 
- repeat CXR showing worsening volume overload, likely related to heart failure 
- restart Lasix at 20 mg IV twice daily, has had issues with blood pressure with higher doses 
- discontinued azithro/Rocephin 
- switch to Zosyn to cover aspiration pneumonia (day 3) - strict I's/O's - DuoNebs scheduled # History of Afib 
- continue with amiodarone # History of hypothyroidism 
- continue with levothyroxine 
  
F/E/N: no fluids, replete electrolytes as needed, cardiac diet Ppx: heparin SQ for VTE Code Status: DNR Disposition: pending workup as above. Daughter states that she is aware of patient's poor prognosis and wants to see if Maritza Christian turns the corner\" clinically first.  All questions answered. Signed By: Kelley Dunlap DO May 19, 2019 no

## 2022-08-29 NOTE — [facility] IDG VOLUNTEER NOTES
Patient: Ashish Tristan    Date: 05/24/19  Time: 11:36 AM    [facility] Volunteer Notes  Volunteer - Hospice Interdisciplinary Plan of Care Note     Status Codes I = Initiated C=Continued R=Revised RS = Resolved      I.  Volunteer         Volunteer Assignment Information: Volunteers will continue to offer visits for companionship, harp music, snacks to the room, and pet therapy.                 Signed by: Nerissa Bearden
cane

## 2022-09-23 NOTE — PROGRESS NOTES
This note will not be viewable in [address] Patient discharged to [facility] on 9/14/18. JEANNINE outreach postponed for 21 days due to discharge to non-preferred network SNF. no

## (undated) DEVICE — DRESSING,GAUZE,XEROFORM,CURAD,1"X8",ST: Brand: CURAD

## (undated) DEVICE — 1010 S-DRAPE TOWEL DRAPE 10/BX: Brand: STERI-DRAPE™

## (undated) DEVICE — SUTURE MCRYL SZ 2-0 L27IN ABSRB VLT CT-1 L36MM 1/2 CIR TAPR Y339H

## (undated) DEVICE — SLIM BODY SKIN STAPLER: Brand: APPOSE ULC

## (undated) DEVICE — SURGICAL PROCEDURE PACK BASIC ST FRANCIS

## (undated) DEVICE — BUTTON SWITCH PENCIL BLADE ELECTRODE, HOLSTER: Brand: EDGE

## (undated) DEVICE — REM POLYHESIVE ADULT PATIENT RETURN ELECTRODE: Brand: VALLEYLAB

## (undated) DEVICE — 2000CC GUARDIAN II: Brand: GUARDIAN

## (undated) DEVICE — 3M™ TEGADERM™ TRANSPARENT FILM DRESSING FRAME STYLE, 1628, 6 IN X 8 IN (15 CM X 20 CM), 10/CT 8CT/CASE: Brand: 3M™ TEGADERM™

## (undated) DEVICE — AMD ANTIMICROBIAL GAUZE SPONGES,12 PLY USP TYPE VII, 0.2% POLYHEXAMETHYLENE BIGUANIDE HCI (PHMB): Brand: CURITY

## (undated) DEVICE — SUTURE MCRYL SZ 0 L27IN ABSRB VLT CT-1 L36MM 1/2 CIR TAPR Y340H

## (undated) DEVICE — SOLUTION IV 1000ML 0.9% SOD CHL

## (undated) DEVICE — ROD RMR L950MM DIA2.5MM W/ EXTN BALL TIP

## (undated) DEVICE — INTENDED FOR TISSUE SEPARATION, AND OTHER PROCEDURES THAT REQUIRE A SHARP SURGICAL BLADE TO PUNCTURE OR CUT.: Brand: BARD-PARKER ® STAINLESS STEEL BLADES

## (undated) DEVICE — (D)DRAPE ISOL ANTIMC 129X100IN -- DISC BY MFR

## (undated) DEVICE — 3.2MM GUIDE WIRE 400MM

## (undated) DEVICE — (D)PREP SKN CHLRAPRP APPL 26ML -- CONVERT TO ITEM 371833

## (undated) DEVICE — X-LARGE COTTON GLOVE: Brand: DEROYAL